# Patient Record
Sex: MALE | Race: WHITE | NOT HISPANIC OR LATINO | Employment: FULL TIME | URBAN - METROPOLITAN AREA
[De-identification: names, ages, dates, MRNs, and addresses within clinical notes are randomized per-mention and may not be internally consistent; named-entity substitution may affect disease eponyms.]

---

## 2017-01-18 RX ORDER — TRAMADOL HYDROCHLORIDE 50 MG/1
TABLET ORAL
Qty: 60 TABLET | Refills: 5 | Status: SHIPPED | OUTPATIENT
Start: 2017-01-18 | End: 2017-05-16 | Stop reason: SDUPTHER

## 2017-02-07 ENCOUNTER — LAB VISIT (OUTPATIENT)
Dept: LAB | Facility: HOSPITAL | Age: 67
End: 2017-02-07
Attending: INTERNAL MEDICINE
Payer: MEDICARE

## 2017-02-07 DIAGNOSIS — E11.9 TYPE II OR UNSPECIFIED TYPE DIABETES MELLITUS WITHOUT MENTION OF COMPLICATION, NOT STATED AS UNCONTROLLED: ICD-10-CM

## 2017-02-07 LAB
ALBUMIN SERPL BCP-MCNC: 4 G/DL
ALP SERPL-CCNC: 98 U/L
ALT SERPL W/O P-5'-P-CCNC: 32 U/L
ANION GAP SERPL CALC-SCNC: 9 MMOL/L
AST SERPL-CCNC: 30 U/L
BILIRUB SERPL-MCNC: 0.8 MG/DL
BUN SERPL-MCNC: 21 MG/DL
CALCIUM SERPL-MCNC: 9.6 MG/DL
CHLORIDE SERPL-SCNC: 105 MMOL/L
CO2 SERPL-SCNC: 25 MMOL/L
CREAT SERPL-MCNC: 1.1 MG/DL
EST. GFR  (AFRICAN AMERICAN): >60 ML/MIN/1.73 M^2
EST. GFR  (NON AFRICAN AMERICAN): >60 ML/MIN/1.73 M^2
GLUCOSE SERPL-MCNC: 105 MG/DL
POTASSIUM SERPL-SCNC: 4.4 MMOL/L
PROT SERPL-MCNC: 7.4 G/DL
SODIUM SERPL-SCNC: 139 MMOL/L

## 2017-02-07 PROCEDURE — 36415 COLL VENOUS BLD VENIPUNCTURE: CPT

## 2017-02-07 PROCEDURE — 83036 HEMOGLOBIN GLYCOSYLATED A1C: CPT

## 2017-02-07 PROCEDURE — 80053 COMPREHEN METABOLIC PANEL: CPT

## 2017-02-08 LAB
ESTIMATED AVG GLUCOSE: 154 MG/DL
HBA1C MFR BLD HPLC: 7 %

## 2017-02-13 ENCOUNTER — OFFICE VISIT (OUTPATIENT)
Dept: INTERNAL MEDICINE | Facility: CLINIC | Age: 67
End: 2017-02-13
Payer: MEDICARE

## 2017-02-13 VITALS
DIASTOLIC BLOOD PRESSURE: 86 MMHG | BODY MASS INDEX: 30.16 KG/M2 | WEIGHT: 222.69 LBS | SYSTOLIC BLOOD PRESSURE: 156 MMHG | HEIGHT: 72 IN | HEART RATE: 72 BPM

## 2017-02-13 DIAGNOSIS — E11.9 TYPE II OR UNSPECIFIED TYPE DIABETES MELLITUS WITHOUT MENTION OF COMPLICATION, NOT STATED AS UNCONTROLLED: ICD-10-CM

## 2017-02-13 DIAGNOSIS — M10.9 GOUT, UNSPECIFIED CAUSE, UNSPECIFIED CHRONICITY, UNSPECIFIED SITE: ICD-10-CM

## 2017-02-13 DIAGNOSIS — E11.9 TYPE 2 DIABETES MELLITUS WITHOUT COMPLICATION, WITHOUT LONG-TERM CURRENT USE OF INSULIN: Primary | ICD-10-CM

## 2017-02-13 DIAGNOSIS — G62.9 PERIPHERAL POLYNEUROPATHY: ICD-10-CM

## 2017-02-13 DIAGNOSIS — F32.A DEPRESSION, UNSPECIFIED DEPRESSION TYPE: ICD-10-CM

## 2017-02-13 DIAGNOSIS — E78.5 HYPERLIPIDEMIA, UNSPECIFIED HYPERLIPIDEMIA TYPE: ICD-10-CM

## 2017-02-13 DIAGNOSIS — F41.9 ANXIETY: ICD-10-CM

## 2017-02-13 DIAGNOSIS — K21.9 GASTROESOPHAGEAL REFLUX DISEASE, ESOPHAGITIS PRESENCE NOT SPECIFIED: ICD-10-CM

## 2017-02-13 PROCEDURE — 99214 OFFICE O/P EST MOD 30 MIN: CPT | Mod: S$PBB,,, | Performed by: INTERNAL MEDICINE

## 2017-02-13 PROCEDURE — 99999 PR PBB SHADOW E&M-EST. PATIENT-LVL III: CPT | Mod: PBBFAC,,, | Performed by: INTERNAL MEDICINE

## 2017-02-13 PROCEDURE — 99213 OFFICE O/P EST LOW 20 MIN: CPT | Mod: PBBFAC | Performed by: INTERNAL MEDICINE

## 2017-02-13 RX ORDER — AMITRIPTYLINE HYDROCHLORIDE 25 MG/1
25 TABLET, FILM COATED ORAL NIGHTLY PRN
Qty: 90 TABLET | Refills: 1 | Status: SHIPPED | OUTPATIENT
Start: 2017-02-13 | End: 2017-10-27

## 2017-02-13 NOTE — MR AVS SNAPSHOT
[facility]  [address]  [facility] [address]  Phone: [phone]  Fax: [phone]                  Ellis Hatch   2017 9:40 AM   Office Visit    Description:  Male : 1950   Provider:  Eddie Delaney Jr., MD   Department:  [facility] - Internal Medicine           Reason for Visit     Follow-up           Diagnoses this Visit        Comments    Type 2 diabetes mellitus without complication, without long-term current use of insulin    -  Primary     Gout, unspecified cause, unspecified chronicity, unspecified site         Hyperlipidemia, unspecified hyperlipidemia type         Gastroesophageal reflux disease, esophagitis presence not specified         Anxiety         Depression, unspecified depression type         Type II or unspecified type diabetes mellitus without mention of complication, not stated as uncontrolled         Peripheral polyneuropathy                To Do List           Goals (5 Years of Data)     None       These Medications        Disp Refills Start End    amitriptyline (ELAVIL) 25 MG tablet 90 tablet 1 2017     Take 1 tablet (25 mg total) by mouth nightly as needed for Insomnia. - Oral    Pharmacy: [facility] [address] #: [phone]         [facility] On Call     [facility] On Call Nurse Care Line -  Assistance  Registered nurses in the [facility] provide clinical advisement, health education, appointment booking, and other advisory services.  Call for this free service at [phone].             Medications           Message regarding Medications     Verify the changes and/or additions to your medication regime listed below are the same as discussed with your clinician today.  If any of these changes or additions are incorrect, please notify your healthcare provider.        START taking these NEW medications        Refills    amitriptyline (ELAVIL) 25 MG tablet 1    Sig: Take 1 tablet  (25 mg total) by mouth nightly as needed for Insomnia.    Class: Normal    Route: Oral           Verify that the below list of medications is an accurate representation of the medications you are currently taking.  If none reported, the list may be blank. If incorrect, please contact your healthcare provider. Carry this list with you in case of emergency.           Current Medications     allopurinol (ZYLOPRIM) 100 MG tablet Take 1 tablet (100 mg total) by mouth once daily.    alprazolam (XANAX) 0.5 MG tablet TAKE 1/2 TABLET BY MOUTH AS NEEDED NIGHTLY AT BEDTIME    amitriptyline (ELAVIL) 25 MG tablet Take 1 tablet (25 mg total) by mouth nightly as needed for Insomnia.    atorvastatin (LIPITOR) 40 MG tablet Take 1 tablet (40 mg total) by mouth once daily.    blood sugar diagnostic Strp Use to check glucose daily    blood-glucose meter kit Use as instructed    duloxetine (CYMBALTA) 60 MG capsule Take 1 capsule (60 mg total) by mouth once daily.    fish oil-omega-3 fatty acids 300-1,000 mg capsule Take 2 g by mouth once daily.    FOLIC ACID/MULTIVITS-MIN/LUT (CENTRUM SILVER ORAL) Take 1 tablet by mouth once daily.    glimepiride (AMARYL) 4 MG tablet TAKE 1 TABLET TWICE DAILY    glucosamine-condroitin-hrb#182 (COSAMIN ASU) 375-200-100 mg Cap Take 4 tablets by mouth once daily.    lancets Misc Use to check glucose daily    magnesium citrate Powd 325 mg by Misc.(Non-Drug; Combo Route) route every other day.    metformin (GLUCOPHAGE) 500 MG tablet Take 2 tablets (1,000 mg total) by mouth 2 (two) times daily with meals.    pantoprazole (PROTONIX) 40 MG tablet Take 1 tablet (40 mg total) by mouth once daily.    sitagliptin (JANUVIA) 100 MG Tab Take 1 tablet (100 mg total) by mouth once daily.    tadalafil (CIALIS) 10 MG tablet Take 1 tablet (10 mg total) by mouth daily as needed for Erectile Dysfunction.    tamsulosin (FLOMAX) 0.4 mg Cp24 TAKE 1 CAPSULE ONCE DAILY.    tramadol (ULTRAM) 50 mg tablet TAKE 1 TABLET EVERY 6  HOURS AS NEEDED FOR PAIN           Clinical Reference Information           Your Vitals Were     BP Pulse Height Weight BMI    156/86 (BP Location: Right arm, Patient Position: Sitting, BP Method: Automatic) 72 6' (1.829 m) 101 kg (222 lb 10.6 oz) 30.2 kg/m2      Blood Pressure          Most Recent Value    BP  (!)  156/86      Allergies as of 2/13/2017     Aspirin    Nsaids (Non-steroidal Anti-inflammatory Drug)    Phenergan [Promethazine]    Doxycycline      Immunizations Administered on Date of Encounter - 2/13/2017     None      Orders Placed During Today's Visit     Future Labs/Procedures Expected by Expires    Comprehensive metabolic panel  2/13/2017 4/14/2018    Hemoglobin A1c  2/13/2017 4/14/2018    Lipid panel  2/13/2017 4/14/2018      [facility] Sign-Up     Activating your [facility] account is as easy as 1-2-3!     1) Visit [email or web address], select Sign Up Now, enter this activation code and your date of birth, then select Next.  [email or web address]  Expires: 3/30/2017 10:55 AM      2) Create a username and password to use when you visit [facility] in the future and select a security question in case you lose your password and select Next.    3) Enter your e-mail address and click Sign Up!    Additional Information  If you have questions, please e-mail [email or web address] or call [phone] to talk to our [facility] staff. Remember, [facility] is NOT to be used for urgent needs. For medical emergencies, dial 911.         Language Assistance Services     ATTENTION: Language assistance services are available, free of charge. Please call [phone].      ATENCIÓN: Si habla español, tiene a youngblood disposición servicios gratuitos de asistencia lingüística. Llame al [phone].     CHÚ Ý: N?u b?n nói Ti?ng Vi?t, có các d?ch v? h? tr? ngôn ng? mi?n phí dành cho b?n. G?i s? [phone].         [facility] complies with applicable Federal civil rights laws and does not discriminate on the basis  of race, color, national origin, age, disability, or sex.

## 2017-02-13 NOTE — PROGRESS NOTES
HISTORY OF PRESENT ILLNESS: Mr. Hatch is a 66-year-old gentleman coming in    today for followup of multiple medical problems today.          1. This is a 65-year-old gentleman with diabetes mellitus type 2. He has had DM for 20 years.  He is on    Amaryl 4 mg in the morning, metformin, and Januvia. . His hemoglobin A1c 7.0  and recent glucose is 1105 . He has been moving around more. He is able to work, but not exercising very much. No low Glucoses at home. He is having some nocturnal burning in feet.            2. He has chronic kidney disease, status post acute kidney injury back in    December 2013. Creatinine had gone up to 2.0 to 2.3. He had renal stones and    had lithotripsy last year. Most recent creatinine is 1.1, putting him at    stage II chronic kidney disease. He is finialy off NSAIDS.           3. He has hyperlipidemia, which he has had for several years. He is on    Lipitor. His recent cholesterol is 99, HDL 42, LDL is 37.7, and triglycerides    are 98.       4. He has OA of both knees, s/p right  knee replacement. Left knee still bothers him.  Gong to try to see Ortho in [place].    5. Torn rotator cups- He is going to see MD in [facility] for his shoulder surgery.      6. ENZO- using CPAP every night and is doing well.               ROS : Gen - no fatigue or significant weight change  Eyes - no eye pain or visual changes  ENT - no hoarseness or sore throat  CV - No chest pain or SOB. NO palpitations.  Pulm - no cough or wheezing  GI - no N/V/D   no dysuria or incontinence  MS - no joint pain or muscle pain  Skin - no rash, or c/o of skin lesions  Neuro - no HA, dizziness--- memory is doing well.   Heme - no abnormal bleeding or bruising  Endo - no polydipsia, or temperature changes  Psych - no anxiety or depression          PHYSICAL EXAMINATION:  GENERAL: He is a well-appearing 66-year-old gentleman in no acute distress.  HEENT: Ear canals are open. TMs are clear. Oropharynx is clear.  NECK:  Supple. He has no JVD. Thyroid is not enlarged.  CARDIOVASCULAR: S1 and S2, regular rate and rhythm without murmur, gallop, or    Rub.  LUNGS: Clear bilaterally  ABDOMEN: Soft, nontender, no hepatosplenomegaly, no guarding or rebound    tenderness.  LOWER EXTREMITIES: No edema. Missing the middle finger on his left hand.    Feet- Protective Sensation (w/ 10 gram monofilament):  Right: Decreased  Left: Decreased    Visual Inspection:  Normal -  Bilateral    Pedal Pulses:   Right: Present  Left: Present    Posterior tibialis:   Right:Present  Left: Present          ASSESSMENT:  1. Diabetes mellitus type 2: Januvia, Amaryl 4mg bid, metformin 1000mg Bid. We discussed appropriate diet and exercise. Discussed hypoglycemia. We discussed invokania. He is drinking a lot of sweet tea and not exercising. He wishes to try lifestyle modification first.       2. Anxiety-- He is cutting  down to using 1/2 xanax every night.    stay on cymbalta. .  3. He has obstructive sleep apnea for which he uses CPAP. He was using it every night- Will set up CPAP supplies.    4. ED-- he has tried Viagra- cialias.    5. Back pain- seeing Chiropractor for this.   6. Peripheral neuropathy.  - will start some elavil     Will follow up in 3 months with labs.    Sees outside urologist in [place] and has family history of prostate cancer.

## 2017-05-18 RX ORDER — TRAMADOL HYDROCHLORIDE 50 MG/1
TABLET ORAL
Qty: 60 TABLET | Refills: 5 | Status: SHIPPED | OUTPATIENT
Start: 2017-05-18 | End: 2017-09-20 | Stop reason: SDUPTHER

## 2017-05-22 RX ORDER — ALPRAZOLAM 0.5 MG/1
TABLET ORAL
Qty: 15 TABLET | Refills: 3 | Status: SHIPPED | OUTPATIENT
Start: 2017-05-22 | End: 2017-09-11 | Stop reason: SDUPTHER

## 2017-08-04 DIAGNOSIS — E11.9 CONTROLLED TYPE 2 DIABETES MELLITUS WITHOUT COMPLICATION, UNSPECIFIED LONG TERM INSULIN USE STATUS: Primary | ICD-10-CM

## 2017-08-04 NOTE — PROGRESS NOTES
Per Health Maintenance/ pre-visit chart review, pt due for dm eye exam. Order placed per written order guideline.

## 2017-08-08 ENCOUNTER — LAB VISIT (OUTPATIENT)
Dept: LAB | Facility: HOSPITAL | Age: 67
End: 2017-08-08
Attending: INTERNAL MEDICINE
Payer: MEDICARE

## 2017-08-08 DIAGNOSIS — E78.5 HYPERLIPIDEMIA, UNSPECIFIED HYPERLIPIDEMIA TYPE: ICD-10-CM

## 2017-08-08 DIAGNOSIS — E11.9 TYPE 2 DIABETES MELLITUS WITHOUT COMPLICATION, WITHOUT LONG-TERM CURRENT USE OF INSULIN: ICD-10-CM

## 2017-08-08 LAB
ALBUMIN SERPL BCP-MCNC: 3.6 G/DL
ALP SERPL-CCNC: 107 U/L
ALT SERPL W/O P-5'-P-CCNC: 32 U/L
ANION GAP SERPL CALC-SCNC: 9 MMOL/L
AST SERPL-CCNC: 27 U/L
BILIRUB SERPL-MCNC: 0.7 MG/DL
BUN SERPL-MCNC: 21 MG/DL
CALCIUM SERPL-MCNC: 9.7 MG/DL
CHLORIDE SERPL-SCNC: 107 MMOL/L
CHOLEST/HDLC SERPL: 2.7 {RATIO}
CO2 SERPL-SCNC: 24 MMOL/L
CREAT SERPL-MCNC: 1 MG/DL
EST. GFR  (AFRICAN AMERICAN): >60 ML/MIN/1.73 M^2
EST. GFR  (NON AFRICAN AMERICAN): >60 ML/MIN/1.73 M^2
GLUCOSE SERPL-MCNC: 119 MG/DL
HDL/CHOLESTEROL RATIO: 37.1 %
HDLC SERPL-MCNC: 105 MG/DL
HDLC SERPL-MCNC: 39 MG/DL
LDLC SERPL CALC-MCNC: 45.6 MG/DL
NONHDLC SERPL-MCNC: 66 MG/DL
POTASSIUM SERPL-SCNC: 4.4 MMOL/L
PROT SERPL-MCNC: 7.5 G/DL
SODIUM SERPL-SCNC: 140 MMOL/L
TRIGL SERPL-MCNC: 102 MG/DL

## 2017-08-08 PROCEDURE — 80053 COMPREHEN METABOLIC PANEL: CPT

## 2017-08-08 PROCEDURE — 80061 LIPID PANEL: CPT

## 2017-08-08 PROCEDURE — 83036 HEMOGLOBIN GLYCOSYLATED A1C: CPT

## 2017-08-08 PROCEDURE — 36415 COLL VENOUS BLD VENIPUNCTURE: CPT

## 2017-08-09 LAB
ESTIMATED AVG GLUCOSE: 157 MG/DL
HBA1C MFR BLD HPLC: 7.1 %

## 2017-08-15 ENCOUNTER — OFFICE VISIT (OUTPATIENT)
Dept: INTERNAL MEDICINE | Facility: CLINIC | Age: 67
End: 2017-08-15
Payer: MEDICARE

## 2017-08-15 VITALS
HEART RATE: 73 BPM | DIASTOLIC BLOOD PRESSURE: 78 MMHG | SYSTOLIC BLOOD PRESSURE: 146 MMHG | WEIGHT: 220 LBS | HEIGHT: 72 IN | BODY MASS INDEX: 29.8 KG/M2 | OXYGEN SATURATION: 98 %

## 2017-08-15 DIAGNOSIS — F32.A DEPRESSION, UNSPECIFIED DEPRESSION TYPE: ICD-10-CM

## 2017-08-15 DIAGNOSIS — N40.0 BENIGN PROSTATIC HYPERPLASIA, PRESENCE OF LOWER URINARY TRACT SYMPTOMS UNSPECIFIED: ICD-10-CM

## 2017-08-15 DIAGNOSIS — E11.9 TYPE 2 DIABETES MELLITUS WITHOUT COMPLICATION, WITHOUT LONG-TERM CURRENT USE OF INSULIN: ICD-10-CM

## 2017-08-15 DIAGNOSIS — E78.5 HYPERLIPIDEMIA, UNSPECIFIED HYPERLIPIDEMIA TYPE: Primary | ICD-10-CM

## 2017-08-15 DIAGNOSIS — F41.9 ANXIETY: ICD-10-CM

## 2017-08-15 LAB
CREAT UR-MCNC: 137 MG/DL
MICROALBUMIN UR DL<=1MG/L-MCNC: 706 UG/ML
MICROALBUMIN/CREATININE RATIO: 515.3 UG/MG

## 2017-08-15 PROCEDURE — 99999 PR PBB SHADOW E&M-EST. PATIENT-LVL IV: CPT | Mod: PBBFAC,,, | Performed by: INTERNAL MEDICINE

## 2017-08-15 PROCEDURE — 3078F DIAST BP <80 MM HG: CPT | Mod: ,,, | Performed by: INTERNAL MEDICINE

## 2017-08-15 PROCEDURE — 3045F PR MOST RECENT HEMOGLOBIN A1C LEVEL 7.0-9.0%: CPT | Mod: ,,, | Performed by: INTERNAL MEDICINE

## 2017-08-15 PROCEDURE — 99214 OFFICE O/P EST MOD 30 MIN: CPT | Mod: PBBFAC | Performed by: INTERNAL MEDICINE

## 2017-08-15 PROCEDURE — 82570 ASSAY OF URINE CREATININE: CPT

## 2017-08-15 PROCEDURE — 99214 OFFICE O/P EST MOD 30 MIN: CPT | Mod: S$PBB,,, | Performed by: INTERNAL MEDICINE

## 2017-08-15 PROCEDURE — 3077F SYST BP >= 140 MM HG: CPT | Mod: ,,, | Performed by: INTERNAL MEDICINE

## 2017-08-15 PROCEDURE — 3008F BODY MASS INDEX DOCD: CPT | Mod: ,,, | Performed by: INTERNAL MEDICINE

## 2017-08-15 NOTE — PROGRESS NOTES
HISTORY OF PRESENT ILLNESS: Mr. Hatch is a 67-year-old gentleman coming in    today for followup of multiple medical problems today.          1. This is a 65-year-old gentleman with diabetes mellitus type 2. He has had DM for 20 years.  He is on    Amaryl 4 mg in the morning, metformin, and Januvia. . His hemoglobin A1c 7.1  and recent glucose is 119 . He has been moving around more. He is able to work, but not exercising very much. No low Glucoses at home. He is having some nocturnal burning in feet.            2. He has chronic kidney disease, status post acute kidney injury back in    December 2013. Creatinine had gone up to 2.0 to 2.3. He had renal stones and    had lithotripsy last year. Most recent creatinine is 1.0, putting him at    stage II chronic kidney disease. He is finialy off NSAIDS.           3. He has hyperlipidemia, which he has had for several years. He is on    Lipitor. His recent cholesterol is 105, HDL 39, LDL is 45.6, and triglycerides    are 98.       4. He has OA of both knees, s/p right  knee replacement. Left knee still bothers him. He is seeing ortho later today to look at the other knee.   Gong to try to see Ortho in [place].    5. Torn rotator cups- He is going to see MD in [facility] for his shoulder surgery.      6. ENZO- using CPAP every night and is doing well.               ROS : Gen - no fatigue or significant weight change  Eyes - no eye pain or visual changes  ENT - no hoarseness or sore throat  CV - No chest pain or SOB. NO palpitations.  Pulm - no cough or wheezing  GI - no N/V/D   no dysuria or incontinence  MS - no joint pain or muscle pain  Skin - no rash, or c/o of skin lesions  Neuro - no HA, dizziness--- memory is doing well.   Heme - no abnormal bleeding or bruising  Endo - no polydipsia, or temperature changes  Psych - no anxiety or depression          PHYSICAL EXAMINATION:  GENERAL: He is a well-appearing 67-year-old gentleman in no acute distress.  HEENT: Ear  canals are open. TMs are clear. Oropharynx is clear.  NECK: Supple. He has no JVD. Thyroid is not enlarged.  CARDIOVASCULAR: S1 and S2, regular rate and rhythm without murmur, gallop, or    Rub.  LUNGS: Clear bilaterally  ABDOMEN: Soft, nontender, no hepatosplenomegaly, no guarding or rebound    tenderness.  LOWER EXTREMITIES: No edema. Missing the middle finger on his left hand.              ASSESSMENT:  1. Diabetes mellitus type 2: Januvia, Amaryl 4mg bid, metformin 1000mg Bid. We discussed appropriate diet and exercise. Discussed hypoglycemia. We discussed invokania. He is drinking a lot of sweet tea and not exercising. He wishes to try lifestyle modification first.       2. Anxiety-- He is cutting  down to using 1/2 xanax every night.    stay on cymbalta. .  3. He has obstructive sleep apnea for which he uses CPAP. He was using it every night- he wake up well refreshed.    4. ED-- he has tried Viagra- cialias.    5. Back pain- seeing Chiropractor for this.   6. Peripheral neuropathy.  - doing better on  elavil   - but makes him tired-- feet doing ok.    Will follow up in 6months with labs.    Sees outside urologist in [place] and has family history of prostate cancer. (next month)

## 2017-08-28 RX ORDER — ALLOPURINOL 100 MG/1
TABLET ORAL
Qty: 30 TABLET | Refills: 12 | Status: SHIPPED | OUTPATIENT
Start: 2017-08-28 | End: 2018-06-27 | Stop reason: SDUPTHER

## 2017-09-06 RX ORDER — TAMSULOSIN HYDROCHLORIDE 0.4 MG/1
CAPSULE ORAL
Qty: 30 CAPSULE | Refills: 12 | Status: SHIPPED | OUTPATIENT
Start: 2017-09-06 | End: 2018-06-27 | Stop reason: SDUPTHER

## 2017-09-11 RX ORDER — METFORMIN HYDROCHLORIDE 500 MG/1
TABLET ORAL
Qty: 360 TABLET | Refills: 3 | Status: SHIPPED | OUTPATIENT
Start: 2017-09-11 | End: 2018-06-27 | Stop reason: SDUPTHER

## 2017-09-11 RX ORDER — ALPRAZOLAM 0.5 MG/1
TABLET ORAL
Qty: 15 TABLET | Refills: 3 | Status: SHIPPED | OUTPATIENT
Start: 2017-09-11 | End: 2017-12-06 | Stop reason: SDUPTHER

## 2017-09-16 RX ORDER — PANTOPRAZOLE SODIUM 40 MG/1
TABLET, DELAYED RELEASE ORAL
Qty: 90 TABLET | Refills: 3 | Status: SHIPPED | OUTPATIENT
Start: 2017-09-16 | End: 2018-06-27 | Stop reason: SDUPTHER

## 2017-09-19 RX ORDER — SITAGLIPTIN 100 MG/1
TABLET, FILM COATED ORAL
Qty: 90 TABLET | Refills: 3 | Status: SHIPPED | OUTPATIENT
Start: 2017-09-19 | End: 2018-04-12 | Stop reason: SDUPTHER

## 2017-09-19 RX ORDER — DULOXETIN HYDROCHLORIDE 60 MG/1
CAPSULE, DELAYED RELEASE ORAL
Qty: 30 CAPSULE | Refills: 11 | Status: SHIPPED | OUTPATIENT
Start: 2017-09-19 | End: 2018-06-27 | Stop reason: SDUPTHER

## 2017-09-21 RX ORDER — TRAMADOL HYDROCHLORIDE 50 MG/1
TABLET ORAL
Qty: 60 TABLET | Refills: 5 | Status: SHIPPED | OUTPATIENT
Start: 2017-09-21 | End: 2018-03-19 | Stop reason: SDUPTHER

## 2017-10-06 RX ORDER — GLIMEPIRIDE 4 MG/1
TABLET ORAL
Qty: 60 TABLET | Refills: 9 | Status: SHIPPED | OUTPATIENT
Start: 2017-10-06 | End: 2018-04-12 | Stop reason: SDUPTHER

## 2017-10-23 RX ORDER — ATORVASTATIN CALCIUM 40 MG/1
TABLET, FILM COATED ORAL
Qty: 30 TABLET | Refills: 11 | Status: SHIPPED | OUTPATIENT
Start: 2017-10-23 | End: 2018-06-27 | Stop reason: SDUPTHER

## 2017-10-27 ENCOUNTER — TELEPHONE (OUTPATIENT)
Dept: INTERNAL MEDICINE | Facility: CLINIC | Age: 67
End: 2017-10-27

## 2017-10-27 NOTE — TELEPHONE ENCOUNTER
Please call -- he can not take his amitriptyline anymore because of potential interactions with his tramadol and alprazolam--please ask him to stop the amitriptyline

## 2017-10-27 NOTE — TELEPHONE ENCOUNTER
Spoke to pt wife and informed her to have pt stop taking amitriptyline because of the potential interactions with his tramadol and alprazolam

## 2017-11-27 ENCOUNTER — TELEPHONE (OUTPATIENT)
Dept: INTERNAL MEDICINE | Facility: CLINIC | Age: 67
End: 2017-11-27

## 2017-11-27 NOTE — TELEPHONE ENCOUNTER
----- Message from Danni Zapien sent at 2017 10:42 AM CST -----  Contact: pt [phone]  Pt would like a call from the nurse in regards to getting an appointment before 2018 because the pt insurance will  the next available is 2018,please advise pt

## 2017-12-07 RX ORDER — ALPRAZOLAM 0.5 MG/1
TABLET ORAL
Qty: 15 TABLET | Refills: 3 | Status: SHIPPED | OUTPATIENT
Start: 2017-12-07 | End: 2018-05-16 | Stop reason: SDUPTHER

## 2017-12-12 ENCOUNTER — LAB VISIT (OUTPATIENT)
Dept: LAB | Facility: HOSPITAL | Age: 67
End: 2017-12-12
Attending: INTERNAL MEDICINE
Payer: COMMERCIAL

## 2017-12-12 DIAGNOSIS — E78.5 HYPERLIPIDEMIA, UNSPECIFIED HYPERLIPIDEMIA TYPE: ICD-10-CM

## 2017-12-12 DIAGNOSIS — E11.9 TYPE 2 DIABETES MELLITUS WITHOUT COMPLICATION, WITHOUT LONG-TERM CURRENT USE OF INSULIN: ICD-10-CM

## 2017-12-12 LAB
ALBUMIN SERPL BCP-MCNC: 3.8 G/DL
ALP SERPL-CCNC: 114 U/L
ALT SERPL W/O P-5'-P-CCNC: 21 U/L
ANION GAP SERPL CALC-SCNC: 11 MMOL/L
AST SERPL-CCNC: 23 U/L
BILIRUB SERPL-MCNC: 0.7 MG/DL
BUN SERPL-MCNC: 19 MG/DL
CALCIUM SERPL-MCNC: 10.1 MG/DL
CHLORIDE SERPL-SCNC: 104 MMOL/L
CHOLEST SERPL-MCNC: 117 MG/DL
CHOLEST/HDLC SERPL: 2.9 {RATIO}
CO2 SERPL-SCNC: 25 MMOL/L
CREAT SERPL-MCNC: 1 MG/DL
EST. GFR  (AFRICAN AMERICAN): >60 ML/MIN/1.73 M^2
EST. GFR  (NON AFRICAN AMERICAN): >60 ML/MIN/1.73 M^2
ESTIMATED AVG GLUCOSE: 148 MG/DL
GLUCOSE SERPL-MCNC: 105 MG/DL
HBA1C MFR BLD HPLC: 6.8 %
HDLC SERPL-MCNC: 40 MG/DL
HDLC SERPL: 34.2 %
LDLC SERPL CALC-MCNC: 50.6 MG/DL
NONHDLC SERPL-MCNC: 77 MG/DL
POTASSIUM SERPL-SCNC: 4.2 MMOL/L
PROT SERPL-MCNC: 8 G/DL
SODIUM SERPL-SCNC: 140 MMOL/L
TRIGL SERPL-MCNC: 132 MG/DL

## 2017-12-12 PROCEDURE — 36415 COLL VENOUS BLD VENIPUNCTURE: CPT

## 2017-12-12 PROCEDURE — 80053 COMPREHEN METABOLIC PANEL: CPT

## 2017-12-12 PROCEDURE — 83036 HEMOGLOBIN GLYCOSYLATED A1C: CPT

## 2017-12-12 PROCEDURE — 80061 LIPID PANEL: CPT

## 2017-12-20 ENCOUNTER — OFFICE VISIT (OUTPATIENT)
Dept: INTERNAL MEDICINE | Facility: CLINIC | Age: 67
End: 2017-12-20
Payer: MEDICARE

## 2017-12-20 VITALS
WEIGHT: 214.31 LBS | BODY MASS INDEX: 29.03 KG/M2 | HEART RATE: 96 BPM | DIASTOLIC BLOOD PRESSURE: 70 MMHG | HEIGHT: 72 IN | SYSTOLIC BLOOD PRESSURE: 130 MMHG

## 2017-12-20 DIAGNOSIS — F41.9 ANXIETY: Primary | ICD-10-CM

## 2017-12-20 DIAGNOSIS — Z96.653 STATUS POST TOTAL BILATERAL KNEE REPLACEMENT: ICD-10-CM

## 2017-12-20 DIAGNOSIS — E78.5 HYPERLIPIDEMIA, UNSPECIFIED HYPERLIPIDEMIA TYPE: ICD-10-CM

## 2017-12-20 DIAGNOSIS — E11.9 TYPE 2 DIABETES MELLITUS WITHOUT COMPLICATION, WITHOUT LONG-TERM CURRENT USE OF INSULIN: ICD-10-CM

## 2017-12-20 DIAGNOSIS — F11.90 OPIOID USE, UNSPECIFIED, UNCOMPLICATED: ICD-10-CM

## 2017-12-20 DIAGNOSIS — G62.9 PERIPHERAL POLYNEUROPATHY: ICD-10-CM

## 2017-12-20 DIAGNOSIS — M25.561 RIGHT KNEE PAIN, UNSPECIFIED CHRONICITY: ICD-10-CM

## 2017-12-20 PROCEDURE — 80307 DRUG TEST PRSMV CHEM ANLYZR: CPT

## 2017-12-20 PROCEDURE — 99999 PR PBB SHADOW E&M-EST. PATIENT-LVL III: CPT | Mod: PBBFAC,,, | Performed by: INTERNAL MEDICINE

## 2017-12-20 PROCEDURE — 99213 OFFICE O/P EST LOW 20 MIN: CPT | Mod: PBBFAC | Performed by: INTERNAL MEDICINE

## 2017-12-20 PROCEDURE — 99214 OFFICE O/P EST MOD 30 MIN: CPT | Mod: S$PBB,,, | Performed by: INTERNAL MEDICINE

## 2017-12-20 RX ORDER — GABAPENTIN 300 MG/1
300 CAPSULE ORAL NIGHTLY
Qty: 90 CAPSULE | Refills: 11 | Status: SHIPPED | OUTPATIENT
Start: 2017-12-20 | End: 2018-06-27 | Stop reason: SDUPTHER

## 2017-12-20 NOTE — PROGRESS NOTES
Progress Notes        HISTORY OF PRESENT ILLNESS: Mr. Hatch is a 67-year-old gentleman coming in    today for followup of multiple medical problems today.          1. This is a 67-year-old gentleman with diabetes mellitus type 2. He has had DM for 20 years.  He is on   Amaryl 4 mg in the morning, metformin, and Januvia. . His hemoglobin A1c 6.8  and recent glucose is 1105 . He has been moving around more. He is able to work, but not exercising very much. No low Glucoses at home. He is having some nocturnal burning in feet.            2. He has chronic kidney disease, status post acute kidney injury back in    December 2013. Creatinine had gone up to 2.0 to 2.3. He had renal stones and    had lithotripsy last year. Most recent creatinine is 1.0, putting him at    stage II chronic kidney disease. He is finialy off NSAIDS.           3. He has hyperlipidemia, which he has had for several years. He is on    Lipitor. His recent cholesterol is 117, HDL 40, LDL is 50.6 , and triglycerides    are 132.       4. He has OA of both knees, s/p right  knee replacement. Since last visit he had a left knee replacement.    Gong to try to see Ortho in [place].      5. Torn rotator cups- He is going to see MD in [facility] for his shoulder surgery.      6. ENZO- using CPAP every night and is doing well.               ROS : Gen - no fatigue or significant weight change  Eyes - no eye pain or visual changes  ENT - no hoarseness or sore throat  CV - No chest pain or SOB. NO palpitations.  Pulm - no cough or wheezing  GI - no N/V/D   no dysuria or incontinence  MS - no joint pain or muscle pain  Skin - no rash, or c/o of skin lesions  Neuro - no HA, dizziness--- memory is doing well.   Heme - no abnormal bleeding or bruising  Endo - no polydipsia, or temperature changes  Psych - no anxiety or depression          PHYSICAL EXAMINATION:  /70 (BP Location: Left arm, Patient Position: Sitting, BP Method: Medium (Manual))   Pulse 96    Ht 6' (1.829 m)   Wt 97.2 kg (214 lb 4.6 oz)   BMI 29.06 kg/m²     GENERAL: He is a well-appearing 67-year-old gentleman in no acute distress.  HEENT: Ear canals are open. TMs are clear. Oropharynx is clear.  NECK: Supple. He has no JVD. Thyroid is not enlarged.  CARDIOVASCULAR: S1 and S2, regular rate and rhythm without murmur, gallop, or    Rub.  LUNGS: Clear bilaterally  ABDOMEN: Soft, nontender, no hepatosplenomegaly, no guarding or rebound    tenderness.  LOWER EXTREMITIES: No edema. Missing the middle finger on his left hand.    Protective Sensation (w/ 10 gram monofilament):  Right: Decreased  Left: Decreased    Visual Inspection:  Normal -  Bilateral    Pedal Pulses:   Right: Present  Left: Present    Posterior tibialis:   Right:Present  Left: Present              ASSESSMENT:  1. Diabetes mellitus type 2: Januvia, Amaryl 4mg bid, metformin 1000mg Bid. We discussed appropriate diet and exercise. Discussed hypoglycemia.  He is drinking a lot of sweet tea and not exercising. He wishes to try lifestyle modification first.       2. Anxiety-- He is cutting  down to using 1/2 xanax every night.    stay on cymbalta. .  3. He has obstructive sleep apnea for which he uses CPAP. He was using it every night- he wake up well refreshed.    4. ED-- he has tried Viagra- cialias.    5. Back pain- seeing Chiropractor for this.   6. Peripheral neuropathy.  -Had to come off   elavil   due to contraindication with tramadol.  He is on this due to his oa of the knees and shoulder pains.  He had ARF in 2013 on NSAIDs.    Will follow up in 3 months with labs.        On tramadol and hydrocodone due to recent knee replacement-- opoid questionnaire done  and will do drug screen-- will follow up in 3 months

## 2017-12-25 LAB
6MAM UR QL: NOT DETECTED
7AMINOCLONAZEPAM UR QL: NOT DETECTED
A-OH ALPRAZ UR QL: PRESENT
ALPRAZ UR QL: NOT DETECTED
AMPHET UR QL SCN: NOT DETECTED
ANNOTATION COMMENT IMP: NORMAL
ANNOTATION COMMENT IMP: NORMAL
BARBITURATES UR QL: NOT DETECTED
BUPRENORPHINE UR QL: NOT DETECTED
BZE UR QL: NOT DETECTED
CARBOXYTHC UR QL: NOT DETECTED
CARISOPRODOL UR QL: NOT DETECTED
CLONAZEPAM UR QL: NOT DETECTED
CODEINE UR QL: NOT DETECTED
CREAT UR-MCNC: 196.5 MG/DL (ref 20–400)
DIAZEPAM UR QL: NOT DETECTED
ETHYL GLUCURONIDE UR QL: NOT DETECTED
FENTANYL UR QL: NOT DETECTED
HYDROCODONE UR QL: PRESENT
HYDROMORPHONE UR QL: NOT DETECTED
LORAZEPAM UR QL: NOT DETECTED
MDA UR QL: NOT DETECTED
MDEA UR QL: NOT DETECTED
MDMA UR QL: NOT DETECTED
ME-PHENIDATE UR QL: NOT DETECTED
MEPERIDINE UR QL: NOT DETECTED
METHADONE UR QL: NOT DETECTED
METHAMPHET UR QL: NOT DETECTED
MIDAZOLAM UR QL SCN: NOT DETECTED
MORPHINE UR QL: NOT DETECTED
NORBUPRENORPHINE UR QL CFM: NOT DETECTED
NORDIAZEPAM UR QL: NOT DETECTED
NORFENTANYL UR QL: NOT DETECTED
NORHYDROCODONE UR QL CFM: PRESENT
NOROXYCODONE UR QL CFM: NOT DETECTED
NOROXYMORPHONE: NOT DETECTED
OXAZEPAM UR QL: NOT DETECTED
OXYCODONE UR QL: NOT DETECTED
OXYMORPHONE UR QL: NOT DETECTED
PATHOLOGY STUDY: NORMAL
PCP UR QL: NOT DETECTED
PHENTERMINE UR QL: NOT DETECTED
PROPOXYPH UR QL: NOT DETECTED
SERVICE CMNT-IMP: NORMAL
TAPENTADOL UR QL SCN: NOT DETECTED
TAPENTADOL-O-SULF: NOT DETECTED
TEMAZEPAM UR QL: NOT DETECTED
TRAMADOL UR QL: PRESENT
ZOLPIDEM UR QL: NOT DETECTED

## 2018-02-28 ENCOUNTER — TELEPHONE (OUTPATIENT)
Dept: INTERNAL MEDICINE | Facility: CLINIC | Age: 68
End: 2018-02-28

## 2018-02-28 DIAGNOSIS — E11.9 TYPE 2 DIABETES MELLITUS WITHOUT COMPLICATION, WITHOUT LONG-TERM CURRENT USE OF INSULIN: Primary | ICD-10-CM

## 2018-02-28 NOTE — TELEPHONE ENCOUNTER
----- Message from Ana M Brown sent at 2/26/2018  4:29 PM CST -----  Regarding: Upcoming Lab work  Patient would like to come in March 20th to get his lab work done, but there are no lab orders in the system. Patients wife called to set up appointment. If patient does not need lab work done prior to appointment please call and let her know. If he does need the labs done please put in the orders so they are available when the patient presents. Thanks!

## 2018-02-28 NOTE — TELEPHONE ENCOUNTER
----- Message from Jenny Zimmer sent at 2/28/2018  2:24 PM CST -----  Contact: Patient [phone]  I have scheduled the labs and physical. Labs orders need to be placed an linked to the appt.     Date of Physical: 03/27/2018    Date of Lab: 03/20/2018    Please call and advise.    Thank you

## 2018-03-06 ENCOUNTER — TELEPHONE (OUTPATIENT)
Dept: INTERNAL MEDICINE | Facility: CLINIC | Age: 68
End: 2018-03-06

## 2018-03-06 NOTE — TELEPHONE ENCOUNTER
----- Message from Jenny Zimmer sent at 3/6/2018  2:27 PM CST -----  [phone]  I have scheduled the labs and physical. Labs orders need to be placed an linked to the appt.     Date of Physical: 03/27/2018    Date of Lab: 03/20/2018    Please contact Leonarda so she can contact the patient letting him know when the orders are in.     Please call and advise.    Thank you

## 2018-03-20 ENCOUNTER — LAB VISIT (OUTPATIENT)
Dept: LAB | Facility: HOSPITAL | Age: 68
End: 2018-03-20
Attending: INTERNAL MEDICINE
Payer: COMMERCIAL

## 2018-03-20 DIAGNOSIS — E11.9 TYPE 2 DIABETES MELLITUS WITHOUT COMPLICATION, WITHOUT LONG-TERM CURRENT USE OF INSULIN: ICD-10-CM

## 2018-03-20 LAB
ALBUMIN SERPL BCP-MCNC: 3.9 G/DL
ALP SERPL-CCNC: 126 U/L
ALT SERPL W/O P-5'-P-CCNC: 22 U/L
ANION GAP SERPL CALC-SCNC: 10 MMOL/L
AST SERPL-CCNC: 25 U/L
BILIRUB SERPL-MCNC: 1 MG/DL
BUN SERPL-MCNC: 19 MG/DL
CALCIUM SERPL-MCNC: 10.2 MG/DL
CHLORIDE SERPL-SCNC: 103 MMOL/L
CO2 SERPL-SCNC: 26 MMOL/L
CREAT SERPL-MCNC: 1 MG/DL
EST. GFR  (AFRICAN AMERICAN): >60 ML/MIN/1.73 M^2
EST. GFR  (NON AFRICAN AMERICAN): >60 ML/MIN/1.73 M^2
ESTIMATED AVG GLUCOSE: 157 MG/DL
GLUCOSE SERPL-MCNC: 108 MG/DL
HBA1C MFR BLD HPLC: 7.1 %
POTASSIUM SERPL-SCNC: 4.1 MMOL/L
PROT SERPL-MCNC: 8.1 G/DL
SODIUM SERPL-SCNC: 139 MMOL/L

## 2018-03-20 PROCEDURE — 83036 HEMOGLOBIN GLYCOSYLATED A1C: CPT

## 2018-03-20 PROCEDURE — 80053 COMPREHEN METABOLIC PANEL: CPT

## 2018-03-20 PROCEDURE — 36415 COLL VENOUS BLD VENIPUNCTURE: CPT

## 2018-03-21 RX ORDER — TRAMADOL HYDROCHLORIDE 50 MG/1
TABLET ORAL
Qty: 60 TABLET | Refills: 0 | Status: SHIPPED | OUTPATIENT
Start: 2018-03-21 | End: 2018-03-27 | Stop reason: SDUPTHER

## 2018-03-27 ENCOUNTER — OFFICE VISIT (OUTPATIENT)
Dept: INTERNAL MEDICINE | Facility: CLINIC | Age: 68
End: 2018-03-27
Payer: MEDICARE

## 2018-03-27 VITALS
SYSTOLIC BLOOD PRESSURE: 138 MMHG | HEIGHT: 72 IN | HEART RATE: 71 BPM | DIASTOLIC BLOOD PRESSURE: 70 MMHG | WEIGHT: 219.13 LBS | BODY MASS INDEX: 29.68 KG/M2

## 2018-03-27 DIAGNOSIS — F32.A DEPRESSION, UNSPECIFIED DEPRESSION TYPE: ICD-10-CM

## 2018-03-27 DIAGNOSIS — E78.5 HYPERLIPIDEMIA, UNSPECIFIED HYPERLIPIDEMIA TYPE: ICD-10-CM

## 2018-03-27 DIAGNOSIS — Z98.890 S/P KNEE SURGERY: ICD-10-CM

## 2018-03-27 DIAGNOSIS — E11.9 TYPE 2 DIABETES MELLITUS WITHOUT COMPLICATION, WITHOUT LONG-TERM CURRENT USE OF INSULIN: ICD-10-CM

## 2018-03-27 PROCEDURE — 99999 PR PBB SHADOW E&M-EST. PATIENT-LVL IV: CPT | Mod: PBBFAC,,, | Performed by: INTERNAL MEDICINE

## 2018-03-27 PROCEDURE — 99214 OFFICE O/P EST MOD 30 MIN: CPT | Mod: S$PBB,,, | Performed by: INTERNAL MEDICINE

## 2018-03-27 PROCEDURE — 99214 OFFICE O/P EST MOD 30 MIN: CPT | Mod: PBBFAC | Performed by: INTERNAL MEDICINE

## 2018-03-27 RX ORDER — TRAMADOL HYDROCHLORIDE 50 MG/1
50 TABLET ORAL EVERY 12 HOURS PRN
Qty: 60 TABLET | Refills: 0 | Status: SHIPPED | OUTPATIENT
Start: 2018-03-27 | End: 2018-04-27 | Stop reason: SDUPTHER

## 2018-03-27 NOTE — PROGRESS NOTES
HISTORY OF PRESENT ILLNESS: Mr. Hatch is a 68-year-old gentleman coming in    today for followup of multiple medical problems today.          1. This is a 67-year-old gentleman with diabetes mellitus type 2. He has had DM for 20 years.  He is on   Amaryl 4 mg in the morning, metformin, and Januvia. . His hemoglobin A1c 7.1  and recent glucose is 108 . He has been moving around more. He is able to work, but not exercising very much. No low Glucoses at home. He is having some nocturnal burning in feet.            2. He has chronic kidney disease, status post acute kidney injury back in    December 2013. Creatinine had gone up to 2.0 to 2.3. He had renal stones and    had lithotripsy last year. Most recent creatinine is 1.0, putting him at    stage II chronic kidney disease. He is finialy off NSAIDS.           3. He has hyperlipidemia, which he has had for several years. He is on    Lipitor. His recent cholesterol is 117, HDL 40, LDL is 50.6 , and triglycerides    are 132.       4. He has OA of both knees, s/p right  knee replacement. Since last visit he had a left knee replacement.    Gong to try to see Ortho in [place].       5. Torn rotator cups- He is going to see MD in [facility] for his shoulder surgery.      6. ENZO- using CPAP every night and is doing well. Feels well rested.                ROS : Gen - no fatigue or significant weight change  Eyes - no eye pain or visual changes  ENT - no hoarseness or sore throat  CV - No chest pain or SOB. NO palpitations.  Pulm - no cough or wheezing  GI - no N/V/D   no dysuria or incontinence  MS - no joint pain or muscle pain  Skin - no rash, or c/o of skin lesions  Neuro - no HA, dizziness--- memory is doing well.   Heme - no abnormal bleeding or bruising  Endo - no polydipsia, or temperature changes  Psych - no anxiety or depression          PHYSICAL EXAMINATION:  /70 (BP Location: Right arm, Patient Position: Sitting, BP Method: Large (Manual))   Pulse 71   Ht  6' (1.829 m)   Wt 99.4 kg (219 lb 2.2 oz)   BMI 29.72 kg/m²      GENERAL: He is a well-appearing 67-year-old gentleman in no acute distress.  HEENT: Ear canals are open. TMs are clear. Oropharynx is clear.  NECK: Supple. He has no JVD. Thyroid is not enlarged.  CARDIOVASCULAR: S1 and S2, regular rate and rhythm without murmur, gallop, or    Rub.  LUNGS: Clear bilaterally  ABDOMEN: Soft, nontender, no hepatosplenomegaly, no guarding or rebound    tenderness.  LOWER EXTREMITIES: No edema. Missing the middle finger on his left hand.    Protective Sensation (w/ 10 gram monofilament):  Right: Decreased  Left: Decreased     Visual Inspection:  Normal -  Bilateral     Pedal Pulses:   Right: Present  Left: Present     Posterior tibialis:   Right:Present  Left: Present               ASSESSMENT:  1. Diabetes mellitus type 2: Januvia, Amaryl 4mg bid, metformin 1000mg Bid. We discussed appropriate diet and exercise. Discussed hypoglycemia.  He is drinking a lot of sweet tea and not exercising. He wishes to try lifestyle modification first.       2. Anxiety-- He is cutting  down to using 1/2 xanax every night.    stay on cymbalta. .  3. He has obstructive sleep apnea for which he uses CPAP. He was using it every night- he wake up well refreshed.    4. ED-- he has tried Viagra- cialias.    5. Back pain- seeing Chiropractor for this. Taking tramadol twice daily-- off NSAID due to h/o DEE.   reviewed-- drug  reviewed.  Questionnaire done-- pain contract signes last visit.   6. Peripheral neuropathy.  -Had to come off   elavil   due to contraindication with tramadol.  He is on this due to his oa of the knees and shoulder pains.  He had ARF in 2013 on NSAIDs.    Will follow up in 3 months with labs.          On tramadol due to recent knee replacement-- o-- will follow up in 3 months

## 2018-04-12 RX ORDER — GLIMEPIRIDE 4 MG/1
TABLET ORAL
Qty: 60 TABLET | Refills: 9 | Status: SHIPPED | OUTPATIENT
Start: 2018-04-12 | End: 2018-06-27 | Stop reason: SDUPTHER

## 2018-04-27 ENCOUNTER — TELEPHONE (OUTPATIENT)
Dept: INTERNAL MEDICINE | Facility: CLINIC | Age: 68
End: 2018-04-27

## 2018-04-27 RX ORDER — TRAMADOL HYDROCHLORIDE 50 MG/1
TABLET ORAL
Qty: 60 TABLET | Refills: 0 | Status: CANCELLED | OUTPATIENT
Start: 2018-04-27

## 2018-04-27 NOTE — TELEPHONE ENCOUNTER
"----- Message from Maryana Hughes sent at 4/27/2018 11:24 AM CDT -----  Contact: Shruthi/wife/[phone]  RX request - refill or new RX.  Is this a refill or new RX:  refill  RX name and strength:traMADol (ULTRAM) 50 mg tablet   Directions: Take 1 tablet (50 mg total) by mouth every 12 (twelve) hours as needed  Is this a 30 day or 90 day RX:    Pharmacy name and phone # (DON'T enter "on file" or "in chart"): [facility] [phone] (Phone) [phone] (Fax)  Comments:        "

## 2018-04-27 NOTE — TELEPHONE ENCOUNTER
----- Message from Marilyn Larsen sent at 4/27/2018  4:30 PM CDT -----  Contact: [facility] [place] [phone]  Pharmacy is calling to check on the status of the refill   RX name:  traMADol (ULTRAM) 50 mg tablet    Thank you

## 2018-05-01 RX ORDER — TRAMADOL HYDROCHLORIDE 50 MG/1
50 TABLET ORAL EVERY 12 HOURS PRN
Qty: 60 TABLET | Refills: 0 | Status: SHIPPED | OUTPATIENT
Start: 2018-05-01 | End: 2018-06-27

## 2018-05-01 RX ORDER — TRAMADOL HYDROCHLORIDE 50 MG/1
TABLET ORAL
Qty: 60 TABLET | Refills: 0 | OUTPATIENT
Start: 2018-05-01

## 2018-05-14 RX ORDER — ALPRAZOLAM 0.5 MG/1
TABLET ORAL
Qty: 15 TABLET | Refills: 3 | Status: CANCELLED | OUTPATIENT
Start: 2018-05-14

## 2018-05-14 NOTE — TELEPHONE ENCOUNTER
"----- Message from Susan Kevin sent at 5/14/2018  2:25 PM CDT -----  Contact: wife/ [phone]/ Shruthi  RX request - refill or new RX.  Is this a refill or new RX:    RX name and strength: ALPRAZolam (XANAX) 0.5 MG tablet 15 tablet   Directions:   Is this a 30 day or 90 day RX:    Pharmacy name and phone # (DON'T enter "on file" or "in chart"): [facility] [place] [id number] [place] [address] [phone] (Phone)  [phone] (Fax)      Comments:  Please call to confirm      "

## 2018-05-18 RX ORDER — ALPRAZOLAM 0.5 MG/1
TABLET ORAL
Qty: 15 TABLET | Refills: 2 | Status: SHIPPED | OUTPATIENT
Start: 2018-05-18 | End: 2018-08-22 | Stop reason: SDUPTHER

## 2018-05-18 RX ORDER — ALPRAZOLAM 0.5 MG/1
TABLET ORAL
Qty: 15 TABLET | Refills: 3 | OUTPATIENT
Start: 2018-05-18

## 2018-05-23 RX ORDER — ALPRAZOLAM 0.5 MG/1
TABLET ORAL
Qty: 15 TABLET | Refills: 5 | OUTPATIENT
Start: 2018-05-23

## 2018-06-20 ENCOUNTER — LAB VISIT (OUTPATIENT)
Dept: LAB | Facility: HOSPITAL | Age: 68
End: 2018-06-20
Attending: INTERNAL MEDICINE
Payer: MEDICARE

## 2018-06-20 DIAGNOSIS — E11.9 TYPE 2 DIABETES MELLITUS WITHOUT COMPLICATION, WITHOUT LONG-TERM CURRENT USE OF INSULIN: ICD-10-CM

## 2018-06-20 LAB
ALBUMIN SERPL BCP-MCNC: 4 G/DL
ALP SERPL-CCNC: 104 U/L
ALT SERPL W/O P-5'-P-CCNC: 32 U/L
ANION GAP SERPL CALC-SCNC: 10 MMOL/L
AST SERPL-CCNC: 32 U/L
BILIRUB SERPL-MCNC: 0.6 MG/DL
BUN SERPL-MCNC: 26 MG/DL
CALCIUM SERPL-MCNC: 9.5 MG/DL
CHLORIDE SERPL-SCNC: 106 MMOL/L
CO2 SERPL-SCNC: 23 MMOL/L
CREAT SERPL-MCNC: 1.2 MG/DL
EST. GFR  (AFRICAN AMERICAN): >60 ML/MIN/1.73 M^2
EST. GFR  (NON AFRICAN AMERICAN): >60 ML/MIN/1.73 M^2
ESTIMATED AVG GLUCOSE: 154 MG/DL
GLUCOSE SERPL-MCNC: 131 MG/DL
HBA1C MFR BLD HPLC: 7 %
POTASSIUM SERPL-SCNC: 4.6 MMOL/L
PROT SERPL-MCNC: 7.3 G/DL
SODIUM SERPL-SCNC: 139 MMOL/L

## 2018-06-20 PROCEDURE — 83036 HEMOGLOBIN GLYCOSYLATED A1C: CPT

## 2018-06-20 PROCEDURE — 80053 COMPREHEN METABOLIC PANEL: CPT

## 2018-06-20 PROCEDURE — 36415 COLL VENOUS BLD VENIPUNCTURE: CPT

## 2018-06-21 ENCOUNTER — HOSPITAL ENCOUNTER (OUTPATIENT)
Facility: HOSPITAL | Age: 68
Discharge: HOME | End: 2018-06-22
Attending: EMERGENCY MEDICINE | Admitting: HOSPITALIST
Payer: MEDICARE

## 2018-06-21 DIAGNOSIS — R09.02 HYPOXIA: ICD-10-CM

## 2018-06-21 DIAGNOSIS — R19.7 NAUSEA VOMITING AND DIARRHEA: Primary | ICD-10-CM

## 2018-06-21 DIAGNOSIS — R11.2 NAUSEA VOMITING AND DIARRHEA: Primary | ICD-10-CM

## 2018-06-21 DIAGNOSIS — K52.9 GASTROENTERITIS: ICD-10-CM

## 2018-06-21 DIAGNOSIS — N28.9 ACUTE KIDNEY INSUFFICIENCY: ICD-10-CM

## 2018-06-21 LAB
ALBUMIN SERPL BCP-MCNC: 4.3 G/DL
ALP SERPL-CCNC: 107 U/L
ALT SERPL W/O P-5'-P-CCNC: 33 U/L
ANION GAP SERPL CALC-SCNC: 11 MMOL/L
ANION GAP SERPL CALC-SCNC: 14 MMOL/L
AST SERPL-CCNC: 23 U/L
BACTERIA #/AREA URNS HPF: ABNORMAL /HPF
BASOPHILS # BLD AUTO: 0 K/UL
BASOPHILS NFR BLD: 0.2 %
BILIRUB SERPL-MCNC: 1 MG/DL
BILIRUB UR QL STRIP: NEGATIVE
BUN SERPL-MCNC: 41 MG/DL
BUN SERPL-MCNC: 42 MG/DL
CALCIUM SERPL-MCNC: 8.3 MG/DL
CALCIUM SERPL-MCNC: 9.5 MG/DL
CHLORIDE SERPL-SCNC: 107 MMOL/L
CHLORIDE SERPL-SCNC: 113 MMOL/L
CLARITY UR: CLEAR
CO2 SERPL-SCNC: 15 MMOL/L
CO2 SERPL-SCNC: 19 MMOL/L
COLOR UR: YELLOW
CREAT SERPL-MCNC: 1.3 MG/DL
CREAT SERPL-MCNC: 1.6 MG/DL
DIFFERENTIAL METHOD: ABNORMAL
EOSINOPHIL # BLD AUTO: 0.2 K/UL
EOSINOPHIL NFR BLD: 2.6 %
ERYTHROCYTE [DISTWIDTH] IN BLOOD BY AUTOMATED COUNT: 16.3 %
EST. GFR  (AFRICAN AMERICAN): 51 ML/MIN/1.73 M^2
EST. GFR  (AFRICAN AMERICAN): >60 ML/MIN/1.73 M^2
EST. GFR  (NON AFRICAN AMERICAN): 44 ML/MIN/1.73 M^2
EST. GFR  (NON AFRICAN AMERICAN): 57 ML/MIN/1.73 M^2
GLUCOSE SERPL-MCNC: 170 MG/DL
GLUCOSE SERPL-MCNC: 214 MG/DL
GLUCOSE SERPL-MCNC: 241 MG/DL (ref 70–110)
GLUCOSE UR QL STRIP: NEGATIVE
HCT VFR BLD AUTO: 50.1 %
HGB BLD-MCNC: 16.4 G/DL
HGB UR QL STRIP: NEGATIVE
HYALINE CASTS #/AREA URNS LPF: 4 /LPF
KETONES UR QL STRIP: ABNORMAL
LACTATE SERPL-SCNC: 2.8 MMOL/L
LEUKOCYTE ESTERASE UR QL STRIP: NEGATIVE
LIPASE SERPL-CCNC: 10 U/L
LYMPHOCYTES # BLD AUTO: 1.3 K/UL
LYMPHOCYTES NFR BLD: 18.6 %
MCH RBC QN AUTO: 27.8 PG
MCHC RBC AUTO-ENTMCNC: 32.7 G/DL
MCV RBC AUTO: 85 FL
MICROSCOPIC COMMENT: ABNORMAL
MONOCYTES # BLD AUTO: 0.5 K/UL
MONOCYTES NFR BLD: 8.1 %
NEUTROPHILS # BLD AUTO: 4.8 K/UL
NEUTROPHILS NFR BLD: 70.5 %
NITRITE UR QL STRIP: NEGATIVE
PH UR STRIP: 6 [PH] (ref 5–8)
PLATELET # BLD AUTO: 154 K/UL
PMV BLD AUTO: 8.6 FL
POCT GLUCOSE: 100 MG/DL (ref 70–110)
POCT GLUCOSE: 241 MG/DL (ref 70–110)
POTASSIUM SERPL-SCNC: 4.7 MMOL/L
POTASSIUM SERPL-SCNC: 5.2 MMOL/L
PROT SERPL-MCNC: 8.2 G/DL
PROT UR QL STRIP: ABNORMAL
RBC # BLD AUTO: 5.89 M/UL
RBC #/AREA URNS HPF: 2 /HPF (ref 0–4)
SODIUM SERPL-SCNC: 139 MMOL/L
SODIUM SERPL-SCNC: 140 MMOL/L
SP GR UR STRIP: >=1.03 (ref 1–1.03)
SQUAMOUS #/AREA URNS HPF: 2 /HPF
URN SPEC COLLECT METH UR: ABNORMAL
UROBILINOGEN UR STRIP-ACNC: NEGATIVE EU/DL
WBC # BLD AUTO: 6.8 K/UL
WBC #/AREA URNS HPF: 1 /HPF (ref 0–5)

## 2018-06-21 PROCEDURE — 83605 ASSAY OF LACTIC ACID: CPT

## 2018-06-21 PROCEDURE — 96374 THER/PROPH/DIAG INJ IV PUSH: CPT

## 2018-06-21 PROCEDURE — 80048 BASIC METABOLIC PNL TOTAL CA: CPT

## 2018-06-21 PROCEDURE — 25000003 PHARM REV CODE 250: Performed by: NURSE PRACTITIONER

## 2018-06-21 PROCEDURE — G0378 HOSPITAL OBSERVATION PER HR: HCPCS

## 2018-06-21 PROCEDURE — 96372 THER/PROPH/DIAG INJ SC/IM: CPT | Mod: 59

## 2018-06-21 PROCEDURE — 63600175 PHARM REV CODE 636 W HCPCS: Performed by: NURSE PRACTITIONER

## 2018-06-21 PROCEDURE — 36415 COLL VENOUS BLD VENIPUNCTURE: CPT

## 2018-06-21 PROCEDURE — 82962 GLUCOSE BLOOD TEST: CPT | Mod: 59

## 2018-06-21 PROCEDURE — 96361 HYDRATE IV INFUSION ADD-ON: CPT

## 2018-06-21 PROCEDURE — 99284 EMERGENCY DEPT VISIT MOD MDM: CPT | Mod: 25

## 2018-06-21 PROCEDURE — 80053 COMPREHEN METABOLIC PANEL: CPT

## 2018-06-21 PROCEDURE — 83605 ASSAY OF LACTIC ACID: CPT | Mod: 91

## 2018-06-21 PROCEDURE — 87040 BLOOD CULTURE FOR BACTERIA: CPT | Mod: 59

## 2018-06-21 PROCEDURE — 81000 URINALYSIS NONAUTO W/SCOPE: CPT

## 2018-06-21 PROCEDURE — 85025 COMPLETE CBC W/AUTO DIFF WBC: CPT

## 2018-06-21 PROCEDURE — 83690 ASSAY OF LIPASE: CPT

## 2018-06-21 RX ORDER — DICYCLOMINE HYDROCHLORIDE 10 MG/ML
20 INJECTION INTRAMUSCULAR
Status: COMPLETED | OUTPATIENT
Start: 2018-06-21 | End: 2018-06-21

## 2018-06-21 RX ORDER — AMOXICILLIN 250 MG
1 CAPSULE ORAL 2 TIMES DAILY PRN
Status: DISCONTINUED | OUTPATIENT
Start: 2018-06-21 | End: 2018-06-22 | Stop reason: HOSPADM

## 2018-06-21 RX ORDER — ACETAMINOPHEN 325 MG/1
650 TABLET ORAL EVERY 6 HOURS PRN
Status: DISCONTINUED | OUTPATIENT
Start: 2018-06-21 | End: 2018-06-22 | Stop reason: HOSPADM

## 2018-06-21 RX ORDER — ALPRAZOLAM 0.25 MG/1
0.25 TABLET ORAL NIGHTLY PRN
Status: DISCONTINUED | OUTPATIENT
Start: 2018-06-21 | End: 2018-06-22 | Stop reason: HOSPADM

## 2018-06-21 RX ORDER — ATORVASTATIN CALCIUM 40 MG/1
40 TABLET, FILM COATED ORAL DAILY
Status: DISCONTINUED | OUTPATIENT
Start: 2018-06-22 | End: 2018-06-22 | Stop reason: HOSPADM

## 2018-06-21 RX ORDER — IBUPROFEN 200 MG
24 TABLET ORAL
Status: DISCONTINUED | OUTPATIENT
Start: 2018-06-21 | End: 2018-06-22 | Stop reason: HOSPADM

## 2018-06-21 RX ORDER — ONDANSETRON 2 MG/ML
4 INJECTION INTRAMUSCULAR; INTRAVENOUS EVERY 6 HOURS PRN
Status: DISCONTINUED | OUTPATIENT
Start: 2018-06-21 | End: 2018-06-22 | Stop reason: HOSPADM

## 2018-06-21 RX ORDER — PANTOPRAZOLE SODIUM 40 MG/1
40 TABLET, DELAYED RELEASE ORAL DAILY
Status: DISCONTINUED | OUTPATIENT
Start: 2018-06-22 | End: 2018-06-22 | Stop reason: HOSPADM

## 2018-06-21 RX ORDER — GABAPENTIN 300 MG/1
300 CAPSULE ORAL NIGHTLY
Status: DISCONTINUED | OUTPATIENT
Start: 2018-06-21 | End: 2018-06-22 | Stop reason: HOSPADM

## 2018-06-21 RX ORDER — SODIUM CHLORIDE 9 MG/ML
INJECTION, SOLUTION INTRAVENOUS CONTINUOUS
Status: ACTIVE | OUTPATIENT
Start: 2018-06-21 | End: 2018-06-22

## 2018-06-21 RX ORDER — IBUPROFEN 200 MG
16 TABLET ORAL
Status: DISCONTINUED | OUTPATIENT
Start: 2018-06-21 | End: 2018-06-22 | Stop reason: HOSPADM

## 2018-06-21 RX ORDER — TAMSULOSIN HYDROCHLORIDE 0.4 MG/1
0.4 CAPSULE ORAL DAILY
Status: DISCONTINUED | OUTPATIENT
Start: 2018-06-22 | End: 2018-06-22 | Stop reason: HOSPADM

## 2018-06-21 RX ORDER — ONDANSETRON 4 MG/1
8 TABLET, ORALLY DISINTEGRATING ORAL EVERY 6 HOURS PRN
Status: DISCONTINUED | OUTPATIENT
Start: 2018-06-21 | End: 2018-06-22 | Stop reason: HOSPADM

## 2018-06-21 RX ORDER — IPRATROPIUM BROMIDE AND ALBUTEROL SULFATE 2.5; .5 MG/3ML; MG/3ML
3 SOLUTION RESPIRATORY (INHALATION) EVERY 4 HOURS PRN
Status: DISCONTINUED | OUTPATIENT
Start: 2018-06-21 | End: 2018-06-22 | Stop reason: HOSPADM

## 2018-06-21 RX ORDER — TRAMADOL HYDROCHLORIDE 50 MG/1
50 TABLET ORAL EVERY 12 HOURS PRN
Status: DISCONTINUED | OUTPATIENT
Start: 2018-06-21 | End: 2018-06-22 | Stop reason: HOSPADM

## 2018-06-21 RX ORDER — ONDANSETRON 2 MG/ML
4 INJECTION INTRAMUSCULAR; INTRAVENOUS
Status: COMPLETED | OUTPATIENT
Start: 2018-06-21 | End: 2018-06-21

## 2018-06-21 RX ORDER — BISACODYL 10 MG
10 SUPPOSITORY, RECTAL RECTAL DAILY PRN
Status: DISCONTINUED | OUTPATIENT
Start: 2018-06-21 | End: 2018-06-22 | Stop reason: HOSPADM

## 2018-06-21 RX ORDER — GLUCAGON 1 MG
1 KIT INJECTION
Status: DISCONTINUED | OUTPATIENT
Start: 2018-06-21 | End: 2018-06-22 | Stop reason: HOSPADM

## 2018-06-21 RX ORDER — INSULIN ASPART 100 [IU]/ML
0-5 INJECTION, SOLUTION INTRAVENOUS; SUBCUTANEOUS
Status: DISCONTINUED | OUTPATIENT
Start: 2018-06-21 | End: 2018-06-22 | Stop reason: HOSPADM

## 2018-06-21 RX ORDER — POLYETHYLENE GLYCOL 3350 17 G/17G
17 POWDER, FOR SOLUTION ORAL 2 TIMES DAILY PRN
Status: DISCONTINUED | OUTPATIENT
Start: 2018-06-21 | End: 2018-06-22 | Stop reason: HOSPADM

## 2018-06-21 RX ORDER — SODIUM CHLORIDE 0.9 % (FLUSH) 0.9 %
5 SYRINGE (ML) INJECTION
Status: DISCONTINUED | OUTPATIENT
Start: 2018-06-21 | End: 2018-06-22 | Stop reason: HOSPADM

## 2018-06-21 RX ORDER — ALLOPURINOL 100 MG/1
100 TABLET ORAL DAILY
Status: DISCONTINUED | OUTPATIENT
Start: 2018-06-22 | End: 2018-06-22 | Stop reason: HOSPADM

## 2018-06-21 RX ORDER — OMEGA-3/DHA/EPA/FISH OIL 300-1000MG
1 CAPSULE,DELAYED RELEASE (ENTERIC COATED) ORAL DAILY
Status: DISCONTINUED | OUTPATIENT
Start: 2018-06-22 | End: 2018-06-22

## 2018-06-21 RX ORDER — RAMELTEON 8 MG/1
8 TABLET ORAL NIGHTLY PRN
Status: DISCONTINUED | OUTPATIENT
Start: 2018-06-21 | End: 2018-06-22 | Stop reason: HOSPADM

## 2018-06-21 RX ORDER — DULOXETIN HYDROCHLORIDE 30 MG/1
60 CAPSULE, DELAYED RELEASE ORAL DAILY
Status: DISCONTINUED | OUTPATIENT
Start: 2018-06-22 | End: 2018-06-22 | Stop reason: HOSPADM

## 2018-06-21 RX ADMIN — SODIUM CHLORIDE 1000 ML: 0.9 INJECTION, SOLUTION INTRAVENOUS at 06:06

## 2018-06-21 RX ADMIN — SODIUM CHLORIDE: 0.9 INJECTION, SOLUTION INTRAVENOUS at 11:06

## 2018-06-21 RX ADMIN — ONDANSETRON 4 MG: 2 INJECTION INTRAMUSCULAR; INTRAVENOUS at 05:06

## 2018-06-21 RX ADMIN — SODIUM CHLORIDE 1000 ML: 0.9 INJECTION, SOLUTION INTRAVENOUS at 04:06

## 2018-06-21 RX ADMIN — SODIUM CHLORIDE 1000 ML: 0.9 INJECTION, SOLUTION INTRAVENOUS at 08:06

## 2018-06-21 RX ADMIN — DICYCLOMINE HYDROCHLORIDE 20 MG: 10 INJECTION INTRAMUSCULAR at 05:06

## 2018-06-21 NOTE — ED NOTES
Assumed care:  Patient awake, alert and oriented x 3, skin warm and dry, in NAD with family at bedside.    Patient identifiers for Ellis Hatch checked and correct.  LOC:  Patient is awake, alert, and aware of environment with an appropriate affect. Patient is oriented x 3 and speaking appropriately.  APPEARANCE:  Patient resting comfortably and in no acute distress. Patient is clean and well groomed, patient's clothing is properly fastened.  SKIN:  The skin is warm and dry. Patient has normal skin turgor and moist mucus membranes. Skin is intact; no bruising or breakdown noted.  MUSCULOSKELETAL:  Patient is moving all extremities well, no obvious deformities noted. Pulses intact.   RESPIRATORY:  Airway is open and patent. Respirations are spontaneous and non-labored with normal effort and rate.  CARDIAC:  Patient has a normal rate and rhythm. No peripheral edema noted. Capillary refill < 3 seconds.  ABDOMEN:  No distention noted.  Soft and non-tender upon palpation.  CO NV&D with abd cramping.  NEUROLOGICAL:  PERRL. Facial expression is symmetrical. Hand grasps are equal bilaterally. Normal sensation in all extremities when touched with finger.  Allergies reported:    Review of patient's allergies indicates:   Allergen Reactions    Aspirin      Due to acute kidney injury in 11/2013    Nsaids (non-steroidal anti-inflammatory drug)      Due to acute kidney injury in 11/2013    Phenergan [promethazine] Other (See Comments)     Sedates patient    Doxycycline Nausea Only     OTHER NOTES:
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
## 2018-06-22 VITALS
OXYGEN SATURATION: 97 % | HEIGHT: 72 IN | TEMPERATURE: 99 F | SYSTOLIC BLOOD PRESSURE: 130 MMHG | WEIGHT: 215.38 LBS | BODY MASS INDEX: 29.17 KG/M2 | RESPIRATION RATE: 18 BRPM | HEART RATE: 83 BPM | DIASTOLIC BLOOD PRESSURE: 63 MMHG

## 2018-06-22 PROBLEM — A41.9 SEVERE SEPSIS: Status: ACTIVE | Noted: 2018-06-22

## 2018-06-22 PROBLEM — N18.30 CKD (CHRONIC KIDNEY DISEASE) STAGE 3, GFR 30-59 ML/MIN: Status: ACTIVE | Noted: 2018-06-22

## 2018-06-22 PROBLEM — R65.20 SEVERE SEPSIS: Status: ACTIVE | Noted: 2018-06-22

## 2018-06-22 PROBLEM — E87.20 METABOLIC ACIDOSIS: Status: ACTIVE | Noted: 2018-06-22

## 2018-06-22 LAB
ALBUMIN SERPL BCP-MCNC: 3 G/DL
ALP SERPL-CCNC: 70 U/L
ALT SERPL W/O P-5'-P-CCNC: 21 U/L
ANION GAP SERPL CALC-SCNC: 9 MMOL/L
AST SERPL-CCNC: 19 U/L
BASOPHILS # BLD AUTO: 0 K/UL
BASOPHILS NFR BLD: 0.2 %
BILIRUB SERPL-MCNC: 0.9 MG/DL
BUN SERPL-MCNC: 36 MG/DL
CALCIUM SERPL-MCNC: 8 MG/DL
CHLORIDE SERPL-SCNC: 112 MMOL/L
CO2 SERPL-SCNC: 17 MMOL/L
CREAT SERPL-MCNC: 1.1 MG/DL
CREAT UR-MCNC: 128.9 MG/DL
DIFFERENTIAL METHOD: ABNORMAL
EOSINOPHIL # BLD AUTO: 0.2 K/UL
EOSINOPHIL NFR BLD: 2.6 %
ERYTHROCYTE [DISTWIDTH] IN BLOOD BY AUTOMATED COUNT: 16.3 %
EST. GFR  (AFRICAN AMERICAN): >60 ML/MIN/1.73 M^2
EST. GFR  (NON AFRICAN AMERICAN): >60 ML/MIN/1.73 M^2
GLUCOSE SERPL-MCNC: 93 MG/DL
HCT VFR BLD AUTO: 38.2 %
HGB BLD-MCNC: 12.8 G/DL
LACTATE SERPL-SCNC: 1.9 MMOL/L
LACTATE SERPL-SCNC: 2.8 MMOL/L
LYMPHOCYTES # BLD AUTO: 1.5 K/UL
LYMPHOCYTES NFR BLD: 20.5 %
MAGNESIUM SERPL-MCNC: 1.8 MG/DL
MCH RBC QN AUTO: 28.2 PG
MCHC RBC AUTO-ENTMCNC: 33.5 G/DL
MCV RBC AUTO: 84 FL
MONOCYTES # BLD AUTO: 0.8 K/UL
MONOCYTES NFR BLD: 10.2 %
NEUTROPHILS # BLD AUTO: 4.9 K/UL
NEUTROPHILS NFR BLD: 66.5 %
PHOSPHATE SERPL-MCNC: 2.5 MG/DL
PLATELET # BLD AUTO: 131 K/UL
PMV BLD AUTO: 8.7 FL
POCT GLUCOSE: 114 MG/DL (ref 70–110)
POCT GLUCOSE: 92 MG/DL (ref 70–110)
POTASSIUM SERPL-SCNC: 4.4 MMOL/L
PROT SERPL-MCNC: 5.8 G/DL
RBC # BLD AUTO: 4.53 M/UL
SODIUM SERPL-SCNC: 138 MMOL/L
SODIUM UR-SCNC: 36 MMOL/L
TSH SERPL DL<=0.005 MIU/L-ACNC: 0.81 UIU/ML
WBC # BLD AUTO: 7.4 K/UL

## 2018-06-22 PROCEDURE — 36415 COLL VENOUS BLD VENIPUNCTURE: CPT

## 2018-06-22 PROCEDURE — 25000003 PHARM REV CODE 250: Performed by: NURSE PRACTITIONER

## 2018-06-22 PROCEDURE — 83605 ASSAY OF LACTIC ACID: CPT

## 2018-06-22 PROCEDURE — G0378 HOSPITAL OBSERVATION PER HR: HCPCS

## 2018-06-22 PROCEDURE — 94761 N-INVAS EAR/PLS OXIMETRY MLT: CPT

## 2018-06-22 PROCEDURE — 25000003 PHARM REV CODE 250: Performed by: HOSPITALIST

## 2018-06-22 PROCEDURE — 85025 COMPLETE CBC W/AUTO DIFF WBC: CPT

## 2018-06-22 PROCEDURE — 96361 HYDRATE IV INFUSION ADD-ON: CPT

## 2018-06-22 PROCEDURE — 84100 ASSAY OF PHOSPHORUS: CPT

## 2018-06-22 PROCEDURE — 84443 ASSAY THYROID STIM HORMONE: CPT

## 2018-06-22 PROCEDURE — 84300 ASSAY OF URINE SODIUM: CPT

## 2018-06-22 PROCEDURE — 80053 COMPREHEN METABOLIC PANEL: CPT

## 2018-06-22 PROCEDURE — 96360 HYDRATION IV INFUSION INIT: CPT

## 2018-06-22 PROCEDURE — 83735 ASSAY OF MAGNESIUM: CPT

## 2018-06-22 PROCEDURE — 82570 ASSAY OF URINE CREATININE: CPT

## 2018-06-22 PROCEDURE — 82962 GLUCOSE BLOOD TEST: CPT

## 2018-06-22 RX ORDER — SODIUM CHLORIDE 9 MG/ML
INJECTION, SOLUTION INTRAVENOUS CONTINUOUS
Status: DISCONTINUED | OUTPATIENT
Start: 2018-06-22 | End: 2018-06-22 | Stop reason: HOSPADM

## 2018-06-22 RX ORDER — GLUCOSAM/CHONDRO/HERB 149/HYAL 750-100 MG
1 TABLET ORAL DAILY
Status: DISCONTINUED | OUTPATIENT
Start: 2018-06-22 | End: 2018-06-22 | Stop reason: HOSPADM

## 2018-06-22 RX ADMIN — PANTOPRAZOLE SODIUM 40 MG: 40 TABLET, DELAYED RELEASE ORAL at 08:06

## 2018-06-22 RX ADMIN — SODIUM CHLORIDE: 0.9 INJECTION, SOLUTION INTRAVENOUS at 08:06

## 2018-06-22 RX ADMIN — GABAPENTIN 300 MG: 300 CAPSULE ORAL at 12:06

## 2018-06-22 RX ADMIN — ALLOPURINOL 100 MG: 100 TABLET ORAL at 08:06

## 2018-06-22 RX ADMIN — DULOXETINE HYDROCHLORIDE 60 MG: 30 CAPSULE, DELAYED RELEASE ORAL at 08:06

## 2018-06-22 RX ADMIN — TAMSULOSIN HYDROCHLORIDE 0.4 MG: 0.4 CAPSULE ORAL at 08:06

## 2018-06-22 RX ADMIN — ATORVASTATIN CALCIUM 40 MG: 40 TABLET, FILM COATED ORAL at 08:06

## 2018-06-22 RX ADMIN — OMEGA-3 FATTY ACIDS CAP 1000 MG 1 CAPSULE: 1000 CAP at 08:06

## 2018-06-22 RX ADMIN — ACETAMINOPHEN 650 MG: 325 TABLET, FILM COATED ORAL at 12:06
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
## 2018-06-22 NOTE — ASSESSMENT & PLAN NOTE
- HgA1c 7.0 with initial labs  - hold oral diabetic agents for now  - ISS   - start clears and advance to diabetic diet as tolerated

## 2018-06-22 NOTE — NURSING
New pt is co of chill when came to room and has a temp of 102.5, would you like to order any blood culture? TIMBO Christian notified.

## 2018-06-22 NOTE — PLAN OF CARE
Discharged home prior to assessment being completed.       06/22/18 1453   Discharge Assessment   Assessment Type Discharge Planning Assessment

## 2018-06-22 NOTE — PLAN OF CARE
06/22/18 0810   PRE-TX-O2-ETCO2   O2 Device (Oxygen Therapy) room air   SpO2 (!) 94 %   Pulse Oximetry Type Intermittent   $ Pulse Oximetry - Multiple Charge Pulse Oximetry - Multiple

## 2018-06-22 NOTE — HPI
69 y/o gentleman, who presents to the ED with c/o ABD cramping, N/V/D, dizziness, fever, and chills.  He has a PMH of HTN, CKD, and DM. He reports that he was recently in the company of his grandchild who had similar symptoms.  He states that symptoms started this afternoon around 3pm and have been ongoing since.  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   He denies CP, SOB, BRRB, dark tarry stools, or  symptoms.  Initial workup reveals no leukocytosis, temp 98.3 on arrival, lactic 2.8, Crt 1.3, CXR without acute findings.

## 2018-06-22 NOTE — HOSPITAL COURSE
67 y/o gentleman, who presents to the ED with c/o ABD cramping, N/V/D, dizziness, fever, and chills.  He has a PMH of HTN, CKD, and DM. He reports that he was recently in the company of his grandchild who had similar symptoms. His daughter now has symptoms also. He had severe sepsis based on fever and tachycardia with source -GI and NAGMA. This resolved with  ivf  Per sepsis protocol. He had no further v/d after bowel rest overnight, tolerated po therefore dc home.   PE -ALERT nad  heent-nontraumatic, oral mmm   lungs clear   cor rrr   abd soft, bsx4,  ext -no c/c/e   neuro-Ox3, sensation intact   skin -w/d  Psych-cooperative and calm. Insight good

## 2018-06-22 NOTE — ASSESSMENT & PLAN NOTE
- Crt 1.6 with initial labs - repeat down to 1.3  - likely r/t profound volume depletion   - received 3L of NS while in ED   - continue MIVF's overnight and reassess fluid status in AM  - avoid nephrotoxins if possible  - strict I/O's  - daily Wt's

## 2018-06-22 NOTE — PLAN OF CARE
Problem: Patient Care Overview  Goal: Plan of Care Review  Outcome: Ongoing (interventions implemented as appropriate)   06/22/18 3399   Coping/Psychosocial   Plan Of Care Reviewed With patient   Alert and oriented. NAD noted. Spouse at bedside during admit. VSS. Urinal at bedside.BG monitored. PRN tylenol given for pain. Denies sob, chest pain, n/v. Free of fall or injury. Side rails upx2. Call light in reach. Will continue to monitor.

## 2018-06-22 NOTE — DISCHARGE SUMMARY
[facility] Hospital Medicine  Discharge Summary      Patient Name: Ellis Hatch  MRN: [id number]  Admission Date: 6/21/2018  Hospital Length of Stay: 0 days  Discharge Date and Time: No discharge date for patient encounter.  Attending Physician: Katerin James MD   Discharging Provider: Katerin James MD  Primary Care Provider: Eddie Delaney MD      HPI:   69 y/o gentleman, who presents to the ED with c/o ABD cramping, N/V/D, dizziness, fever, and chills.  He has a PMH of HTN, CKD, and DM. He reports that he was recently in the company of his grandchild who had similar symptoms.  He states that symptoms started this afternoon around 3pm and have been ongoing since.  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   He denies CP, SOB, BRRB, dark tarry stools, or  symptoms.  Initial workup reveals no leukocytosis, temp 98.3 on arrival, lactic 2.8, Crt 1.3, CXR without acute findings.      * No surgery found *      Hospital Course:   69 y/o gentleman, who presents to the ED with c/o ABD cramping, N/V/D, dizziness, fever, and chills.  He has a PMH of HTN, CKD, and DM. He reports that he was recently in the company of his grandchild who had similar symptoms. His daughter now has symptoms also. He had severe sepsis based on fever and tachycardia with source -GI and NAGMA. This resolved with  ivf  Per sepsis protocol. He had no further v/d after bowel rest overnight, tolerated po therefore dc home.   PE -ALERT nad  heent-nontraumatic, oral mmm   lungs clear   cor rrr   abd soft, bsx4,  ext -no c/c/e   neuro-Ox3, sensation intact   skin -w/d  Psych-cooperative and calm. Insight good   He also had slight drop in platelets to 131 -this can be followed up as outpt in next 1-2 weeks.   Consults:     No new Assessment & Plan notes have been filed under this hospital service since the last note was generated.  Service:  Hospital Medicine    Final Active Diagnoses:    Diagnosis Date Noted POA    PRINCIPAL PROBLEM:  Gastroenteritis [K52.9] 06/21/2018 Yes    Metabolic acidosis [E87.2] 06/22/2018 Yes    Severe sepsis [A41.9, R65.20] 06/22/2018 Yes    Anxiety [F41.9] 03/11/2014 Yes    GERD (gastroesophageal reflux disease) [K21.9] 03/11/2014 Yes    Hyperlipemia [E78.5] 03/11/2014 Yes    Type 2 diabetes mellitus without complication [E11.9] 12/30/2013 Yes      Problems Resolved During this Admission:    Diagnosis Date Noted Date Resolved POA       Discharged Condition: good    Disposition: Home or Self Care    Follow Up:  Follow-up Information     Eddie Delaney MD In 1 week.    Specialty:  Internal Medicine  Contact information:  [address] [facility] [address] [phone]             Please follow up.    Why:  As needed               Patient Instructions:     Diet diabetic     Activity as tolerated         Significant Diagnostic Studies:   Results for ROLANDO MOSER (MRN [id number]) as of 6/22/2018 13:56   Ref. Range 6/21/2018 19:47 6/21/2018 20:53 6/21/2018 23:36 6/22/2018 04:36   Sodium Latest Ref Range: 136 - 145 mmol/L 139   138   Potassium Latest Ref Range: 3.5 - 5.1 mmol/L 4.7   4.4   Chloride Latest Ref Range: 95 - 110 mmol/L 113 (H)   112 (H)   CO2 Latest Ref Range: 23 - 29 mmol/L 15 (L)   17 (L)   Anion Gap Latest Ref Range: 8 - 16 mmol/L 11   9   BUN, Bld Latest Ref Range: 8 - 23 mg/dL 41 (H)   36 (H)   Creatinine Latest Ref Range: 0.5 - 1.4 mg/dL 1.3   1.1   eGFR if non African American Latest Ref Range: >60 mL/min/1.73 m^2 57 (A)   >60   eGFR if  Latest Ref Range: >60 mL/min/1.73 m^2 >60   >60   Glucose Latest Ref Range: 70 - 110 mg/dL 170 (H)   93   Calcium Latest Ref Range: 8.7 - 10.5 mg/dL 8.3 (L)   8.0 (L)   Phosphorus Latest Ref Range: 2.7 - 4.5 mg/dL    2.5 (L)   Magnesium Latest Ref Range: 1.6 - 2.6 mg/dL    1.8   Alkaline Phosphatase Latest Ref Range: 55 - 135 U/L    70   Total  Protein Latest Ref Range: 6.0 - 8.4 g/dL    5.8 (L)   Albumin Latest Ref Range: 3.5 - 5.2 g/dL    3.0 (L)   Total Bilirubin Latest Ref Range: 0.1 - 1.0 mg/dL    0.9   AST Latest Ref Range: 10 - 40 U/L    19   ALT Latest Ref Range: 10 - 44 U/L    21       Results for ROLANDO MOSER (MRN [id number]) as of 6/22/2018 13:56   Ref. Range 6/21/2018 20:53 6/21/2018 23:36 6/22/2018 04:36   Lactate, Chuck Latest Ref Range: 0.5 - 2.2 mmol/L 2.8 (H) 2.8 (H) 1.9       Results for ROLANDO MOSER (MRN [id number]) as of 6/22/2018 13:56   Ref. Range 6/21/2018 16:55 6/22/2018 04:36   WBC Latest Ref Range: 3.90 - 12.70 K/uL 6.80 7.40   RBC Latest Ref Range: 4.60 - 6.20 M/uL 5.89 4.53 (L)   Hemoglobin Latest Ref Range: 14.0 - 18.0 g/dL 16.4 12.8 (L)   Hematocrit Latest Ref Range: 40.0 - 54.0 % 50.1 38.2 (L)   MCV Latest Ref Range: 82 - 98 fL 85 84   MCH Latest Ref Range: 27.0 - 31.0 pg 27.8 28.2   MCHC Latest Ref Range: 32.0 - 36.0 g/dL 32.7 33.5   RDW Latest Ref Range: 11.5 - 14.5 % 16.3 (H) 16.3 (H)   Platelets Latest Ref Range: 150 - 350 K/uL 154 131 (L)   Impression       No active disease or interval change.  A       Blood culture pending at [place] -will notify patient if any bacteria but source likely viral GE based on history.   Pending Diagnostic Studies:     None         Medications:  Reconciled Home Medications:      Medication List      CONTINUE taking these medications    allopurinol 100 MG tablet  Commonly known as:  ZYLOPRIM  TAKE 1 TABLET ONCE DAILY TO PREVENT GOUT     ALPRAZolam 0.5 MG tablet  Commonly known as:  XANAX  TAKE 1/2 TABLET BY MOUTH AT BEDTIME AS NEEDED     atorvastatin 40 MG tablet  Commonly known as:  LIPITOR  TAKE 1 TABLET ONCE DAILY FOR CHOLESTEROL     blood sugar diagnostic Strp  Use to check glucose daily     blood-glucose meter kit  Use as instructed     CENTRUM SILVER ORAL  Take 1 tablet by mouth once daily.     COSAMIN -200-100 mg Cap  Generic drug:  glucosamine-condroitin-herb182  Take 4  tablets by mouth once daily.     DULoxetine 60 MG capsule  Commonly known as:  CYMBALTA  TAKE 1 CAPSULE ONCE DAILY     fish oil-omega-3 fatty acids 300-1,000 mg capsule  Take 2 g by mouth once daily.     gabapentin 300 MG capsule  Commonly known as:  NEURONTIN  Take 1 capsule (300 mg total) by mouth every evening.     glimepiride 4 MG tablet  Commonly known as:  AMARYL  TAKE 1 TABLET TWICE DAILY     lancets Misc  Use to check glucose daily     magnesium citrate (bulk) Powd  325 mg by Misc.(Non-Drug; Combo Route) route every other day.     metFORMIN 500 MG tablet  Commonly known as:  GLUCOPHAGE  TAKE 2 TABLETS TWICE DAILY WITH MEALS (BREAKFAST AND SUPPER)     pantoprazole 40 MG tablet  Commonly known as:  PROTONIX  TAKE 1 TABLET EVERY MORNING BEFORE BREAKFAST     SITagliptin 100 MG Tab  Commonly known as:  JANUVIA  Take 1 tablet (100 mg total) by mouth once daily.     tamsulosin 0.4 mg Cp24  Commonly known as:  FLOMAX  TAKE 1 CAPSULE ONCE DAILY FOR PROSTATE     traMADol 50 mg tablet  Commonly known as:  ULTRAM  Take 1 tablet (50 mg total) by mouth every 12 (twelve) hours as needed.            Indwelling Lines/Drains at time of discharge:   Lines/Drains/Airways          No matching active lines, drains, or airways          Time spent on the discharge of patient: 32 minutes  Patient was seen and examined on the date of discharge and determined to be suitable for discharge.         Katerin James MD  Department of Hospital Medicine  [facility]

## 2018-06-22 NOTE — PROGRESS NOTES
Notified by [facility] that pt had a BM and sample was collected.  Sample found to be formed stool.  Unable to send to lab for C. Diff testing.

## 2018-06-22 NOTE — ED NOTES
Patient resting, no apparent distress, no complaints, family @ bs, will monitor, awaiting nurse to call when patient room is ready.

## 2018-06-22 NOTE — ED NOTES
Patient resting, no apparent distress, no complaints, family @ bs, will monitor, awaiting disposition.

## 2018-06-22 NOTE — PLAN OF CARE
06/22/18 1454   Final Note   Assessment Type Final Discharge Note   Discharge Disposition Home

## 2018-06-22 NOTE — PROGRESS NOTES
Pt cleared for d/c.  Education provided regarding home medication regimen, follow up appts, when to seek medical attention, pneumonia, CKD, DM-2 and C. Diff.  Verbalized understanding.  Pt transported to main lobby via w/c by [facility].  Pt to be driven home by spouse.  Pt safe.
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
## 2018-06-22 NOTE — ASSESSMENT & PLAN NOTE
- presents with N/V/D, abdominal cramping, fever, chills, and dizziness  - reports recent exposure to ill individual (grandchild)  - no leukocytosis, afebrile, c/o chills  - likely viral gastroenteritis   - tylenol PRN temps > 101  -  lactic 2.8 on arrival - possibly elevated d/t volume depletion, n/v/d, and metformin usage - repeat lactic pending   - PRN antiemetics  - received 3 L NS while in ED  - continue MIVF's overnight and reassess fluid status in AM  - start clear liquids and advance as tolerated to ADA diet   - CXR / UA unremarkable

## 2018-06-22 NOTE — UM SECONDARY REVIEW
Physician Advisor External    Level of Care Issue    Approved Inpatient for admit 6/21/2018 per Dr. Charles at [facility].  Level of care discussed with ALLAN Hare RN.  Per MD, plan for pt to discharge today.  Pt will remain in OBS.

## 2018-06-25 ENCOUNTER — TELEPHONE (OUTPATIENT)
Dept: MEDSURG UNIT | Facility: HOSPITAL | Age: 68
End: 2018-06-25

## 2018-06-27 ENCOUNTER — OFFICE VISIT (OUTPATIENT)
Dept: INTERNAL MEDICINE | Facility: CLINIC | Age: 68
End: 2018-06-27
Payer: MEDICARE

## 2018-06-27 VITALS
WEIGHT: 219.38 LBS | HEIGHT: 72 IN | DIASTOLIC BLOOD PRESSURE: 70 MMHG | OXYGEN SATURATION: 95 % | SYSTOLIC BLOOD PRESSURE: 132 MMHG | HEART RATE: 74 BPM | BODY MASS INDEX: 29.71 KG/M2

## 2018-06-27 DIAGNOSIS — F41.9 ANXIETY: ICD-10-CM

## 2018-06-27 DIAGNOSIS — R19.7 NAUSEA VOMITING AND DIARRHEA: ICD-10-CM

## 2018-06-27 DIAGNOSIS — E11.9 TYPE 2 DIABETES MELLITUS WITHOUT COMPLICATION, WITHOUT LONG-TERM CURRENT USE OF INSULIN: Primary | ICD-10-CM

## 2018-06-27 DIAGNOSIS — R11.2 NAUSEA VOMITING AND DIARRHEA: ICD-10-CM

## 2018-06-27 DIAGNOSIS — E78.5 HYPERLIPIDEMIA, UNSPECIFIED HYPERLIPIDEMIA TYPE: ICD-10-CM

## 2018-06-27 DIAGNOSIS — Z96.653 STATUS POST TOTAL BILATERAL KNEE REPLACEMENT: ICD-10-CM

## 2018-06-27 DIAGNOSIS — N18.30 CKD (CHRONIC KIDNEY DISEASE) STAGE 3, GFR 30-59 ML/MIN: ICD-10-CM

## 2018-06-27 PROBLEM — R65.20 SEVERE SEPSIS: Status: RESOLVED | Noted: 2018-06-22 | Resolved: 2018-06-27

## 2018-06-27 PROBLEM — A41.9 SEVERE SEPSIS: Status: RESOLVED | Noted: 2018-06-22 | Resolved: 2018-06-27

## 2018-06-27 PROBLEM — E87.20 METABOLIC ACIDOSIS: Status: RESOLVED | Noted: 2018-06-22 | Resolved: 2018-06-27

## 2018-06-27 LAB
BACTERIA BLD CULT: NORMAL
BACTERIA BLD CULT: NORMAL

## 2018-06-27 PROCEDURE — 99214 OFFICE O/P EST MOD 30 MIN: CPT | Mod: S$PBB,,, | Performed by: INTERNAL MEDICINE

## 2018-06-27 PROCEDURE — 99999 PR PBB SHADOW E&M-EST. PATIENT-LVL III: CPT | Mod: PBBFAC,,, | Performed by: INTERNAL MEDICINE

## 2018-06-27 PROCEDURE — 99213 OFFICE O/P EST LOW 20 MIN: CPT | Mod: PBBFAC | Performed by: INTERNAL MEDICINE

## 2018-06-27 RX ORDER — METFORMIN HYDROCHLORIDE 500 MG/1
TABLET ORAL
Qty: 360 TABLET | Refills: 3 | Status: SHIPPED | OUTPATIENT
Start: 2018-06-27 | End: 2019-07-03 | Stop reason: SDUPTHER

## 2018-06-27 RX ORDER — DULOXETIN HYDROCHLORIDE 60 MG/1
60 CAPSULE, DELAYED RELEASE ORAL DAILY
Qty: 30 CAPSULE | Refills: 11 | Status: SHIPPED | OUTPATIENT
Start: 2018-06-27 | End: 2019-07-03 | Stop reason: SDUPTHER

## 2018-06-27 RX ORDER — ALLOPURINOL 100 MG/1
TABLET ORAL
Qty: 30 TABLET | Refills: 12 | Status: SHIPPED | OUTPATIENT
Start: 2018-06-27 | End: 2018-08-30

## 2018-06-27 RX ORDER — TAMSULOSIN HYDROCHLORIDE 0.4 MG/1
CAPSULE ORAL
Qty: 30 CAPSULE | Refills: 12 | Status: SHIPPED | OUTPATIENT
Start: 2018-06-27 | End: 2019-07-03 | Stop reason: SDUPTHER

## 2018-06-27 RX ORDER — GLIMEPIRIDE 4 MG/1
TABLET ORAL
Qty: 60 TABLET | Refills: 9 | Status: SHIPPED | OUTPATIENT
Start: 2018-06-27 | End: 2019-05-15 | Stop reason: SDUPTHER

## 2018-06-27 RX ORDER — PANTOPRAZOLE SODIUM 40 MG/1
TABLET, DELAYED RELEASE ORAL
Qty: 90 TABLET | Refills: 3 | Status: SHIPPED | OUTPATIENT
Start: 2018-06-27 | End: 2019-09-04 | Stop reason: SDUPTHER

## 2018-06-27 RX ORDER — ATORVASTATIN CALCIUM 40 MG/1
TABLET, FILM COATED ORAL
Qty: 30 TABLET | Refills: 11 | Status: SHIPPED | OUTPATIENT
Start: 2018-06-27 | End: 2019-07-03 | Stop reason: SDUPTHER

## 2018-06-27 RX ORDER — GABAPENTIN 300 MG/1
300 CAPSULE ORAL 2 TIMES DAILY
Qty: 180 CAPSULE | Refills: 11 | Status: SHIPPED | OUTPATIENT
Start: 2018-06-27 | End: 2019-07-10 | Stop reason: SDUPTHER

## 2018-06-28 NOTE — PROGRESS NOTES
"HISTORY OF PRESENT ILLNESS:   Mr. Hatch comes in today to follow up his back   pain.  He had been on tramadol for this.  The last refill of tramadol was over a   month ago.  He is no longer taking it for his back pain.  He has significant   history of chronic kidney disease and some acute kidney injury in the past.  He   is avoiding nonsteroidals, only taking Tylenol for his back and says he has been   doing pretty well.  Recent creatinine is 1.1.  He also was recently   hospitalized when he got blood work done on the 20th, creatinine of 1.2, and   then he developed diarrhea and nausea and vomiting before he came to see me and   up in the ER and he got 5 liters of fluid due to dehydration, and he says he is   feeling much better.  He is eating and drinking well without any difficulty.    Otherwise, he denies any new complaints.    PAST MEDICAL HISTORY:  Significant for chronic kidney disease, acute kidney   injury at times including his most recent dehydration, diabetes mellitus type 2.    He will get Amaryl and Januvia.  He is also on metformin and has not had a   metabolic acidosis recently, but when he was hospitalized recently he has been   in acute kidney injury.  His   did drop down to 15 before it start coming up.    Hyperlipidemia for which he is on atorvastatin, OA of both knees including knee   replacement of the right knee, rotator cuff surgery and obstructive sleep   apnea, continuing to use CPAP.    REVIEW OF SYSTEMS:  He says he is feeling well.  No chest pain, no shortness of   breath, no palpitations, no nausea or vomiting, no PND or orthopnea since this   hospitalization.  He felt pretty well.  He is no longer having to use tramadol   for his back pain, so he does not need a narcotic contract.    PHYSICAL EXAMINATION:  GENERAL:  He is well-appearing 68-year-old gentleman in no acute distress.  He   has his wife and his two grandsons with him today.  This individual has the word   "Roll" written on " "his left calf or tattooed on his left calf and unfortunately   has the word "Tied" written or tattooed on his right calf.  NECK:  Supple.  CHEST:  Clear without wheeze.  CARDIOVASCULAR:  S1 and S2, regular rate and rhythm without any kind of murmur,   gallop or rub.  ABDOMEN:  Soft, nontender, no hepatosplenomegaly.  He does not look jaundiced.    No difficulty getting on and off the table.    ASSESSMENT AND PLAN:  1.  Diabetes mellitus type 2.  He has been on metformin.  We discussed this if   he has any kind of kidney injury he needs to avoid this because creatinine does   get above 1.4, but he is to avoid nonsteroidals,   hydrated.  He does have CKD   and anxiety, which he says has been doing pretty well  2.  Hyperlipidemia, nausea, vomiting, diarrhea has resolved.  He is status post   bilateral knee replacements.  I will see him back again in six months.  Continue   current medications and stay hydrated.  He is off the tramadol so he is no   longer needing pain contract with three-month followup with current time.        JOSE DE JESUS/IN  dd: 06/27/2018 22:18:43 (CDT)  td: 06/28/2018 18:19:24 (CDT)  Doc ID   #[id number]  Job ID #[id number]    CC:       "

## 2018-08-14 RX ORDER — ALPRAZOLAM 0.5 MG/1
TABLET ORAL
Qty: 15 TABLET | Refills: 2 | Status: CANCELLED | OUTPATIENT
Start: 2018-08-14

## 2018-08-20 NOTE — TELEPHONE ENCOUNTER
----- Message from Tami Nuno sent at 8/20/2018  3:50 PM CDT -----  Contact: [phone]  Patient is checking the status of medication ALPRAZolam (XANAX) 0.5 MG tablet  Refill.    Please advise, thank you.

## 2018-08-22 RX ORDER — ALPRAZOLAM 0.5 MG/1
TABLET ORAL
Qty: 15 TABLET | Refills: 2 | Status: SHIPPED | OUTPATIENT
Start: 2018-08-22 | End: 2018-11-30 | Stop reason: SDUPTHER

## 2018-08-22 RX ORDER — ALPRAZOLAM 0.5 MG/1
TABLET ORAL
Qty: 15 TABLET | Refills: 2 | OUTPATIENT
Start: 2018-08-22

## 2018-08-30 RX ORDER — ALLOPURINOL 100 MG/1
TABLET ORAL
Qty: 30 TABLET | Refills: 12 | Status: SHIPPED | OUTPATIENT
Start: 2018-08-30 | End: 2019-09-04 | Stop reason: SDUPTHER

## 2018-11-02 ENCOUNTER — TELEPHONE (OUTPATIENT)
Dept: INTERNAL MEDICINE | Facility: CLINIC | Age: 68
End: 2018-11-02

## 2018-11-02 NOTE — TELEPHONE ENCOUNTER
----- Message from Marilyn Larsen sent at 11/2/2018  3:27 PM CDT -----  Contact: Levon/ Shruthi [phone]  Type: Orders Request    What orders/ testing are being requested? EP 6 month labs    Is there a future appointment scheduled for the patient with PCP? Yes    When? 12/12/18

## 2018-11-08 RX ORDER — ALPRAZOLAM 0.5 MG/1
TABLET ORAL
Qty: 15 TABLET | Refills: 2 | OUTPATIENT
Start: 2018-11-08

## 2018-12-02 RX ORDER — ALPRAZOLAM 0.5 MG/1
TABLET ORAL
Qty: 15 TABLET | Refills: 2 | Status: SHIPPED | OUTPATIENT
Start: 2018-12-02 | End: 2019-03-11 | Stop reason: SDUPTHER

## 2018-12-05 ENCOUNTER — LAB VISIT (OUTPATIENT)
Dept: LAB | Facility: HOSPITAL | Age: 68
End: 2018-12-05
Attending: INTERNAL MEDICINE
Payer: MEDICARE

## 2018-12-05 DIAGNOSIS — E78.5 HYPERLIPIDEMIA, UNSPECIFIED HYPERLIPIDEMIA TYPE: ICD-10-CM

## 2018-12-05 DIAGNOSIS — E11.9 TYPE 2 DIABETES MELLITUS WITHOUT COMPLICATION, WITHOUT LONG-TERM CURRENT USE OF INSULIN: ICD-10-CM

## 2018-12-05 DIAGNOSIS — N18.30 CKD (CHRONIC KIDNEY DISEASE) STAGE 3, GFR 30-59 ML/MIN: ICD-10-CM

## 2018-12-05 LAB
ALBUMIN SERPL BCP-MCNC: 3.9 G/DL
ALP SERPL-CCNC: 98 U/L
ALT SERPL W/O P-5'-P-CCNC: 36 U/L
ANION GAP SERPL CALC-SCNC: 7 MMOL/L
AST SERPL-CCNC: 36 U/L
BILIRUB SERPL-MCNC: 0.8 MG/DL
BUN SERPL-MCNC: 20 MG/DL
CALCIUM SERPL-MCNC: 10 MG/DL
CHLORIDE SERPL-SCNC: 105 MMOL/L
CHOLEST SERPL-MCNC: 133 MG/DL
CHOLEST/HDLC SERPL: 2.7 {RATIO}
CO2 SERPL-SCNC: 26 MMOL/L
CREAT SERPL-MCNC: 1.1 MG/DL
EST. GFR  (AFRICAN AMERICAN): >60 ML/MIN/1.73 M^2
EST. GFR  (NON AFRICAN AMERICAN): >60 ML/MIN/1.73 M^2
ESTIMATED AVG GLUCOSE: 171 MG/DL
GLUCOSE SERPL-MCNC: 147 MG/DL
HBA1C MFR BLD HPLC: 7.6 %
HDLC SERPL-MCNC: 49 MG/DL
HDLC SERPL: 36.8 %
LDLC SERPL CALC-MCNC: 59.4 MG/DL
NONHDLC SERPL-MCNC: 84 MG/DL
POTASSIUM SERPL-SCNC: 4.7 MMOL/L
PROT SERPL-MCNC: 7.7 G/DL
SODIUM SERPL-SCNC: 138 MMOL/L
TRIGL SERPL-MCNC: 123 MG/DL

## 2018-12-05 PROCEDURE — 36415 COLL VENOUS BLD VENIPUNCTURE: CPT

## 2018-12-05 PROCEDURE — 80061 LIPID PANEL: CPT

## 2018-12-05 PROCEDURE — 80053 COMPREHEN METABOLIC PANEL: CPT

## 2018-12-05 PROCEDURE — 83036 HEMOGLOBIN GLYCOSYLATED A1C: CPT

## 2018-12-12 ENCOUNTER — OFFICE VISIT (OUTPATIENT)
Dept: INTERNAL MEDICINE | Facility: CLINIC | Age: 68
End: 2018-12-12
Payer: MEDICARE

## 2018-12-12 VITALS
BODY MASS INDEX: 30.85 KG/M2 | HEIGHT: 72 IN | SYSTOLIC BLOOD PRESSURE: 132 MMHG | WEIGHT: 227.75 LBS | OXYGEN SATURATION: 97 % | HEART RATE: 75 BPM | DIASTOLIC BLOOD PRESSURE: 60 MMHG

## 2018-12-12 DIAGNOSIS — R51.9 CHRONIC NONINTRACTABLE HEADACHE, UNSPECIFIED HEADACHE TYPE: ICD-10-CM

## 2018-12-12 DIAGNOSIS — F32.A DEPRESSION, UNSPECIFIED DEPRESSION TYPE: ICD-10-CM

## 2018-12-12 DIAGNOSIS — Z12.5 SCREENING PSA (PROSTATE SPECIFIC ANTIGEN): ICD-10-CM

## 2018-12-12 DIAGNOSIS — E78.5 HYPERLIPIDEMIA, UNSPECIFIED HYPERLIPIDEMIA TYPE: ICD-10-CM

## 2018-12-12 DIAGNOSIS — Z96.653 STATUS POST TOTAL BILATERAL KNEE REPLACEMENT: ICD-10-CM

## 2018-12-12 DIAGNOSIS — F41.9 ANXIETY: Primary | ICD-10-CM

## 2018-12-12 DIAGNOSIS — G89.29 CHRONIC NONINTRACTABLE HEADACHE, UNSPECIFIED HEADACHE TYPE: ICD-10-CM

## 2018-12-12 DIAGNOSIS — E11.9 TYPE 2 DIABETES MELLITUS WITHOUT COMPLICATION, WITHOUT LONG-TERM CURRENT USE OF INSULIN: ICD-10-CM

## 2018-12-12 PROCEDURE — 99214 OFFICE O/P EST MOD 30 MIN: CPT | Mod: S$PBB,,, | Performed by: INTERNAL MEDICINE

## 2018-12-12 PROCEDURE — 99999 PR PBB SHADOW E&M-EST. PATIENT-LVL V: CPT | Mod: PBBFAC,,, | Performed by: INTERNAL MEDICINE

## 2018-12-12 PROCEDURE — 99215 OFFICE O/P EST HI 40 MIN: CPT | Mod: PBBFAC | Performed by: INTERNAL MEDICINE

## 2018-12-12 NOTE — PROGRESS NOTES
HISTORY OF PRESENT ILLNESS: Mr. Hatch is a 68-year-old gentleman coming in    today for followup of multiple medical problems today.          1. This is a 68-year-old gentleman with diabetes mellitus type 2. He has had DM for 20 years.  He is on   Amaryl 4 mg in the morning, metformin, and Januvia. . His hemoglobin A1c 7.6  and recent glucose is 147 . He has been moving around more. He is able to work, but not exercising very much. No low Glucoses at home. He is having some nocturnal burning in feet.            2. He has chronic kidney disease, status post acute kidney injury back in    December 2013. Creatinine had gone up to 2.0 to 2.3. He had renal stones and    had lithotripsy last year. Most recent creatinine is 1.1, putting him at    stage II chronic kidney disease. He is finialy off NSAIDS.           3. He has hyperlipidemia, which he has had for several years. He is on    Lipitor. His recent cholesterol is 133, HDL 49, LDL is 59.4 , and triglycerides    are 123.       4. He has OA of both knees, s/p right  knee replacement. Since last visit he had a left knee replacement.    Gong to try to see Ortho in [place].       5. Torn rotator cups- He is going to see MD in [facility] for his shoulder surgery.      6. ENZO- using CPAP every night and is doing well. Feels well rested.        He has had several skin cancers removed.  Follows up regularly with his dermatologist.                ROS : Gen - no fatigue or significant weight change  Eyes - no eye pain or visual changes  ENT - no hoarseness or sore throat  CV - No chest pain or SOB. NO palpitations.  Pulm - no cough or wheezing  GI - no N/V/D   no dysuria or incontinence  MS - no joint pain or muscle pain  Skin - no rash, or c/o of skin lesions  Neuro -  dizziness--- memory is doing well. -- he has been having HA 's in the mornings for the past 2 months- formntal , right side. NO N/V .  He awakes with the HA.  It last 3-4 hours.  He denies any trauma--  happen about 3 times a week.  He has been using his cpap regularly.    Heme - no abnormal bleeding or bruising  Endo - no polydipsia, or temperature changes  Psych - no anxiety or depression          PHYSICAL EXAMINATION:       /60   Pulse 75   Ht 6' (1.829 m)   Wt 103.3 kg (227 lb 11.8 oz)   SpO2 97%   BMI 30.89 kg/m²     GENERAL: He is a well-appearing 67-year-old gentleman in no acute distress.  HEENT: Ear canals are open. TMs are clear. Oropharynx is clear.  NECK: Supple. He has no JVD. Thyroid is not enlarged.  CARDIOVASCULAR: S1 and S2, regular rate and rhythm without murmur, gallop, or    Rub.  LUNGS: Clear bilaterally  ABDOMEN: Soft, nontender, no hepatosplenomegaly, no guarding or rebound    tenderness.  LOWER EXTREMITIES: No edema. Missing the middle finger on his left hand.                ASSESSMENT:  1. Diabetes mellitus type 2: Januvia, Amaryl 4mg bid, metformin 1000mg Bid. We discussed appropriate diet and exercise. Discussed hypoglycemia.  He is drinking a lot of sweet tea and not exercising. He wishes to try lifestyle modification first.  Work on diet and discussed exercise.       2. Anxiety-- He is cutting  down to using 1/2 xanax every morning .    stay on cymbalta. .  3. He has obstructive sleep apnea for which he uses CPAP. He was using it every night- he wake up well refreshed.    4. ED-- he has tried Viagra- cialias.    5. Back pain- seeing Chiropractor for this. Taking tramadol twice daily-- off NSAID due to h/o DEE.   reviewed-- drug  reviewed.  Questionnaire done-- pain contract signes last visit.   6. Peripheral neuropathy.  -Had to come off   elavil   due to contraindication with tramadol.  He is on this due to his oa of the knees and shoulder pains.  He had ARF in 2013 on NSAIDs.    7. HA- will get CT of his head to rule out pathology

## 2018-12-20 ENCOUNTER — HOSPITAL ENCOUNTER (OUTPATIENT)
Dept: RADIOLOGY | Facility: HOSPITAL | Age: 68
Discharge: HOME | End: 2018-12-20
Attending: INTERNAL MEDICINE
Payer: MEDICARE

## 2018-12-20 DIAGNOSIS — G89.29 CHRONIC NONINTRACTABLE HEADACHE, UNSPECIFIED HEADACHE TYPE: ICD-10-CM

## 2018-12-20 DIAGNOSIS — R51.9 CHRONIC NONINTRACTABLE HEADACHE, UNSPECIFIED HEADACHE TYPE: ICD-10-CM

## 2018-12-20 PROCEDURE — 70450 CT HEAD/BRAIN W/O DYE: CPT | Mod: 26,,, | Performed by: RADIOLOGY

## 2018-12-20 PROCEDURE — 70450 CT HEAD/BRAIN W/O DYE: CPT | Mod: TC

## 2019-01-02 ENCOUNTER — TELEPHONE (OUTPATIENT)
Dept: INTERNAL MEDICINE | Facility: CLINIC | Age: 69
End: 2019-01-02

## 2019-01-02 NOTE — TELEPHONE ENCOUNTER
----- Message from Ashely Cano sent at 1/2/2019 10:58 AM CST -----  Contact: wife/jaqui/[phone]  Pt wife called in regards to getting his results that was done on dec 20.      Please advise

## 2019-01-03 ENCOUNTER — TELEPHONE (OUTPATIENT)
Dept: INTERNAL MEDICINE | Facility: CLINIC | Age: 69
End: 2019-01-03

## 2019-01-04 NOTE — TELEPHONE ENCOUNTER
Please call-- CT of the head looks ok.  He does have some right sinus congestion.   Is he having any pain under right eye?

## 2019-01-04 NOTE — TELEPHONE ENCOUNTER
Informed pt on CT results.       Asked pt did he have any pain under right eye he says yes every now and then but nothing to much to complain about.

## 2019-02-07 DIAGNOSIS — E11.9 TYPE 2 DIABETES MELLITUS WITHOUT COMPLICATION: ICD-10-CM

## 2019-03-12 RX ORDER — ALPRAZOLAM 0.5 MG/1
TABLET ORAL
Qty: 15 TABLET | Refills: 2 | Status: SHIPPED | OUTPATIENT
Start: 2019-03-12 | End: 2019-05-11 | Stop reason: SDUPTHER

## 2019-05-06 NOTE — TELEPHONE ENCOUNTER
----- Message from Vesta Varma sent at 5/6/2019  1:11 PM CDT -----  Prescription Request:     Name of medication: Januvia tab 100 mg    Reason for request: Refill    Pharmacy: [facility] - [place], [place] - [address] AT [facility] [phone] (Phone) [phone] (Fax)    Please advise.    Thank You

## 2019-05-13 ENCOUNTER — TELEPHONE (OUTPATIENT)
Dept: INTERNAL MEDICINE | Facility: CLINIC | Age: 69
End: 2019-05-13

## 2019-05-13 NOTE — TELEPHONE ENCOUNTER
----- Message from Ramona Servin sent at 5/13/2019 12:45 PM CDT -----  Type: Needs Medical Advice    Who Called:  Filomena (wife)  Best Call Back Number: [phone]  Additional Information: Wife calling to find out how soon patient should have lab work done before appointment/please call back to advise. (okay to leave message)

## 2019-05-17 RX ORDER — GLIMEPIRIDE 4 MG/1
TABLET ORAL
Qty: 60 TABLET | Refills: 9 | Status: SHIPPED | OUTPATIENT
Start: 2019-05-17 | End: 2019-07-04

## 2019-05-20 RX ORDER — ALPRAZOLAM 0.5 MG/1
TABLET ORAL
Qty: 15 TABLET | Refills: 2 | Status: SHIPPED | OUTPATIENT
Start: 2019-05-20 | End: 2019-09-04 | Stop reason: SDUPTHER

## 2019-06-26 ENCOUNTER — LAB VISIT (OUTPATIENT)
Dept: LAB | Facility: HOSPITAL | Age: 69
End: 2019-06-26
Attending: INTERNAL MEDICINE
Payer: MEDICARE

## 2019-06-26 DIAGNOSIS — E11.9 TYPE 2 DIABETES MELLITUS WITHOUT COMPLICATION, WITHOUT LONG-TERM CURRENT USE OF INSULIN: ICD-10-CM

## 2019-06-26 LAB
ALBUMIN SERPL BCP-MCNC: 4 G/DL (ref 3.5–5.2)
ALP SERPL-CCNC: 98 U/L (ref 55–135)
ALT SERPL W/O P-5'-P-CCNC: 32 U/L (ref 10–44)
ANION GAP SERPL CALC-SCNC: 10 MMOL/L (ref 8–16)
AST SERPL-CCNC: 30 U/L (ref 10–40)
BILIRUB SERPL-MCNC: 0.6 MG/DL (ref 0.1–1)
BUN SERPL-MCNC: 29 MG/DL (ref 8–23)
CALCIUM SERPL-MCNC: 9.4 MG/DL (ref 8.7–10.5)
CHLORIDE SERPL-SCNC: 106 MMOL/L (ref 95–110)
CHOLEST SERPL-MCNC: 134 MG/DL (ref 120–199)
CHOLEST/HDLC SERPL: 3.2 {RATIO} (ref 2–5)
CO2 SERPL-SCNC: 24 MMOL/L (ref 23–29)
CREAT SERPL-MCNC: 1.3 MG/DL (ref 0.5–1.4)
EST. GFR  (AFRICAN AMERICAN): >60 ML/MIN/1.73 M^2
EST. GFR  (NON AFRICAN AMERICAN): 56 ML/MIN/1.73 M^2
ESTIMATED AVG GLUCOSE: 177 MG/DL (ref 68–131)
GLUCOSE SERPL-MCNC: 149 MG/DL (ref 70–110)
HBA1C MFR BLD HPLC: 7.8 % (ref 4–5.6)
HDLC SERPL-MCNC: 42 MG/DL (ref 40–75)
HDLC SERPL: 31.3 % (ref 20–50)
LDLC SERPL CALC-MCNC: 65.2 MG/DL (ref 63–159)
NONHDLC SERPL-MCNC: 92 MG/DL
POTASSIUM SERPL-SCNC: 4.3 MMOL/L (ref 3.5–5.1)
PROT SERPL-MCNC: 7.5 G/DL (ref 6–8.4)
SODIUM SERPL-SCNC: 140 MMOL/L (ref 136–145)
TRIGL SERPL-MCNC: 134 MG/DL (ref 30–150)

## 2019-06-26 PROCEDURE — 83036 HEMOGLOBIN GLYCOSYLATED A1C: CPT

## 2019-06-26 PROCEDURE — 36415 COLL VENOUS BLD VENIPUNCTURE: CPT

## 2019-06-26 PROCEDURE — 80053 COMPREHEN METABOLIC PANEL: CPT

## 2019-06-26 PROCEDURE — 80061 LIPID PANEL: CPT

## 2019-07-04 RX ORDER — DULOXETIN HYDROCHLORIDE 60 MG/1
CAPSULE, DELAYED RELEASE ORAL
Qty: 30 CAPSULE | Refills: 11 | Status: SHIPPED | OUTPATIENT
Start: 2019-07-04 | End: 2020-01-03

## 2019-07-04 RX ORDER — ATORVASTATIN CALCIUM 40 MG/1
TABLET, FILM COATED ORAL
Qty: 30 TABLET | Refills: 11 | Status: SHIPPED | OUTPATIENT
Start: 2019-07-04 | End: 2020-01-03

## 2019-07-04 RX ORDER — SITAGLIPTIN 100 MG/1
TABLET, FILM COATED ORAL
Qty: 90 TABLET | Refills: 3 | Status: SHIPPED | OUTPATIENT
Start: 2019-07-04 | End: 2020-01-03 | Stop reason: SDUPTHER

## 2019-07-04 RX ORDER — GLIMEPIRIDE 4 MG/1
TABLET ORAL
Qty: 60 TABLET | Refills: 9 | Status: SHIPPED | OUTPATIENT
Start: 2019-07-04 | End: 2020-01-03

## 2019-07-04 RX ORDER — METFORMIN HYDROCHLORIDE 500 MG/1
TABLET ORAL
Qty: 360 TABLET | Refills: 3 | Status: SHIPPED | OUTPATIENT
Start: 2019-07-04 | End: 2020-04-13

## 2019-07-04 RX ORDER — TAMSULOSIN HYDROCHLORIDE 0.4 MG/1
CAPSULE ORAL
Qty: 30 CAPSULE | Refills: 12 | Status: SHIPPED | OUTPATIENT
Start: 2019-07-04 | End: 2020-07-31

## 2019-07-05 ENCOUNTER — OFFICE VISIT (OUTPATIENT)
Dept: INTERNAL MEDICINE | Facility: CLINIC | Age: 69
End: 2019-07-05
Payer: MEDICARE

## 2019-07-05 VITALS
WEIGHT: 226.19 LBS | BODY MASS INDEX: 30.64 KG/M2 | HEART RATE: 80 BPM | SYSTOLIC BLOOD PRESSURE: 160 MMHG | OXYGEN SATURATION: 95 % | DIASTOLIC BLOOD PRESSURE: 80 MMHG | HEIGHT: 72 IN

## 2019-07-05 DIAGNOSIS — I10 HYPERTENSION, UNSPECIFIED TYPE: ICD-10-CM

## 2019-07-05 DIAGNOSIS — Z96.653 STATUS POST TOTAL BILATERAL KNEE REPLACEMENT: ICD-10-CM

## 2019-07-05 DIAGNOSIS — E78.5 HYPERLIPIDEMIA, UNSPECIFIED HYPERLIPIDEMIA TYPE: ICD-10-CM

## 2019-07-05 DIAGNOSIS — F32.A DEPRESSION, UNSPECIFIED DEPRESSION TYPE: ICD-10-CM

## 2019-07-05 DIAGNOSIS — Z12.11 ENCOUNTER FOR SCREENING COLONOSCOPY: ICD-10-CM

## 2019-07-05 DIAGNOSIS — E11.9 TYPE 2 DIABETES MELLITUS WITHOUT COMPLICATION, WITHOUT LONG-TERM CURRENT USE OF INSULIN: ICD-10-CM

## 2019-07-05 DIAGNOSIS — N18.30 CKD (CHRONIC KIDNEY DISEASE) STAGE 3, GFR 30-59 ML/MIN: Primary | ICD-10-CM

## 2019-07-05 PROCEDURE — 99999 PR PBB SHADOW E&M-EST. PATIENT-LVL IV: ICD-10-PCS | Mod: PBBFAC,,, | Performed by: INTERNAL MEDICINE

## 2019-07-05 PROCEDURE — 99999 PR PBB SHADOW E&M-EST. PATIENT-LVL IV: CPT | Mod: PBBFAC,,, | Performed by: INTERNAL MEDICINE

## 2019-07-05 PROCEDURE — 99214 OFFICE O/P EST MOD 30 MIN: CPT | Mod: PBBFAC | Performed by: INTERNAL MEDICINE

## 2019-07-05 PROCEDURE — 99214 OFFICE O/P EST MOD 30 MIN: CPT | Mod: S$PBB,,, | Performed by: INTERNAL MEDICINE

## 2019-07-05 PROCEDURE — 99214 PR OFFICE/OUTPT VISIT, EST, LEVL IV, 30-39 MIN: ICD-10-PCS | Mod: S$PBB,,, | Performed by: INTERNAL MEDICINE

## 2019-07-05 RX ORDER — AMLODIPINE BESYLATE 5 MG/1
5 TABLET ORAL DAILY
Qty: 90 TABLET | Refills: 2 | Status: SHIPPED | OUTPATIENT
Start: 2019-07-05 | End: 2020-01-03

## 2019-07-05 NOTE — PROGRESS NOTES
"   HISTORY OF PRESENT ILLNESS: Mr. Hatch is a 68-year-old gentleman coming in    today for followup of multiple medical problems today.          1. This is a 69 -year-old gentleman with diabetes mellitus type 2. He has had DM for 20 years.  He is on   Amaryl 4 mg in the morning, metformin, and Januvia. . His hemoglobin A1c 7.8  and recent glucose is 149 . He has been moving around more. He is able to work, but not exercising very much. No low Glucoses at home. He is having some nocturnal burning in feet.  He has lost 1 # since last visit.            2. He has chronic kidney disease, status post acute kidney injury back in    December 2013. Creatinine had gone up to 2.0 to 2.3. He had renal stones and    had lithotripsy last year. Most recent creatinine is 1.3, putting him at    stage II chronic kidney disease. He is finialy off NSAIDS.           3. He has hyperlipidemia, which he has had for several years. He is on    Lipitor. His recent cholesterol is 134, HDL 42, LDL is 65.2 , and triglycerides    are 134.       4. He has OA of both knees, s/p right  knee replacement. Since last visit he had a left knee replacement.    Gong to try to see Ortho in [place].       5. Torn rotator cups- He is going to see MD in [facility] for his shoulder surgery.      6. ENZO- using CPAP every night and is doing well. Feels well rested.      7. htn -- bp is not controled to day-- he wife blames him "racing a mustang" on the way here. NO HA or CP>           He has had several skin cancers removed.  Follows up regularly with his dermatologist.                ROS : Gen - no fatigue or significant weight change  Eyes - no eye pain or visual changes  ENT - no hoarseness or sore throat  CV - No chest pain or SOB. NO palpitations.  Pulm - no cough or wheezing  GI - no N/V/D   no dysuria or incontinence  MS - no joint pain or muscle pain  Skin - no rash, or c/o of skin lesions  Neuro -  dizziness--- memory is doing well. -- he has been " having HA 's in the mornings for the past 2 months- formntal , right side. NO N/V .  He awakes with the HA.  It last 3-4 hours.  He denies any trauma-- happen about 3 times a week.  He has been using his cpap regularly.    Heme - no abnormal bleeding or bruising  Endo - no polydipsia, or temperature changes  Psych - no anxiety or depression          PHYSICAL EXAMINATION:      BP (!) 160/80   Pulse 80   Ht 6' (1.829 m)   Wt 102.6 kg (226 lb 3.1 oz)   SpO2 95%   BMI 30.68 kg/m²          GENERAL: He is a well-appearing 67-year-old gentleman in no acute distress.  HEENT: Ear canals are open. TMs are clear. Oropharynx is clear.  NECK: Supple. He has no JVD. Thyroid is not enlarged.  CARDIOVASCULAR: S1 and S2, regular rate and rhythm without murmur, gallop, or    Rub.  LUNGS: Clear bilaterally  ABDOMEN: Soft, nontender, no hepatosplenomegaly, no guarding or rebound    tenderness.  LOWER EXTREMITIES: No edema. Missing the middle finger on his left hand.     Protective Sensation (w/ 10 gram monofilament):  Right: Intact  Left: Decreased    Visual Inspection:  Normal -  Bilateral    Pedal Pulses:   Right: Diminshed  Left: Diminshed    Posterior tibialis:   Right:Present  Left: Present                ASSESSMENT:  1. Diabetes mellitus type 2: Januvia, Amaryl 4mg bid, metformin 1000mg Bid. We discussed appropriate diet and exercise. Discussed hypoglycemia.  He is drinking a lot of sweet tea and not exercising. He wishes to try lifestyle modification first.  Work on diet and discussed exercise.    Will try trulicity id not better next time      2. Anxiety-- He is cutting  down to using 1/2 xanax every morning .    stay on cymbalta. .  3. He has obstructive sleep apnea for which he uses CPAP. He was using it every night- he wake up well refreshed.    4. ED-- he has tried Viagra- cialias.    5. Back pain- seeing Chiropractor for this. Taking tramadol twice daily-- off NSAID due to h/o DEE.   reviewed-- drug  reviewed.   Questionnaire done-- pain contract signes last visit.   6. Peripheral neuropathy.  -Had to come off   elavil  == He is on this due to his oa of the knees and shoulder pains.  He had ARF in 2013 on NSAIDs.    7. HTN- will start amlodipine 5 mg a day

## 2019-07-10 RX ORDER — GABAPENTIN 300 MG/1
CAPSULE ORAL
Qty: 180 CAPSULE | Refills: 11 | Status: SHIPPED | OUTPATIENT
Start: 2019-07-10 | End: 2020-01-03 | Stop reason: SDUPTHER

## 2019-07-18 ENCOUNTER — TELEPHONE (OUTPATIENT)
Dept: INTERNAL MEDICINE | Facility: CLINIC | Age: 69
End: 2019-07-18

## 2019-07-18 NOTE — TELEPHONE ENCOUNTER
----- Message from Elisha Bullock sent at 7/18/2019 12:10 PM CDT -----  Contact: Spouse [phone]  Spouse is requesting callback to discuss change in schedule of colonoscopy for patient.    Please call and advise  Thank you

## 2019-09-04 ENCOUNTER — OFFICE VISIT (OUTPATIENT)
Dept: INTERNAL MEDICINE | Facility: CLINIC | Age: 69
End: 2019-09-04
Payer: MEDICARE

## 2019-09-04 ENCOUNTER — LAB VISIT (OUTPATIENT)
Dept: LAB | Facility: HOSPITAL | Age: 69
End: 2019-09-04
Attending: INTERNAL MEDICINE
Payer: MEDICARE

## 2019-09-04 VITALS
BODY MASS INDEX: 30.01 KG/M2 | HEART RATE: 72 BPM | SYSTOLIC BLOOD PRESSURE: 136 MMHG | HEIGHT: 72 IN | WEIGHT: 221.56 LBS | DIASTOLIC BLOOD PRESSURE: 78 MMHG | OXYGEN SATURATION: 98 %

## 2019-09-04 DIAGNOSIS — E11.9 TYPE 2 DIABETES MELLITUS WITHOUT COMPLICATION, WITHOUT LONG-TERM CURRENT USE OF INSULIN: ICD-10-CM

## 2019-09-04 DIAGNOSIS — F41.9 ANXIETY: Primary | ICD-10-CM

## 2019-09-04 DIAGNOSIS — N18.30 CKD (CHRONIC KIDNEY DISEASE) STAGE 3, GFR 30-59 ML/MIN: ICD-10-CM

## 2019-09-04 DIAGNOSIS — E78.5 HYPERLIPIDEMIA, UNSPECIFIED HYPERLIPIDEMIA TYPE: ICD-10-CM

## 2019-09-04 LAB
ANION GAP SERPL CALC-SCNC: 7 MMOL/L (ref 8–16)
BUN SERPL-MCNC: 24 MG/DL (ref 8–23)
CALCIUM SERPL-MCNC: 9.3 MG/DL (ref 8.7–10.5)
CHLORIDE SERPL-SCNC: 105 MMOL/L (ref 95–110)
CO2 SERPL-SCNC: 25 MMOL/L (ref 23–29)
CREAT SERPL-MCNC: 1.1 MG/DL (ref 0.5–1.4)
EST. GFR  (AFRICAN AMERICAN): >60 ML/MIN/1.73 M^2
EST. GFR  (NON AFRICAN AMERICAN): >60 ML/MIN/1.73 M^2
ESTIMATED AVG GLUCOSE: 169 MG/DL (ref 68–131)
GLUCOSE SERPL-MCNC: 145 MG/DL (ref 70–110)
HBA1C MFR BLD HPLC: 7.5 % (ref 4–5.6)
POTASSIUM SERPL-SCNC: 4.3 MMOL/L (ref 3.5–5.1)
SODIUM SERPL-SCNC: 137 MMOL/L (ref 136–145)

## 2019-09-04 PROCEDURE — 80048 BASIC METABOLIC PNL TOTAL CA: CPT

## 2019-09-04 PROCEDURE — 99999 PR PBB SHADOW E&M-EST. PATIENT-LVL III: CPT | Mod: PBBFAC,,, | Performed by: INTERNAL MEDICINE

## 2019-09-04 PROCEDURE — 36415 COLL VENOUS BLD VENIPUNCTURE: CPT

## 2019-09-04 PROCEDURE — 99214 PR OFFICE/OUTPT VISIT, EST, LEVL IV, 30-39 MIN: ICD-10-PCS | Mod: S$PBB,,, | Performed by: INTERNAL MEDICINE

## 2019-09-04 PROCEDURE — 99213 OFFICE O/P EST LOW 20 MIN: CPT | Mod: PBBFAC | Performed by: INTERNAL MEDICINE

## 2019-09-04 PROCEDURE — 83036 HEMOGLOBIN GLYCOSYLATED A1C: CPT

## 2019-09-04 PROCEDURE — 99999 PR PBB SHADOW E&M-EST. PATIENT-LVL III: ICD-10-PCS | Mod: PBBFAC,,, | Performed by: INTERNAL MEDICINE

## 2019-09-04 PROCEDURE — 99214 OFFICE O/P EST MOD 30 MIN: CPT | Mod: S$PBB,,, | Performed by: INTERNAL MEDICINE

## 2019-09-04 RX ORDER — PANTOPRAZOLE SODIUM 40 MG/1
TABLET, DELAYED RELEASE ORAL
Qty: 90 TABLET | Refills: 3 | Status: SHIPPED | OUTPATIENT
Start: 2019-09-04 | End: 2020-01-03 | Stop reason: SDUPTHER

## 2019-09-04 RX ORDER — ALPRAZOLAM 0.5 MG/1
TABLET ORAL
Qty: 15 TABLET | Refills: 2 | Status: SHIPPED | OUTPATIENT
Start: 2019-09-04 | End: 2019-12-17 | Stop reason: SDUPTHER

## 2019-09-04 RX ORDER — ALLOPURINOL 100 MG/1
TABLET ORAL
Qty: 90 TABLET | Refills: 3 | Status: SHIPPED | OUTPATIENT
Start: 2019-09-04 | End: 2020-01-03 | Stop reason: SDUPTHER

## 2019-09-04 NOTE — PROGRESS NOTES
HISTORY OF PRESENT ILLNESS: Mr. Hatch is a 69-year-old gentleman coming in    today for followup of multiple medical problems today.          1. This is a 69 -year-old gentleman with diabetes mellitus type 2. He has had DM for 20 years.  He is on   Amaryl 4 mg in the morning, metformin, and Januvia. . His hemoglobin A1c 7.8  and recent glucose is 149 (6/2019)  . He has been moving around more. He is able to work, but not exercising very much. No low Glucoses at home. He is having some nocturnal burning in feet.  He has lost 5 # since last visit.            2. He has chronic kidney disease, status post acute kidney injury back in    December 2013. Creatinine had gone up to 2.0 to 2.3. He had renal stones and    had lithotripsy last year. Most recent creatinine is 1.3, putting him at    stage II chronic kidney disease. He is finialy off NSAIDS.           3. He has hyperlipidemia, which he has had for several years. He is on    Lipitor. His recent cholesterol is 134, HDL 42, LDL is 65.2 , and triglycerides    are 134.       4. He has OA of both knees, s/p right  knee replacement. Since last visit he had a left knee replacement.    Gong to try to see Ortho in [place].       5. Torn rotator cups- He is going to see MD in [facility] for his shoulder surgery.      6. ENZO- using CPAP every night and is doing well. Feels well rested.       7. htn -- bp is  controled to day--  Last visit we started him on amlodipine 5mg a day.          He has had several skin cancers removed.  Follows up regularly with his dermatologist.                ROS : Gen - no fatigue or significant weight change  Eyes - no eye pain or visual changes  ENT - no hoarseness or sore throat  CV - No chest pain or SOB. NO palpitations.  Pulm - no cough or wheezing  GI - no N/V/D   no dysuria or incontinence  MS - no joint pain or muscle pain  Skin - no rash, or c/o of skin lesions  Neuro -  dizziness--- memory is doing well. -- he has been having HA 's in  the mornings for the past 2 months- formntal , right side. NO N/V .  He awakes with the HA.  It last 3-4 hours.  He denies any trauma-- happen about 3 times a week.  He has been using his cpap regularly.    Heme - no abnormal bleeding or bruising  Endo - no polydipsia, or temperature changes  Psych - no anxiety or depression          PHYSICAL EXAMINATION:      /78 (BP Location: Right arm, Patient Position: Sitting, BP Method: Large (Manual))   Pulse 72   Ht 6' (1.829 m)   Wt 100.5 kg (221 lb 9 oz)   SpO2 98%   BMI 30.05 kg/m²        GENERAL: He is a well-appearing 69-year-old gentleman in no acute distress.  HEENT: Ear canals are open. TMs are clear. Oropharynx is clear.  NECK: Supple. He has no JVD. Thyroid is not enlarged.  CARDIOVASCULAR: S1 and S2, regular rate and rhythm without murmur, gallop, or    Rub.  LUNGS: Clear bilaterally  ABDOMEN: Soft, nontender, no hepatosplenomegaly, no guarding or rebound    tenderness.  LOWER EXTREMITIES: No edema. Missing the middle finger on his left hand.                      ASSESSMENT:  1. Diabetes mellitus type 2: Januvia, Amaryl 4mg bid, metformin 1000mg Bid. We discussed appropriate diet and exercise. Discussed hypoglycemia.  He is drinking a lot of sweet tea and not exercising. He wishes to try lifestyle modification first.  Work on diet and discussed exercise.    Will try trulicity if not better.   Will check labs today, and if not better or acceptable we will stop the Januvia and start the trulicity   2. Anxiety-- He is cutting  down to using 1/2 xanax every morning .    stay on cymbalta. .  3. He has obstructive sleep apnea for which he uses CPAP. He was using it every night- he wake up well refreshed.    4. ED-- he has tried Viagra- cialias.    5. Back pain- seeing Chiropractor for this. Taking tramadol twice daily-- off NSAID due to h/o DEE.   reviewed-- drug  reviewed.  Questionnaire done-- pain contract signes last visit.   6. Peripheral  neuropathy.  -Had to come off   elavil  == He is on this due to his oa of the knees and shoulder pains.  He had ARF in 2013 on NSAIDs.  Today asking for refill of his Mobic.  We took him off the Mobic because of acute renal failure and his chronic kidney disease. So we discussed this again.  We will check a BMP today  7. HTN- will continue amlodipine 5 mg a day

## 2019-10-22 DIAGNOSIS — Z12.11 COLON CANCER SCREENING: ICD-10-CM

## 2019-10-29 ENCOUNTER — TELEPHONE (OUTPATIENT)
Dept: INTERNAL MEDICINE | Facility: CLINIC | Age: 69
End: 2019-10-29

## 2019-10-29 DIAGNOSIS — N18.30 CKD (CHRONIC KIDNEY DISEASE) STAGE 3, GFR 30-59 ML/MIN: Primary | ICD-10-CM

## 2019-10-29 NOTE — TELEPHONE ENCOUNTER
----- Message from Tami Nuno sent at 10/29/2019  3:34 PM CDT -----  Contact: [phone]  Patient is requesting a call from the office regarding the medication morbic he was taken.    Patient stated now that he is off the medicine he can barely walk.    Please advise, thank you

## 2019-10-29 NOTE — TELEPHONE ENCOUNTER
Pt says he was recently taken off his Mobic and he can barley walk asking if you can put him back on it or something else.

## 2019-11-03 RX ORDER — MELOXICAM 7.5 MG/1
7.5 TABLET ORAL DAILY
Qty: 30 TABLET | Refills: 1 | Status: SHIPPED | OUTPATIENT
Start: 2019-11-03 | End: 2020-01-03

## 2019-11-03 NOTE — TELEPHONE ENCOUNTER
He had some renal problems on this.  We can try again, but will have to see him back in 2 months to recheck renal function-- rx sont in .  Bring him to see me with a bmp in 2 months

## 2019-11-04 NOTE — TELEPHONE ENCOUNTER
Spoke with pt and informed him on what  stated about renal function.  Scheduled pt 2 Month F/U with lab worl

## 2019-12-20 RX ORDER — ALPRAZOLAM 0.5 MG/1
TABLET ORAL
Qty: 15 TABLET | Refills: 2 | OUTPATIENT
Start: 2019-12-20

## 2019-12-20 RX ORDER — ALPRAZOLAM 0.5 MG/1
TABLET ORAL
Qty: 15 TABLET | Refills: 2 | Status: SHIPPED | OUTPATIENT
Start: 2019-12-20 | End: 2020-01-03 | Stop reason: SDUPTHER

## 2019-12-20 NOTE — TELEPHONE ENCOUNTER
----- Message from Ashely Kruse sent at 12/20/2019 10:10 AM CST -----  Contact: patient [phone] Shruthi   Patient is calling for an RX refill or new RX.  Is this a refill or new refill  RX name and strength:  ALPRAZolam (XANAX) 0.5 MG tabletRX:  refill  Directions (copy/paste from chart):  TAKE (1/2) TABLET NIGHTLY AT BEDTIME AS NEEDED  Is this a 30 day or 90 day RX:  30  Local pharmacy or mail order pharmacy:  local    Pharmacy name and phone # (copy/paste from chart):   [facility] - [place] [id number] [place] [address] [phone]    Comments:  Pharmacy has tried to request refill for patient and has had no response to two requests.

## 2019-12-31 ENCOUNTER — LAB VISIT (OUTPATIENT)
Dept: LAB | Facility: HOSPITAL | Age: 69
End: 2019-12-31
Attending: INTERNAL MEDICINE
Payer: MEDICARE

## 2019-12-31 DIAGNOSIS — N18.30 CKD (CHRONIC KIDNEY DISEASE) STAGE 3, GFR 30-59 ML/MIN: ICD-10-CM

## 2019-12-31 LAB
ANION GAP SERPL CALC-SCNC: 7 MMOL/L (ref 8–16)
BUN SERPL-MCNC: 25 MG/DL (ref 8–23)
CALCIUM SERPL-MCNC: 9.2 MG/DL (ref 8.7–10.5)
CHLORIDE SERPL-SCNC: 106 MMOL/L (ref 95–110)
CO2 SERPL-SCNC: 27 MMOL/L (ref 23–29)
CREAT SERPL-MCNC: 1.2 MG/DL (ref 0.5–1.4)
EST. GFR  (AFRICAN AMERICAN): >60 ML/MIN/1.73 M^2
EST. GFR  (NON AFRICAN AMERICAN): >60 ML/MIN/1.73 M^2
GLUCOSE SERPL-MCNC: 191 MG/DL (ref 70–110)
POTASSIUM SERPL-SCNC: 4.8 MMOL/L (ref 3.5–5.1)
SODIUM SERPL-SCNC: 140 MMOL/L (ref 136–145)

## 2019-12-31 PROCEDURE — 80048 BASIC METABOLIC PNL TOTAL CA: CPT

## 2019-12-31 PROCEDURE — 36415 COLL VENOUS BLD VENIPUNCTURE: CPT

## 2020-01-03 ENCOUNTER — OFFICE VISIT (OUTPATIENT)
Dept: INTERNAL MEDICINE | Facility: CLINIC | Age: 70
End: 2020-01-03
Payer: MEDICARE

## 2020-01-03 VITALS
HEIGHT: 72 IN | DIASTOLIC BLOOD PRESSURE: 80 MMHG | HEART RATE: 76 BPM | SYSTOLIC BLOOD PRESSURE: 135 MMHG | OXYGEN SATURATION: 98 % | WEIGHT: 222 LBS | BODY MASS INDEX: 30.07 KG/M2

## 2020-01-03 DIAGNOSIS — E78.5 HYPERLIPIDEMIA, UNSPECIFIED HYPERLIPIDEMIA TYPE: ICD-10-CM

## 2020-01-03 DIAGNOSIS — K21.9 GASTROESOPHAGEAL REFLUX DISEASE, ESOPHAGITIS PRESENCE NOT SPECIFIED: ICD-10-CM

## 2020-01-03 DIAGNOSIS — F41.9 ANXIETY: Primary | ICD-10-CM

## 2020-01-03 DIAGNOSIS — M10.9 GOUT, UNSPECIFIED CAUSE, UNSPECIFIED CHRONICITY, UNSPECIFIED SITE: ICD-10-CM

## 2020-01-03 DIAGNOSIS — Z96.653 STATUS POST TOTAL BILATERAL KNEE REPLACEMENT: ICD-10-CM

## 2020-01-03 DIAGNOSIS — Z98.890 S/P KNEE SURGERY: ICD-10-CM

## 2020-01-03 DIAGNOSIS — Z12.11 COLON CANCER SCREENING: ICD-10-CM

## 2020-01-03 DIAGNOSIS — E11.9 TYPE 2 DIABETES MELLITUS WITHOUT COMPLICATION, WITHOUT LONG-TERM CURRENT USE OF INSULIN: ICD-10-CM

## 2020-01-03 DIAGNOSIS — N18.30 CKD (CHRONIC KIDNEY DISEASE) STAGE 3, GFR 30-59 ML/MIN: ICD-10-CM

## 2020-01-03 DIAGNOSIS — Z12.5 SCREENING PSA (PROSTATE SPECIFIC ANTIGEN): ICD-10-CM

## 2020-01-03 PROCEDURE — 1125F AMNT PAIN NOTED PAIN PRSNT: CPT | Mod: ,,, | Performed by: INTERNAL MEDICINE

## 2020-01-03 PROCEDURE — 99999 PR PBB SHADOW E&M-EST. PATIENT-LVL IV: ICD-10-PCS | Mod: PBBFAC,,, | Performed by: INTERNAL MEDICINE

## 2020-01-03 PROCEDURE — 1159F MED LIST DOCD IN RCRD: CPT | Mod: ,,, | Performed by: INTERNAL MEDICINE

## 2020-01-03 PROCEDURE — 1159F PR MEDICATION LIST DOCUMENTED IN MEDICAL RECORD: ICD-10-PCS | Mod: ,,, | Performed by: INTERNAL MEDICINE

## 2020-01-03 PROCEDURE — 99214 PR OFFICE/OUTPT VISIT, EST, LEVL IV, 30-39 MIN: ICD-10-PCS | Mod: S$PBB,,, | Performed by: INTERNAL MEDICINE

## 2020-01-03 PROCEDURE — 99214 OFFICE O/P EST MOD 30 MIN: CPT | Mod: S$PBB,,, | Performed by: INTERNAL MEDICINE

## 2020-01-03 PROCEDURE — 99999 PR PBB SHADOW E&M-EST. PATIENT-LVL IV: CPT | Mod: PBBFAC,,, | Performed by: INTERNAL MEDICINE

## 2020-01-03 PROCEDURE — 99214 OFFICE O/P EST MOD 30 MIN: CPT | Mod: PBBFAC | Performed by: INTERNAL MEDICINE

## 2020-01-03 PROCEDURE — 1125F PR PAIN SEVERITY QUANTIFIED, PAIN PRESENT: ICD-10-PCS | Mod: ,,, | Performed by: INTERNAL MEDICINE

## 2020-01-03 RX ORDER — SILDENAFIL 100 MG/1
100 TABLET, FILM COATED ORAL DAILY PRN
Qty: 8 TABLET | Refills: 9 | Status: SHIPPED | OUTPATIENT
Start: 2020-01-03

## 2020-01-03 RX ORDER — PANTOPRAZOLE SODIUM 40 MG/1
TABLET, DELAYED RELEASE ORAL
Qty: 90 TABLET | Refills: 3 | Status: SHIPPED | OUTPATIENT
Start: 2020-01-03 | End: 2020-07-06

## 2020-01-03 RX ORDER — ATORVASTATIN CALCIUM 40 MG/1
TABLET, FILM COATED ORAL
Qty: 30 TABLET | Refills: 11 | Status: CANCELLED | OUTPATIENT
Start: 2020-01-03

## 2020-01-03 RX ORDER — DULOXETIN HYDROCHLORIDE 60 MG/1
60 CAPSULE, DELAYED RELEASE ORAL DAILY
Qty: 90 CAPSULE | Refills: 3 | Status: SHIPPED | OUTPATIENT
Start: 2020-01-03 | End: 2020-11-23 | Stop reason: SDUPTHER

## 2020-01-03 RX ORDER — ATORVASTATIN CALCIUM 40 MG/1
40 TABLET, FILM COATED ORAL DAILY
Qty: 90 TABLET | Refills: 3 | Status: SHIPPED | OUTPATIENT
Start: 2020-01-03 | End: 2020-11-23 | Stop reason: SDUPTHER

## 2020-01-03 RX ORDER — DULOXETIN HYDROCHLORIDE 60 MG/1
60 CAPSULE, DELAYED RELEASE ORAL DAILY
Qty: 30 CAPSULE | Refills: 11 | Status: CANCELLED | OUTPATIENT
Start: 2020-01-03

## 2020-01-03 RX ORDER — MELOXICAM 7.5 MG/1
7.5 TABLET ORAL DAILY
Qty: 30 TABLET | Refills: 1 | Status: CANCELLED | OUTPATIENT
Start: 2020-01-03

## 2020-01-03 RX ORDER — ALPRAZOLAM 0.5 MG/1
TABLET ORAL
Qty: 15 TABLET | Refills: 2 | Status: CANCELLED | OUTPATIENT
Start: 2020-01-03

## 2020-01-03 RX ORDER — AMLODIPINE BESYLATE 5 MG/1
5 TABLET ORAL DAILY
Qty: 90 TABLET | Refills: 2 | Status: CANCELLED | OUTPATIENT
Start: 2020-01-03

## 2020-01-03 RX ORDER — AMLODIPINE BESYLATE 10 MG/1
10 TABLET ORAL DAILY
Qty: 90 TABLET | Refills: 3 | Status: SHIPPED | OUTPATIENT
Start: 2020-01-03 | End: 2020-11-23 | Stop reason: SDUPTHER

## 2020-01-03 RX ORDER — MELOXICAM 15 MG/1
15 TABLET ORAL DAILY
Qty: 30 TABLET | Refills: 2 | Status: SHIPPED | OUTPATIENT
Start: 2020-01-03 | End: 2020-08-18

## 2020-01-03 RX ORDER — GLIMEPIRIDE 4 MG/1
TABLET ORAL
Qty: 180 TABLET | Refills: 9 | Status: SHIPPED | OUTPATIENT
Start: 2020-01-03 | End: 2020-11-23 | Stop reason: SDUPTHER

## 2020-01-03 RX ORDER — GABAPENTIN 300 MG/1
300 CAPSULE ORAL 2 TIMES DAILY
Qty: 180 CAPSULE | Refills: 11 | Status: SHIPPED | OUTPATIENT
Start: 2020-01-03 | End: 2020-11-23 | Stop reason: SDUPTHER

## 2020-01-03 RX ORDER — ALLOPURINOL 100 MG/1
TABLET ORAL
Qty: 90 TABLET | Refills: 3 | Status: SHIPPED | OUTPATIENT
Start: 2020-01-03 | End: 2020-11-23 | Stop reason: SDUPTHER

## 2020-01-03 RX ORDER — GLIMEPIRIDE 4 MG/1
TABLET ORAL
Qty: 60 TABLET | Refills: 9 | Status: CANCELLED | OUTPATIENT
Start: 2020-01-03

## 2020-01-03 RX ORDER — ALPRAZOLAM 0.5 MG/1
TABLET ORAL
Qty: 15 TABLET | Refills: 2 | Status: SHIPPED | OUTPATIENT
Start: 2020-01-03 | End: 2020-03-05

## 2020-01-03 NOTE — PROGRESS NOTES
HISTORY OF PRESENT ILLNESS: Mr. Hatch is a 69-year-old gentleman coming in    today for followup of multiple medical problems today.          1. This is a 69 -year-old gentleman with diabetes mellitus type 2. He has had DM for 20 years.  He is on   Amaryl 4 mg in the morning, metformin, and Januvia. . His hemoglobin A1c 7.5  and recent glucose is 191  . He has been moving around more. He is able to work, but not exercising very much. No low Glucoses at home. He is having some nocturnal burning in feet.  He has lost 5 # since last visit.            2. He has chronic kidney disease, status post acute kidney injury back in    December 2013. Creatinine had gone up to 2.0 to 2.3. He had renal stones and    had lithotripsy last year. Most recent creatinine is 1.2, putting him at    stage II chronic kidney disease. He is still taking mobic 15 mg a day- wants more.  .    He has renal stones and sees a urologist in [place].        3. He has hyperlipidemia, which he has had for several years. He is on    Lipitor. His recent cholesterol is 134, HDL 42, LDL is 65.2 , and triglycerides    are 134.       4. He has OA of both knees, s/p right  knee replacement. Since last visit he had a left knee replacement.    Gong to try to see Ortho in [place].       5. Torn rotator cups- He is going to see MD in [facility] for his shoulder surgery.  Did not have surgery       6. ENZO- using CPAP every night and is doing well. Feels well rested.       7. htn -- bp is  controled to day--  Last visit we started him on amlodipine 5mg a day.          He has had several skin cancers removed.  Follows up regularly with his dermatologist in [place].                ROS : Gen - no fatigue or significant weight change  Eyes - no eye pain or visual changes  ENT - no hoarseness or sore throat  CV - No chest pain or SOB. NO palpitations.  Pulm - no cough or wheezing  GI - no N/V/D   no dysuria or incontinence  MS - no joint pain or muscle pain  Skin -  no rash, or c/o of skin lesions  Neuro -  dizziness--- memory is doing well. -  Heme - no abnormal bleeding or bruising  Endo - no polydipsia, or temperature changes  Psych - no anxiety or depression          PHYSICAL EXAMINATION:     /80   Pulse 76   Ht 6' (1.829 m)   Wt 100.7 kg (222 lb 0.1 oz)   SpO2 98%   BMI 30.11 kg/m²           GENERAL: He is a well-appearing 69-year-old gentleman in no acute distress.  HEENT: Ear canals are open. TMs are clear. Oropharynx is clear.  NECK: Supple. He has no JVD. Thyroid is not enlarged.  CARDIOVASCULAR: S1 and S2, regular rate and rhythm without murmur, gallop, or    Rub.  LUNGS: Clear bilaterally  ABDOMEN: Soft, nontender, no hepatosplenomegaly, no guarding or rebound    tenderness.  LOWER EXTREMITIES: No edema. Missing the middle finger on his left hand.                       ASSESSMENT:  1. Diabetes mellitus type 2: Januvia, Amaryl 4mg bid, metformin 1000mg Bid. We discussed appropriate diet and exercise. Discussed hypoglycemia.  He is drinking a lot of sweet tea and not exercising. He wishes to try lifestyle modification first.  Work on diet and discussed exercise.    Will try trulicity if not better.   Will check labs today, and if not better or acceptable we will stop the Januvia and start the trulicity   2. Anxiety-- He is cutting  down to using 1/2 xanax every morning .    stay on cymbalta. .  3. He has obstructive sleep apnea for which he uses CPAP. He was using it every night- he wake up well refreshed.    4. ED-- he has tried Viagra- cialias.    5. Back pain- seeing Chiropractor for this. Taking tramadol twice daily-- off NSAID due to h/o DEE.   reviewed-- drug  reviewed.  Questionnaire done-- pain contract signes last visit.   6. Peripheral neuropathy.  -Had to come off   elavil  == He is on this due to his oa of the knees and shoulder pains.  He had ARF in 2013 on NSAIDs.  Today asking for refill of his Mobic.  We took him off the Mobic because  of acute renal failure and his chronic kidney disease. So we discussed this again. Reviewed labs and will monitor.    7. HTN- will increase amlodipine to 10  mg a day - has had and elevated microalb/cratinine     Follow up in 3 months with labs

## 2020-01-14 ENCOUNTER — PATIENT OUTREACH (OUTPATIENT)
Dept: ADMINISTRATIVE | Facility: HOSPITAL | Age: 70
End: 2020-01-14

## 2020-02-11 ENCOUNTER — TELEPHONE (OUTPATIENT)
Dept: PODIATRY | Facility: CLINIC | Age: 70
End: 2020-02-11

## 2020-02-11 NOTE — TELEPHONE ENCOUNTER
----- Message from Melani Carvajal sent at 2/11/2020 10:01 AM CST -----  Type: Needs Medical Advice    Who Called:  Bozena burger [facility] Call Back Number: [phone]  Additional Information: states that she faxed a referral to the office for the patient. Please give call back to confirm

## 2020-02-19 ENCOUNTER — OFFICE VISIT (OUTPATIENT)
Dept: PODIATRY | Facility: CLINIC | Age: 70
End: 2020-02-19
Payer: MEDICARE

## 2020-02-19 VITALS
HEIGHT: 72 IN | OXYGEN SATURATION: 95 % | SYSTOLIC BLOOD PRESSURE: 141 MMHG | TEMPERATURE: 99 F | WEIGHT: 222 LBS | BODY MASS INDEX: 30.07 KG/M2 | RESPIRATION RATE: 15 BRPM | HEART RATE: 69 BPM | DIASTOLIC BLOOD PRESSURE: 71 MMHG

## 2020-02-19 DIAGNOSIS — L97.521 SKIN ULCER OF LEFT FOOT, LIMITED TO BREAKDOWN OF SKIN: ICD-10-CM

## 2020-02-19 DIAGNOSIS — E11.9 TYPE 2 DIABETES MELLITUS WITHOUT COMPLICATION, WITHOUT LONG-TERM CURRENT USE OF INSULIN: Primary | ICD-10-CM

## 2020-02-19 DIAGNOSIS — E11.49 TYPE II DIABETES MELLITUS WITH NEUROLOGICAL MANIFESTATIONS: ICD-10-CM

## 2020-02-19 PROCEDURE — 99999 PR PBB SHADOW E&M-EST. PATIENT-LVL III: ICD-10-PCS | Mod: PBBFAC,,, | Performed by: PODIATRIST

## 2020-02-19 PROCEDURE — 99213 OFFICE O/P EST LOW 20 MIN: CPT | Mod: PBBFAC | Performed by: PODIATRIST

## 2020-02-19 PROCEDURE — 99999 PR PBB SHADOW E&M-EST. PATIENT-LVL III: CPT | Mod: PBBFAC,,, | Performed by: PODIATRIST

## 2020-02-19 PROCEDURE — 99203 OFFICE O/P NEW LOW 30 MIN: CPT | Mod: S$PBB,,, | Performed by: PODIATRIST

## 2020-02-19 PROCEDURE — 99203 PR OFFICE/OUTPT VISIT, NEW, LEVL III, 30-44 MIN: ICD-10-PCS | Mod: S$PBB,,, | Performed by: PODIATRIST

## 2020-02-19 RX ORDER — DOXYCYCLINE 100 MG/1
100 CAPSULE ORAL EVERY 12 HOURS
COMMUNITY
End: 2020-08-22

## 2020-02-19 NOTE — LETTER
February 25, 2020      Brionna Diallo, RICK  [address] [facility]  [place] [address]           [facility]  [address] [place] [address]  Phone: [phone]  Fax: [phone]          Patient: Ellis Hatch   MR Number: [id number]   YOB: 1950   Date of Visit: 2/19/2020       Dear Brionna Diallo:    Thank you for referring Ellis Hatch to me for evaluation. Attached you will find relevant portions of my assessment and plan of care.    If you have questions, please do not hesitate to call me. I look forward to following Ellis Hatch along with you.    Sincerely,    Shaw Negron, PERICO    Enclosure  CC:  No Recipients    If you would like to receive this communication electronically, please contact [email or web address] or [phone] to request more information on [employer] Link access.    For providers and/or their staff who would like to refer a patient to [facility], please contact us through our one-stop-shop provider referral line, [facility], at [phone].    If you feel you have received this communication in error or would no longer like to receive these types of communications, please e-mail [email or web address]

## 2020-02-25 PROBLEM — E11.49 TYPE II DIABETES MELLITUS WITH NEUROLOGICAL MANIFESTATIONS: Status: ACTIVE | Noted: 2020-02-25

## 2020-02-25 PROBLEM — L97.521 SKIN ULCER OF LEFT FOOT, LIMITED TO BREAKDOWN OF SKIN: Status: ACTIVE | Noted: 2020-02-25
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
## 2020-03-05 RX ORDER — ALPRAZOLAM 0.5 MG/1
TABLET ORAL
Qty: 15 TABLET | Refills: 2 | Status: SHIPPED | OUTPATIENT
Start: 2020-03-05 | End: 2020-07-02 | Stop reason: SDUPTHER

## 2020-04-13 RX ORDER — METFORMIN HYDROCHLORIDE 500 MG/1
TABLET ORAL
Qty: 360 TABLET | Refills: 3 | Status: SHIPPED | OUTPATIENT
Start: 2020-04-13 | End: 2020-11-23 | Stop reason: SDUPTHER

## 2020-05-04 NOTE — TELEPHONE ENCOUNTER
----- Message from Vesta Varma sent at 5/4/2020  1:05 PM CDT -----  Contact: self/[phone]  Requesting an RX refill or new RX.  Is this a refill or new RX:  Refill 1  RX name and strength: SITagliptin (JANUVIA) 100 MG Tab  Directions (copy/paste from chart):    Is this a 30 day or 90 day RX:    Local pharmacy or mail order pharmacy:  Local pharmacy  Pharmacy name and phone # (copy/paste from chart):  [facility] - [facility] [address] [address] [phone] (Phone) [phone] (Fax)   Comments:

## 2020-06-11 ENCOUNTER — LAB VISIT (OUTPATIENT)
Dept: LAB | Facility: HOSPITAL | Age: 70
End: 2020-06-11
Attending: INTERNAL MEDICINE
Payer: MEDICARE

## 2020-06-11 DIAGNOSIS — Z12.5 SCREENING PSA (PROSTATE SPECIFIC ANTIGEN): ICD-10-CM

## 2020-06-11 DIAGNOSIS — E78.5 HYPERLIPIDEMIA, UNSPECIFIED HYPERLIPIDEMIA TYPE: ICD-10-CM

## 2020-06-11 DIAGNOSIS — E11.9 TYPE 2 DIABETES MELLITUS WITHOUT COMPLICATION, WITHOUT LONG-TERM CURRENT USE OF INSULIN: ICD-10-CM

## 2020-06-11 LAB
ALBUMIN SERPL BCP-MCNC: 4 G/DL (ref 3.5–5.2)
ALP SERPL-CCNC: 93 U/L (ref 55–135)
ALT SERPL W/O P-5'-P-CCNC: 34 U/L (ref 10–44)
ANION GAP SERPL CALC-SCNC: 8 MMOL/L (ref 8–16)
AST SERPL-CCNC: 33 U/L (ref 10–40)
BILIRUB SERPL-MCNC: 0.7 MG/DL (ref 0.1–1)
BUN SERPL-MCNC: 29 MG/DL (ref 8–23)
CALCIUM SERPL-MCNC: 9.2 MG/DL (ref 8.7–10.5)
CHLORIDE SERPL-SCNC: 105 MMOL/L (ref 95–110)
CHOLEST SERPL-MCNC: 135 MG/DL (ref 120–199)
CHOLEST/HDLC SERPL: 3.3 {RATIO} (ref 2–5)
CO2 SERPL-SCNC: 25 MMOL/L (ref 23–29)
COMPLEXED PSA SERPL-MCNC: 0.53 NG/ML (ref 0–4)
CREAT SERPL-MCNC: 1 MG/DL (ref 0.5–1.4)
EST. GFR  (AFRICAN AMERICAN): >60 ML/MIN/1.73 M^2
EST. GFR  (NON AFRICAN AMERICAN): >60 ML/MIN/1.73 M^2
ESTIMATED AVG GLUCOSE: 154 MG/DL (ref 68–131)
GLUCOSE SERPL-MCNC: 149 MG/DL (ref 70–110)
HBA1C MFR BLD HPLC: 7 % (ref 4.5–6.2)
HDLC SERPL-MCNC: 41 MG/DL (ref 40–75)
HDLC SERPL: 30.4 % (ref 20–50)
LDLC SERPL CALC-MCNC: 71 MG/DL (ref 63–159)
NONHDLC SERPL-MCNC: 94 MG/DL
POTASSIUM SERPL-SCNC: 4.7 MMOL/L (ref 3.5–5.1)
PROT SERPL-MCNC: 7.5 G/DL (ref 6–8.4)
SODIUM SERPL-SCNC: 138 MMOL/L (ref 136–145)
TRIGL SERPL-MCNC: 115 MG/DL (ref 30–150)

## 2020-06-11 PROCEDURE — 83036 HEMOGLOBIN GLYCOSYLATED A1C: CPT

## 2020-06-11 PROCEDURE — 80061 LIPID PANEL: CPT

## 2020-06-11 PROCEDURE — 80053 COMPREHEN METABOLIC PANEL: CPT

## 2020-06-11 PROCEDURE — 36415 COLL VENOUS BLD VENIPUNCTURE: CPT

## 2020-06-11 PROCEDURE — 84153 ASSAY OF PSA TOTAL: CPT

## 2020-06-18 ENCOUNTER — OFFICE VISIT (OUTPATIENT)
Dept: INTERNAL MEDICINE | Facility: CLINIC | Age: 70
End: 2020-06-18
Payer: MEDICARE

## 2020-06-18 VITALS
HEIGHT: 70 IN | WEIGHT: 219.38 LBS | HEART RATE: 74 BPM | BODY MASS INDEX: 31.41 KG/M2 | OXYGEN SATURATION: 98 % | DIASTOLIC BLOOD PRESSURE: 60 MMHG | SYSTOLIC BLOOD PRESSURE: 132 MMHG

## 2020-06-18 DIAGNOSIS — E11.49 TYPE II DIABETES MELLITUS WITH NEUROLOGICAL MANIFESTATIONS: ICD-10-CM

## 2020-06-18 DIAGNOSIS — E78.5 HYPERLIPIDEMIA, UNSPECIFIED HYPERLIPIDEMIA TYPE: ICD-10-CM

## 2020-06-18 DIAGNOSIS — E11.9 TYPE 2 DIABETES MELLITUS WITHOUT COMPLICATION, WITHOUT LONG-TERM CURRENT USE OF INSULIN: ICD-10-CM

## 2020-06-18 DIAGNOSIS — N40.0 BENIGN PROSTATIC HYPERPLASIA, UNSPECIFIED WHETHER LOWER URINARY TRACT SYMPTOMS PRESENT: ICD-10-CM

## 2020-06-18 DIAGNOSIS — N18.30 CKD (CHRONIC KIDNEY DISEASE) STAGE 3, GFR 30-59 ML/MIN: Primary | ICD-10-CM

## 2020-06-18 PROCEDURE — 99214 PR OFFICE/OUTPT VISIT, EST, LEVL IV, 30-39 MIN: ICD-10-PCS | Mod: S$PBB,,, | Performed by: INTERNAL MEDICINE

## 2020-06-18 PROCEDURE — 99999 PR PBB SHADOW E&M-EST. PATIENT-LVL IV: CPT | Mod: PBBFAC,,, | Performed by: INTERNAL MEDICINE

## 2020-06-18 PROCEDURE — 99214 OFFICE O/P EST MOD 30 MIN: CPT | Mod: S$PBB,,, | Performed by: INTERNAL MEDICINE

## 2020-06-18 PROCEDURE — 99214 OFFICE O/P EST MOD 30 MIN: CPT | Mod: PBBFAC | Performed by: INTERNAL MEDICINE

## 2020-06-18 PROCEDURE — 99999 PR PBB SHADOW E&M-EST. PATIENT-LVL IV: ICD-10-PCS | Mod: PBBFAC,,, | Performed by: INTERNAL MEDICINE

## 2020-06-18 NOTE — PROGRESS NOTES
HISTORY OF PRESENT ILLNESS: Mr. Hatch is a 70-year-old gentleman coming in    today for followup of multiple medical problems today.          1. This is a 69 -year-old gentleman with diabetes mellitus type 2. He has had DM for 20 years.  He is on   Amaryl 4 mg in the morning, metformin, and Januvia. . His hemoglobin A1c 7.0  and recent glucose is 149  . He has been moving around more. He is able to work, but not exercising very much. No low Glucoses at home. He is having some nocturnal burning in feet.            2. He has chronic kidney disease, status post acute kidney injury back in    December 2013. Creatinine had gone up to 2.0 to 2.3. He had renal stones and    had lithotripsy last year. Most recent creatinine is 1.0 , putting him at    stage II chronic kidney disease.   He has renal stones and sees a urologist in [place].        3. He has hyperlipidemia, which he has had for several years. He is on    Lipitor. His recent cholesterol is 135, HDL 41, LDL is 71.0 , and triglycerides    are 115.       4. He has OA of both knees, s/p right  knee replacement. Since last visit he had a left knee replacement.    Gong to try to see Ortho in [place].       5. Torn rotator cups- He is going to see MD in [facility] for his shoulder surgery.  Did not have surgery.  Getting plasma injected every now and then.       6. ENZO- using CPAP every night and is doing well. Feels well rested.       7. htn -- bp is  controled to day--  Last visit we started him on amlodipine 5mg a day.          He has had several skin cancers removed.  Follows up regularly with his dermatologist in [place].                ROS : Gen - no fatigue or significant weight change  Eyes - no eye pain or visual changes  ENT - no hoarseness or sore throat  CV - No chest pain or SOB. NO palpitations.  Pulm - no cough or wheezing  GI - no N/V/D   no dysuria or incontinence  MS - no joint pain or muscle pain  Skin - no rash, or c/o of skin lesions  Neuro -  " dizziness--- memory is doing well. -  Heme - no abnormal bleeding or bruising  Endo - no polydipsia, or temperature changes  Psych - no anxiety or depression          PHYSICAL EXAMINATION:       /60   Pulse 74   Ht 5' 10" (1.778 m)   Wt 99.5 kg (219 lb 5.7 oz)   SpO2 98%   BMI 31.47 kg/m²              GENERAL: He is a well-appearing 70-year-old gentleman in no acute distress.  HEENT: Ear canals are open. TMs are clear. Oropharynx is clear.  NECK: Supple. He has no JVD. Thyroid is not enlarged.    CARDIOVASCULAR: S1 and S2, regular rate and rhythm without murmur, gallop, or    Rub.  LUNGS: Clear bilaterally  ABDOMEN: Soft, nontender, no hepatosplenomegaly, no guarding or rebound    tenderness.  LOWER EXTREMITIES: No edema. Missing the middle finger on his left hand.                       ASSESSMENT:  1. Diabetes mellitus type 2: Januvia, Amaryl 4mg bid, metformin 1000mg Bid. We discussed appropriate diet and exercise. Discussed hypoglycemia.  He is drinking a lot of sweet tea and not exercising. He wishes to try lifestyle modification first.  Work on diet and discussed exercise.      labs look good.      2. Anxiety-- He is cutting  down to using 1/2 xanax every morning .    stay on cymbalta. .  3. He has obstructive sleep apnea for which he uses CPAP. He was using it every night- he wake up well refreshed.    4. ED-- he has tried Viagra- cialias.    5. Back pain- seeing Chiropractor for this. Taking tramadol twice daily-- off NSAID due to h/o DEE.   reviewed-- drug  reviewed.  Questionnaire done-- pain contract signes last visit.   6. Peripheral neuropathy.  -Had to come off   elavil  == He is on this due to his oa of the knees and shoulder pains.  He had ARF in 2013 on NSAIDs.  Today asking for refill of his Mobic.  We took him off the Mobic because of acute renal failure and his chronic kidney disease. So we discussed this again. Reviewed labs and will monitor.    7. HTN- better on  amlodipine to " 10  mg a day - has had and elevated microalb/cratinine      Follow up in 4 months with labs

## 2020-07-02 NOTE — TELEPHONE ENCOUNTER
----- Message from Sheila Grahma sent at 7/2/2020  3:37 PM CDT -----  Regarding: Pts wife Mrs. Hatch Mobile# [phone]  Requesting an RX refill or new RX.  Is this a refill or new RX: Refill  RX name and strength: ALPRAZolam (XANAX) 0.5 MG tablet  Directions (copy/paste from chart):  N/A  Is this a 30 day or 90 day RX:  N/A  Local pharmacy or mail order pharmacy:  [facility] - [place] [address]  Pharmacy name and phone # [phone] Fax# [phone]   Comments: Patient usually get a fifteen day supply and Mrs. Hatch would like for you to send  request for refills also please.

## 2020-07-03 RX ORDER — ALPRAZOLAM 0.5 MG/1
0.5 TABLET ORAL NIGHTLY PRN
Qty: 15 TABLET | Refills: 2 | Status: SHIPPED | OUTPATIENT
Start: 2020-07-03 | End: 2020-09-17

## 2020-07-17 DIAGNOSIS — Z71.89 COMPLEX CARE COORDINATION: ICD-10-CM

## 2020-08-19 ENCOUNTER — PATIENT OUTREACH (OUTPATIENT)
Dept: ADMINISTRATIVE | Facility: OTHER | Age: 70
End: 2020-08-19

## 2020-08-19 NOTE — PROGRESS NOTES
Health Maintenance Due   Topic Date Due    Shingles Vaccine (1 of 2) 02/24/2002    Eye Exam  07/23/2020     Updates were requested from care everywhere.  Chart was reviewed for overdue Proactive [facility] Encounters ([facility]) topics (CRS, Breast Cancer Screening, Eye exam)  Health Maintenance has been updated.  LINKS immunization registry triggered.  Immunizations were reconciled.  Updated patient's history.

## 2020-08-22 ENCOUNTER — HOSPITAL ENCOUNTER (EMERGENCY)
Facility: HOSPITAL | Age: 70
Discharge: HOME | End: 2020-08-22
Attending: EMERGENCY MEDICINE
Payer: MEDICARE

## 2020-08-22 VITALS
RESPIRATION RATE: 20 BRPM | WEIGHT: 220 LBS | HEART RATE: 97 BPM | SYSTOLIC BLOOD PRESSURE: 136 MMHG | OXYGEN SATURATION: 98 % | BODY MASS INDEX: 29.8 KG/M2 | TEMPERATURE: 99 F | DIASTOLIC BLOOD PRESSURE: 70 MMHG | HEIGHT: 72 IN

## 2020-08-22 DIAGNOSIS — A08.4 VIRAL GASTROENTERITIS: Primary | ICD-10-CM

## 2020-08-22 LAB
ALBUMIN SERPL BCP-MCNC: 3.8 G/DL (ref 3.5–5.2)
ALP SERPL-CCNC: 96 U/L (ref 55–135)
ALT SERPL W/O P-5'-P-CCNC: 29 U/L (ref 10–44)
ANION GAP SERPL CALC-SCNC: 14 MMOL/L (ref 8–16)
AST SERPL-CCNC: 21 U/L (ref 10–40)
BASOPHILS NFR BLD: 0 % (ref 0–1.9)
BILIRUB SERPL-MCNC: 0.8 MG/DL (ref 0.1–1)
BUN SERPL-MCNC: 27 MG/DL (ref 8–23)
CALCIUM SERPL-MCNC: 8.8 MG/DL (ref 8.7–10.5)
CHLORIDE SERPL-SCNC: 107 MMOL/L (ref 95–110)
CO2 SERPL-SCNC: 19 MMOL/L (ref 23–29)
CREAT SERPL-MCNC: 1.2 MG/DL (ref 0.5–1.4)
DIFFERENTIAL METHOD: ABNORMAL
EOSINOPHIL NFR BLD: 3 % (ref 0–8)
ERYTHROCYTE [DISTWIDTH] IN BLOOD BY AUTOMATED COUNT: 14.6 % (ref 11.5–14.5)
EST. GFR  (AFRICAN AMERICAN): >60 ML/MIN/1.73 M^2
EST. GFR  (NON AFRICAN AMERICAN): >60 ML/MIN/1.73 M^2
GLUCOSE SERPL-MCNC: 239 MG/DL (ref 70–110)
HCT VFR BLD AUTO: 46.1 % (ref 40–54)
HGB BLD-MCNC: 14.6 G/DL (ref 14–18)
IMM GRANULOCYTES # BLD AUTO: ABNORMAL K/UL (ref 0–0.04)
IMM GRANULOCYTES NFR BLD AUTO: ABNORMAL % (ref 0–0.5)
LACTATE SERPL-SCNC: 2.8 MMOL/L (ref 0.5–2.2)
LYMPHOCYTES NFR BLD: 17 % (ref 18–48)
MCH RBC QN AUTO: 29.3 PG (ref 27–31)
MCHC RBC AUTO-ENTMCNC: 31.7 G/DL (ref 32–36)
MCV RBC AUTO: 92 FL (ref 82–98)
METAMYELOCYTES NFR BLD MANUAL: 4 %
MONOCYTES NFR BLD: 7 % (ref 4–15)
MYELOCYTES NFR BLD MANUAL: 3 %
NEUTROPHILS NFR BLD: 42 % (ref 38–73)
NEUTS BAND NFR BLD MANUAL: 24 %
NRBC BLD-RTO: 0 /100 WBC
PLATELET # BLD AUTO: 166 K/UL (ref 150–350)
PLATELET BLD QL SMEAR: ABNORMAL
PMV BLD AUTO: 10.5 FL (ref 9.2–12.9)
POTASSIUM SERPL-SCNC: 4.4 MMOL/L (ref 3.5–5.1)
PROT SERPL-MCNC: 7.6 G/DL (ref 6–8.4)
RBC # BLD AUTO: 4.99 M/UL (ref 4.6–6.2)
SODIUM SERPL-SCNC: 140 MMOL/L (ref 136–145)
WBC # BLD AUTO: 9.3 K/UL (ref 3.9–12.7)

## 2020-08-22 PROCEDURE — 36415 COLL VENOUS BLD VENIPUNCTURE: CPT

## 2020-08-22 PROCEDURE — 85007 BL SMEAR W/DIFF WBC COUNT: CPT

## 2020-08-22 PROCEDURE — 96375 TX/PRO/DX INJ NEW DRUG ADDON: CPT

## 2020-08-22 PROCEDURE — 80053 COMPREHEN METABOLIC PANEL: CPT

## 2020-08-22 PROCEDURE — 83605 ASSAY OF LACTIC ACID: CPT

## 2020-08-22 PROCEDURE — 25000003 PHARM REV CODE 250: Performed by: EMERGENCY MEDICINE

## 2020-08-22 PROCEDURE — 96374 THER/PROPH/DIAG INJ IV PUSH: CPT

## 2020-08-22 PROCEDURE — 99284 EMERGENCY DEPT VISIT MOD MDM: CPT | Mod: 25

## 2020-08-22 PROCEDURE — 96361 HYDRATE IV INFUSION ADD-ON: CPT

## 2020-08-22 PROCEDURE — 85027 COMPLETE CBC AUTOMATED: CPT

## 2020-08-22 PROCEDURE — 63600175 PHARM REV CODE 636 W HCPCS: Performed by: EMERGENCY MEDICINE

## 2020-08-22 RX ORDER — DIPHENOXYLATE HYDROCHLORIDE AND ATROPINE SULFATE 2.5; .025 MG/1; MG/1
2 TABLET ORAL 4 TIMES DAILY PRN
Qty: 14 TABLET | Refills: 0 | Status: SHIPPED | OUTPATIENT
Start: 2020-08-22 | End: 2020-09-01

## 2020-08-22 RX ORDER — SODIUM CHLORIDE 9 MG/ML
1000 INJECTION, SOLUTION INTRAVENOUS
Status: COMPLETED | OUTPATIENT
Start: 2020-08-22 | End: 2020-08-22

## 2020-08-22 RX ORDER — METOCLOPRAMIDE HYDROCHLORIDE 5 MG/ML
10 INJECTION INTRAMUSCULAR; INTRAVENOUS
Status: COMPLETED | OUTPATIENT
Start: 2020-08-22 | End: 2020-08-22

## 2020-08-22 RX ORDER — HYOSCYAMINE SULFATE 0.5 MG/ML
0.5 INJECTION, SOLUTION SUBCUTANEOUS
Status: COMPLETED | OUTPATIENT
Start: 2020-08-22 | End: 2020-08-22

## 2020-08-22 RX ORDER — ONDANSETRON HYDROCHLORIDE 8 MG/1
8 TABLET, FILM COATED ORAL EVERY 12 HOURS PRN
Qty: 8 TABLET | Refills: 1 | Status: SHIPPED | OUTPATIENT
Start: 2020-08-22 | End: 2022-02-03

## 2020-08-22 RX ADMIN — HYOSCYAMINE SULFATE 0.5 MG: 0.5 INJECTION, SOLUTION SUBCUTANEOUS at 01:08

## 2020-08-22 RX ADMIN — SODIUM CHLORIDE 1000 ML: 0.9 INJECTION, SOLUTION INTRAVENOUS at 01:08

## 2020-08-22 RX ADMIN — METOCLOPRAMIDE 10 MG: 5 INJECTION, SOLUTION INTRAMUSCULAR; INTRAVENOUS at 01:08
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
## 2020-08-24 ENCOUNTER — OFFICE VISIT (OUTPATIENT)
Dept: PODIATRY | Facility: CLINIC | Age: 70
End: 2020-08-24
Payer: MEDICARE

## 2020-08-24 VITALS
HEART RATE: 74 BPM | WEIGHT: 220 LBS | TEMPERATURE: 98 F | SYSTOLIC BLOOD PRESSURE: 143 MMHG | OXYGEN SATURATION: 99 % | RESPIRATION RATE: 19 BRPM | DIASTOLIC BLOOD PRESSURE: 65 MMHG | HEIGHT: 72 IN | BODY MASS INDEX: 29.8 KG/M2

## 2020-08-24 DIAGNOSIS — L02.612 ABSCESS OF LEFT FOOT: ICD-10-CM

## 2020-08-24 DIAGNOSIS — E11.49 TYPE II DIABETES MELLITUS WITH NEUROLOGICAL MANIFESTATIONS: Primary | ICD-10-CM

## 2020-08-24 PROCEDURE — 87070 CULTURE OTHR SPECIMN AEROBIC: CPT

## 2020-08-24 PROCEDURE — 99999 PR PBB SHADOW E&M-EST. PATIENT-LVL V: ICD-10-PCS | Mod: PBBFAC,,, | Performed by: PODIATRIST

## 2020-08-24 PROCEDURE — 99214 PR OFFICE/OUTPT VISIT, EST, LEVL IV, 30-39 MIN: ICD-10-PCS | Mod: S$PBB,,, | Performed by: PODIATRIST

## 2020-08-24 PROCEDURE — 99999 PR PBB SHADOW E&M-EST. PATIENT-LVL V: CPT | Mod: PBBFAC,,, | Performed by: PODIATRIST

## 2020-08-24 PROCEDURE — 99215 OFFICE O/P EST HI 40 MIN: CPT | Mod: PBBFAC | Performed by: PODIATRIST

## 2020-08-24 PROCEDURE — 99214 OFFICE O/P EST MOD 30 MIN: CPT | Mod: S$PBB,,, | Performed by: PODIATRIST

## 2020-08-24 RX ORDER — SULFAMETHOXAZOLE AND TRIMETHOPRIM 400; 80 MG/1; MG/1
2 TABLET ORAL 2 TIMES DAILY
Qty: 56 TABLET | Refills: 0 | Status: SHIPPED | OUTPATIENT
Start: 2020-08-24 | End: 2020-09-07

## 2020-08-24 RX ORDER — ONDANSETRON 8 MG/1
TABLET, ORALLY DISINTEGRATING ORAL
COMMUNITY
Start: 2020-08-22 | End: 2021-03-16 | Stop reason: CLARIF

## 2020-08-27 ENCOUNTER — TELEPHONE (OUTPATIENT)
Dept: PODIATRY | Facility: CLINIC | Age: 70
End: 2020-08-27

## 2020-08-27 LAB — BACTERIA SPEC AEROBE CULT: NORMAL

## 2020-08-27 NOTE — TELEPHONE ENCOUNTER
----- Message from Shaw Negron DPM sent at 8/27/2020  1:17 PM CDT -----  Please call the patient regarding his abnormal result.  Call patient and advise culture and sensitivity only displayed normal skin bacteria however I do want him to continue taking the Bactrim as directed.
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
## 2020-08-31 ENCOUNTER — OFFICE VISIT (OUTPATIENT)
Dept: PODIATRY | Facility: CLINIC | Age: 70
End: 2020-08-31
Payer: MEDICARE

## 2020-08-31 VITALS
DIASTOLIC BLOOD PRESSURE: 57 MMHG | WEIGHT: 220 LBS | HEART RATE: 84 BPM | OXYGEN SATURATION: 99 % | HEIGHT: 72 IN | TEMPERATURE: 99 F | BODY MASS INDEX: 29.8 KG/M2 | RESPIRATION RATE: 20 BRPM | SYSTOLIC BLOOD PRESSURE: 110 MMHG

## 2020-08-31 DIAGNOSIS — E11.49 TYPE II DIABETES MELLITUS WITH NEUROLOGICAL MANIFESTATIONS: Primary | ICD-10-CM

## 2020-08-31 DIAGNOSIS — L02.612 ABSCESS OF LEFT FOOT: ICD-10-CM

## 2020-08-31 PROCEDURE — 99213 OFFICE O/P EST LOW 20 MIN: CPT | Mod: S$PBB,,, | Performed by: PODIATRIST

## 2020-08-31 PROCEDURE — 99999 PR PBB SHADOW E&M-EST. PATIENT-LVL V: CPT | Mod: PBBFAC,,, | Performed by: PODIATRIST

## 2020-08-31 PROCEDURE — 99215 OFFICE O/P EST HI 40 MIN: CPT | Mod: PBBFAC | Performed by: PODIATRIST

## 2020-08-31 PROCEDURE — 99999 PR PBB SHADOW E&M-EST. PATIENT-LVL V: ICD-10-PCS | Mod: PBBFAC,,, | Performed by: PODIATRIST

## 2020-08-31 PROCEDURE — 99213 PR OFFICE/OUTPT VISIT, EST, LEVL III, 20-29 MIN: ICD-10-PCS | Mod: S$PBB,,, | Performed by: PODIATRIST

## 2020-08-31 NOTE — LETTER
September 1, 2020      Eddie Delaney Jr., MD  [address]  [facility] [address]           [facility]  [address] [address] [address]  Phone: [phone]  Fax: [phone]          Patient: Ellis Hatch   MR Number: [id number]   YOB: 1950   Date of Visit: 8/31/2020       Dear Dr. Eddie Delaney Jr.:    Thank you for referring Ellis Hatch to me for evaluation. Attached you will find relevant portions of my assessment and plan of care.    If you have questions, please do not hesitate to call me. I look forward to following Ellis Hatch along with you.    Sincerely,    Shaw Negron, DPSHAVONNE    Enclosure  CC:  No Recipients    If you would like to receive this communication electronically, please contact [email or web address] or [phone] to request more information on [employer] Link access.    For providers and/or their staff who would like to refer a patient to [facility], please contact us through our one-stop-shop provider referral line, [facility], [place], at [phone].    If you feel you have received this communication in error or would no longer like to receive these types of communications, please e-mail [email or web address]
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
## 2020-09-18 DIAGNOSIS — E11.9 TYPE 2 DIABETES MELLITUS WITHOUT COMPLICATION: ICD-10-CM

## 2020-09-25 DIAGNOSIS — E11.9 TYPE 2 DIABETES MELLITUS WITHOUT COMPLICATION, UNSPECIFIED WHETHER LONG TERM INSULIN USE: ICD-10-CM

## 2020-10-13 ENCOUNTER — LAB VISIT (OUTPATIENT)
Dept: LAB | Facility: HOSPITAL | Age: 70
End: 2020-10-13
Attending: INTERNAL MEDICINE
Payer: MEDICARE

## 2020-10-13 DIAGNOSIS — E11.49 TYPE II DIABETES MELLITUS WITH NEUROLOGICAL MANIFESTATIONS: ICD-10-CM

## 2020-10-13 LAB
ALBUMIN SERPL BCP-MCNC: 4 G/DL (ref 3.5–5.2)
ALP SERPL-CCNC: 84 U/L (ref 55–135)
ALT SERPL W/O P-5'-P-CCNC: 32 U/L (ref 10–44)
ANION GAP SERPL CALC-SCNC: 8 MMOL/L (ref 8–16)
AST SERPL-CCNC: 24 U/L (ref 10–40)
BILIRUB SERPL-MCNC: 0.7 MG/DL (ref 0.1–1)
BUN SERPL-MCNC: 28 MG/DL (ref 8–23)
CALCIUM SERPL-MCNC: 9.2 MG/DL (ref 8.7–10.5)
CHLORIDE SERPL-SCNC: 107 MMOL/L (ref 95–110)
CO2 SERPL-SCNC: 25 MMOL/L (ref 23–29)
CREAT SERPL-MCNC: 1.1 MG/DL (ref 0.5–1.4)
EST. GFR  (AFRICAN AMERICAN): >60 ML/MIN/1.73 M^2
EST. GFR  (NON AFRICAN AMERICAN): >60 ML/MIN/1.73 M^2
ESTIMATED AVG GLUCOSE: 154 MG/DL (ref 68–131)
GLUCOSE SERPL-MCNC: 137 MG/DL (ref 70–110)
HBA1C MFR BLD HPLC: 7 % (ref 4.5–6.2)
POTASSIUM SERPL-SCNC: 4.8 MMOL/L (ref 3.5–5.1)
PROT SERPL-MCNC: 7.3 G/DL (ref 6–8.4)
SODIUM SERPL-SCNC: 140 MMOL/L (ref 136–145)

## 2020-10-13 PROCEDURE — 83036 HEMOGLOBIN GLYCOSYLATED A1C: CPT

## 2020-10-13 PROCEDURE — 36415 COLL VENOUS BLD VENIPUNCTURE: CPT

## 2020-10-13 PROCEDURE — 80053 COMPREHEN METABOLIC PANEL: CPT

## 2020-10-22 ENCOUNTER — OFFICE VISIT (OUTPATIENT)
Dept: INTERNAL MEDICINE | Facility: CLINIC | Age: 70
End: 2020-10-22
Payer: MEDICARE

## 2020-10-22 VITALS
OXYGEN SATURATION: 98 % | DIASTOLIC BLOOD PRESSURE: 74 MMHG | HEIGHT: 72 IN | WEIGHT: 216.94 LBS | SYSTOLIC BLOOD PRESSURE: 150 MMHG | BODY MASS INDEX: 29.38 KG/M2 | HEART RATE: 74 BPM

## 2020-10-22 DIAGNOSIS — E78.5 HYPERLIPIDEMIA, UNSPECIFIED HYPERLIPIDEMIA TYPE: ICD-10-CM

## 2020-10-22 DIAGNOSIS — E11.49 TYPE II DIABETES MELLITUS WITH NEUROLOGICAL MANIFESTATIONS: Primary | ICD-10-CM

## 2020-10-22 DIAGNOSIS — G62.9 PERIPHERAL POLYNEUROPATHY: ICD-10-CM

## 2020-10-22 DIAGNOSIS — E11.9 TYPE 2 DIABETES MELLITUS WITHOUT COMPLICATION, WITHOUT LONG-TERM CURRENT USE OF INSULIN: ICD-10-CM

## 2020-10-22 DIAGNOSIS — N18.31 STAGE 3A CHRONIC KIDNEY DISEASE: ICD-10-CM

## 2020-10-22 PROCEDURE — 99999 PR PBB SHADOW E&M-EST. PATIENT-LVL IV: ICD-10-PCS | Mod: PBBFAC,,, | Performed by: INTERNAL MEDICINE

## 2020-10-22 PROCEDURE — 99214 PR OFFICE/OUTPT VISIT, EST, LEVL IV, 30-39 MIN: ICD-10-PCS | Mod: S$PBB,,, | Performed by: INTERNAL MEDICINE

## 2020-10-22 PROCEDURE — 99999 PR PBB SHADOW E&M-EST. PATIENT-LVL IV: CPT | Mod: PBBFAC,,, | Performed by: INTERNAL MEDICINE

## 2020-10-22 PROCEDURE — 99214 OFFICE O/P EST MOD 30 MIN: CPT | Mod: S$PBB,,, | Performed by: INTERNAL MEDICINE

## 2020-10-22 PROCEDURE — 99214 OFFICE O/P EST MOD 30 MIN: CPT | Mod: PBBFAC | Performed by: INTERNAL MEDICINE

## 2020-10-22 NOTE — PROGRESS NOTES
HISTORY OF PRESENT ILLNESS: Mr. Hatch is a 70-year-old gentleman coming in    today for followup of multiple medical problems today.          1. This is a 70 -year-old gentleman with diabetes mellitus type 2. He has had DM for 20 years.  He is on   Amaryl 4 mg in the morning, metformin, and Januvia. . His hemoglobin A1c 7.0  and recent glucose is 137 . He has been moving around more. He is able to work, but not exercising very much. No low Glucoses at home. He is having some nocturnal burning in feet.            2. He has chronic kidney disease, status post acute kidney injury back in    December 2013. Creatinine had gone up to 2.0 to 2.3. He had renal stones and    had lithotripsy last year. Most recent creatinine is 1.1 , putting him at    stage II chronic kidney disease.   He has renal stones and sees a urologist in [place].        3. He has hyperlipidemia, which he has had for several years. He is on    Lipitor. His recent cholesterol is 135, HDL 41, LDL is 71.0 , and triglycerides    are 115.       4. He has OA of both knees, s/p right  knee replacement 2013. And left knee replacement in 2018.    Gong to try to see Ortho in [place].       5. Torn rotator cups- He is going to see MD in [facility] for his shoulder.  .  Did not have surgery.  Getting plasma injected every now and then- These helped a little.  He fell off the last step of a ladder since last visit- the last step- thaough he was off of it.  .       6. ENZO- using CPAP every night and is doing well. Feels well rested.       7. htn -- bp is  controled to day--  Last visit we started him on amlodipine 5mg a day.  He has all his supplies.          He has had several skin cancers removed.  Follows up regularly with his dermatologist in [place].                ROS : Gen - no fatigue -- he lost aobut 3 # since last visit--   Eyes - no eye pain or visual changes-- he has not been able to see optho due tho his doctor being unable to see him due to  demand.  He has an appointment in Jan .    ENT - no hoarseness or sore throat  CV - No chest pain or SOB. NO palpitations.  Pulm - no cough or wheezing  GI - no N/V/D   no dysuria or incontinence  MS - no joint pain or muscle pain  Skin - no rash, or c/o of skin lesions  Neuro -  dizziness--- memory is doing well. -  Heme - no abnormal bleeding or bruising  Endo - no polydipsia, or temperature changes  Psych - no anxiety or depression          PHYSICAL EXAMINATION:             BP (!) 150/74 (BP Location: Right arm, Patient Position: Sitting, BP Method: Large (Manual))   Pulse 74   Ht 6' (1.829 m)   Wt 98.4 kg (216 lb 14.9 oz)   SpO2 98%   BMI 29.42 kg/m²      General appearance: alert, appears stated age and cooperative  Head: Normocephalic, without obvious abnormality, atraumatic-  He has a t shaped wound o his scalp-  Left front-- stood up under his tool box.    Ears: normal TM's and external ear canals both ears  Nose: Nares normal. Septum midline. Mucosa normal. No drainage or sinus tenderness.  Throat: lips, mucosa, and tongue normal; teeth and gums normal  Neck: no adenopathy, no carotid bruit, no JVD, supple, symmetrical, trachea midline and thyroid not enlarged, symmetric, no tenderness/mass/nodules  Back: symmetric, no curvature. ROM normal. No CVA tenderness.  Lungs: clear to auscultation bilaterally  Chest wall: no tenderness  Heart: regular rate and rhythm, S1, S2 normal, no murmur, click, rub or gallop  Abdomen: soft, non-tender; bowel sounds normal; no masses,  no organomegaly  Extremities: extremities normal, atraumatic, no cyanosis or edema  Pulses: 2+ and symmetric  Skin: Skin color, texture, turgor normal. No rashes or lesions               ASSESSMENT:  1. Diabetes mellitus type 2: Januvia, Amaryl 4mg bid, metformin 1000mg Bid. We discussed appropriate diet and exercise. Discussed hypoglycemia.  He is drinking a lot of sweet tea and not exercising. He wishes to try lifestyle modification  first.  Work on diet and discussed exercise.      labs look good.       2. Anxiety-- He is cutting  down to using 1/2 xanax every morning-  .    stay on cymbalta. .drug  reviewed.  3. He has obstructive sleep apnea for which he uses CPAP. He was using it every night- he wake up well refreshed.    4. ED-- he has tried Viagra- cialias.    5. Back pain- seeing Chiropractor for this.- off NSAID due to h/o DEE.       6. Peripheral neuropathy.  -Had to come off   elavil  == He is on this due to his oa of the knees and shoulder pains.  He had ARF in 2013 on NSAIDs.  We took him off the Mobic because of acute renal failure and his chronic kidney disease. So we discussed this again. Reviewed labs and will monitor.    7. HTN- better on  amlodipine to 10  mg a day - has had and elevated microalb/cratinine      Follow up in 6  months with labs

## 2020-11-12 NOTE — TELEPHONE ENCOUNTER
----- Message from Aimee Thapa sent at 11/12/2020  2:43 PM CST -----  Contact: self [phone]  Type: Rx    Name of medication(s): ALPRAZolam (XANAX) 0.5 MG tablet    Is this a refill? New rx? refill    Who prescribed medication? Dr Delaney    Pharmacy Name, Phone, & Location:[facility] - [place] [address] [phone]      Comments: patient states that on his last apt, all of his medications were supposed  to have been called in

## 2020-11-15 RX ORDER — ALPRAZOLAM 0.5 MG/1
TABLET ORAL
Qty: 15 TABLET | Refills: 2 | OUTPATIENT
Start: 2020-11-15

## 2020-11-20 ENCOUNTER — TELEPHONE (OUTPATIENT)
Dept: INTERNAL MEDICINE | Facility: CLINIC | Age: 70
End: 2020-11-20

## 2020-11-20 NOTE — TELEPHONE ENCOUNTER
----- Message from Orin Webber sent at 11/20/2020  3:08 PM CST -----  Contact: Pt's wife-- [phone]  Requesting an RX refill or new RX.    Is this a refill or new RX: refill    RX name and strength: all of them    Pharmacy name and phone # (copy/paste from chart):    [facility] - [address] [address]  Phone: [phone] Fax: [phone]        Comments:

## 2020-11-23 RX ORDER — METFORMIN HYDROCHLORIDE 500 MG/1
1000 TABLET ORAL 2 TIMES DAILY WITH MEALS
Qty: 360 TABLET | Refills: 3 | Status: SHIPPED | OUTPATIENT
Start: 2020-11-23 | End: 2021-04-22 | Stop reason: SDUPTHER

## 2020-11-23 RX ORDER — ATORVASTATIN CALCIUM 40 MG/1
40 TABLET, FILM COATED ORAL DAILY
Qty: 90 TABLET | Refills: 3 | Status: SHIPPED | OUTPATIENT
Start: 2020-11-23 | End: 2021-04-22 | Stop reason: SDUPTHER

## 2020-11-23 RX ORDER — AMLODIPINE BESYLATE 10 MG/1
10 TABLET ORAL DAILY
Qty: 90 TABLET | Refills: 3 | Status: SHIPPED | OUTPATIENT
Start: 2020-11-23 | End: 2021-04-22 | Stop reason: SDUPTHER

## 2020-11-23 RX ORDER — ALLOPURINOL 100 MG/1
TABLET ORAL
Qty: 90 TABLET | Refills: 3 | Status: SHIPPED | OUTPATIENT
Start: 2020-11-23 | End: 2021-04-22 | Stop reason: SDUPTHER

## 2020-11-23 RX ORDER — TAMSULOSIN HYDROCHLORIDE 0.4 MG/1
0.4 CAPSULE ORAL DAILY
Qty: 90 CAPSULE | Refills: 3 | Status: SHIPPED | OUTPATIENT
Start: 2020-11-23 | End: 2021-11-29

## 2020-11-23 RX ORDER — PANTOPRAZOLE SODIUM 40 MG/1
40 TABLET, DELAYED RELEASE ORAL DAILY
Qty: 90 TABLET | Refills: 3 | Status: SHIPPED | OUTPATIENT
Start: 2020-11-23 | End: 2021-04-22 | Stop reason: SDUPTHER

## 2020-11-23 RX ORDER — GABAPENTIN 300 MG/1
300 CAPSULE ORAL 2 TIMES DAILY
Qty: 180 CAPSULE | Refills: 11 | Status: SHIPPED | OUTPATIENT
Start: 2020-11-23 | End: 2021-04-22 | Stop reason: SDUPTHER

## 2020-11-23 RX ORDER — DULOXETIN HYDROCHLORIDE 60 MG/1
60 CAPSULE, DELAYED RELEASE ORAL DAILY
Qty: 90 CAPSULE | Refills: 3 | Status: SHIPPED | OUTPATIENT
Start: 2020-11-23 | End: 2021-04-22 | Stop reason: SDUPTHER

## 2020-11-23 RX ORDER — GLIMEPIRIDE 4 MG/1
TABLET ORAL
Qty: 180 TABLET | Refills: 9 | Status: SHIPPED | OUTPATIENT
Start: 2020-11-23 | End: 2021-04-22 | Stop reason: SDUPTHER

## 2021-02-04 ENCOUNTER — TELEPHONE (OUTPATIENT)
Dept: INTERNAL MEDICINE | Facility: CLINIC | Age: 71
End: 2021-02-04

## 2021-02-09 ENCOUNTER — PES CALL (OUTPATIENT)
Dept: ADMINISTRATIVE | Facility: CLINIC | Age: 71
End: 2021-02-09

## 2021-02-12 ENCOUNTER — HOSPITAL ENCOUNTER (INPATIENT)
Facility: HOSPITAL | Age: 71
LOS: 2 days | Discharge: HOME | DRG: 445 | End: 2021-02-15
Attending: EMERGENCY MEDICINE | Admitting: INTERNAL MEDICINE
Payer: MEDICARE

## 2021-02-12 DIAGNOSIS — K92.2 GASTROINTESTINAL HEMORRHAGE, UNSPECIFIED GASTROINTESTINAL HEMORRHAGE TYPE: ICD-10-CM

## 2021-02-12 DIAGNOSIS — R14.0 ABDOMINAL DISTENSION: ICD-10-CM

## 2021-02-12 DIAGNOSIS — K52.9 GASTROENTERITIS: ICD-10-CM

## 2021-02-12 DIAGNOSIS — K80.20 SYMPTOMATIC CHOLELITHIASIS: Primary | ICD-10-CM

## 2021-02-12 DIAGNOSIS — R07.9 CHEST PAIN: ICD-10-CM

## 2021-02-12 DIAGNOSIS — K29.70 GASTRITIS, PRESENCE OF BLEEDING UNSPECIFIED, UNSPECIFIED CHRONICITY, UNSPECIFIED GASTRITIS TYPE: ICD-10-CM

## 2021-02-12 LAB
ALBUMIN SERPL BCP-MCNC: 3.9 G/DL (ref 3.5–5.2)
ALP SERPL-CCNC: 124 U/L (ref 55–135)
ALT SERPL W/O P-5'-P-CCNC: 37 U/L (ref 10–44)
ANION GAP SERPL CALC-SCNC: 9 MMOL/L (ref 8–16)
AST SERPL-CCNC: 24 U/L (ref 10–40)
BASOPHILS # BLD AUTO: 0.09 K/UL (ref 0–0.2)
BASOPHILS NFR BLD: 0.4 % (ref 0–1.9)
BILIRUB SERPL-MCNC: 0.4 MG/DL (ref 0.1–1)
BUN SERPL-MCNC: 24 MG/DL (ref 8–23)
CALCIUM SERPL-MCNC: 8.8 MG/DL (ref 8.7–10.5)
CHLORIDE SERPL-SCNC: 107 MMOL/L (ref 95–110)
CO2 SERPL-SCNC: 24 MMOL/L (ref 23–29)
CREAT SERPL-MCNC: 1.1 MG/DL (ref 0.5–1.4)
DIFFERENTIAL METHOD: ABNORMAL
EOSINOPHIL # BLD AUTO: 0.2 K/UL (ref 0–0.5)
EOSINOPHIL NFR BLD: 1.1 % (ref 0–8)
ERYTHROCYTE [DISTWIDTH] IN BLOOD BY AUTOMATED COUNT: 14 % (ref 11.5–14.5)
EST. GFR  (AFRICAN AMERICAN): >60 ML/MIN/1.73 M^2
EST. GFR  (NON AFRICAN AMERICAN): >60 ML/MIN/1.73 M^2
GLUCOSE SERPL-MCNC: 127 MG/DL (ref 70–110)
HCT VFR BLD AUTO: 44.7 % (ref 40–54)
HGB BLD-MCNC: 14.4 G/DL (ref 14–18)
IMM GRANULOCYTES # BLD AUTO: 0.08 K/UL (ref 0–0.04)
IMM GRANULOCYTES NFR BLD AUTO: 0.4 % (ref 0–0.5)
LIPASE SERPL-CCNC: 151 U/L (ref 4–60)
LYMPHOCYTES # BLD AUTO: 2.9 K/UL (ref 1–4.8)
LYMPHOCYTES NFR BLD: 13.5 % (ref 18–48)
MCH RBC QN AUTO: 28.7 PG (ref 27–31)
MCHC RBC AUTO-ENTMCNC: 32.2 G/DL (ref 32–36)
MCV RBC AUTO: 89 FL (ref 82–98)
MONOCYTES # BLD AUTO: 1.1 K/UL (ref 0.3–1)
MONOCYTES NFR BLD: 5 % (ref 4–15)
NEUTROPHILS # BLD AUTO: 16.9 K/UL (ref 1.8–7.7)
NEUTROPHILS NFR BLD: 79.6 % (ref 38–73)
NRBC BLD-RTO: 0 /100 WBC
PLATELET # BLD AUTO: 221 K/UL (ref 150–350)
PMV BLD AUTO: 10.2 FL (ref 9.2–12.9)
POTASSIUM SERPL-SCNC: 4.1 MMOL/L (ref 3.5–5.1)
PROT SERPL-MCNC: 7.5 G/DL (ref 6–8.4)
RBC # BLD AUTO: 5.02 M/UL (ref 4.6–6.2)
SARS-COV-2 RDRP RESP QL NAA+PROBE: NEGATIVE
SODIUM SERPL-SCNC: 140 MMOL/L (ref 136–145)
WBC # BLD AUTO: 21.26 K/UL (ref 3.9–12.7)

## 2021-02-12 PROCEDURE — 25500020 PHARM REV CODE 255

## 2021-02-12 PROCEDURE — 80053 COMPREHEN METABOLIC PANEL: CPT

## 2021-02-12 PROCEDURE — A9698 NON-RAD CONTRAST MATERIALNOC: HCPCS

## 2021-02-12 PROCEDURE — 83690 ASSAY OF LIPASE: CPT

## 2021-02-12 PROCEDURE — U0002 COVID-19 LAB TEST NON-CDC: HCPCS

## 2021-02-12 PROCEDURE — 63600175 PHARM REV CODE 636 W HCPCS: Performed by: EMERGENCY MEDICINE

## 2021-02-12 PROCEDURE — 96375 TX/PRO/DX INJ NEW DRUG ADDON: CPT

## 2021-02-12 PROCEDURE — 85025 COMPLETE CBC W/AUTO DIFF WBC: CPT

## 2021-02-12 PROCEDURE — 99285 EMERGENCY DEPT VISIT HI MDM: CPT | Mod: 25

## 2021-02-12 PROCEDURE — 36415 COLL VENOUS BLD VENIPUNCTURE: CPT

## 2021-02-12 RX ORDER — ONDANSETRON 2 MG/ML
8 INJECTION INTRAMUSCULAR; INTRAVENOUS
Status: COMPLETED | OUTPATIENT
Start: 2021-02-12 | End: 2021-02-12

## 2021-02-12 RX ORDER — MORPHINE SULFATE 4 MG/ML
4 INJECTION, SOLUTION INTRAMUSCULAR; INTRAVENOUS
Status: COMPLETED | OUTPATIENT
Start: 2021-02-12 | End: 2021-02-12

## 2021-02-12 RX ADMIN — MORPHINE SULFATE 4 MG: 4 INJECTION, SOLUTION INTRAMUSCULAR; INTRAVENOUS at 09:02

## 2021-02-12 RX ADMIN — ONDANSETRON 8 MG: 2 INJECTION INTRAMUSCULAR; INTRAVENOUS at 09:02

## 2021-02-12 RX ADMIN — IOHEXOL 1000 ML: 12 SOLUTION ORAL at 11:02

## 2021-02-13 PROBLEM — K92.2 GI BLEED: Status: ACTIVE | Noted: 2021-02-13

## 2021-02-13 PROBLEM — K80.20 SYMPTOMATIC CHOLELITHIASIS: Status: ACTIVE | Noted: 2021-02-13

## 2021-02-13 LAB
ABO + RH BLD: NORMAL
ALBUMIN SERPL BCP-MCNC: 3.2 G/DL (ref 3.5–5.2)
ALP SERPL-CCNC: 86 U/L (ref 55–135)
ALT SERPL W/O P-5'-P-CCNC: 26 U/L (ref 10–44)
ANION GAP SERPL CALC-SCNC: 11 MMOL/L (ref 8–16)
AST SERPL-CCNC: 18 U/L (ref 10–40)
BACTERIA #/AREA URNS HPF: NORMAL /HPF
BASOPHILS NFR BLD: 0 % (ref 0–1.9)
BILIRUB SERPL-MCNC: 0.7 MG/DL (ref 0.1–1)
BILIRUB UR QL STRIP: NEGATIVE
BLD GP AB SCN CELLS X3 SERPL QL: NORMAL
BUN SERPL-MCNC: 30 MG/DL (ref 8–23)
C DIFF GDH STL QL: NEGATIVE
C DIFF TOX A+B STL QL IA: NEGATIVE
CALCIUM SERPL-MCNC: 7.8 MG/DL (ref 8.7–10.5)
CHLORIDE SERPL-SCNC: 108 MMOL/L (ref 95–110)
CHOLEST SERPL-MCNC: 87 MG/DL (ref 120–199)
CHOLEST/HDLC SERPL: 2.6 {RATIO} (ref 2–5)
CLARITY UR: CLEAR
CO2 SERPL-SCNC: 20 MMOL/L (ref 23–29)
COLOR UR: YELLOW
CREAT SERPL-MCNC: 1.1 MG/DL (ref 0.5–1.4)
DIFFERENTIAL METHOD: ABNORMAL
EOSINOPHIL NFR BLD: 2 % (ref 0–8)
ERYTHROCYTE [DISTWIDTH] IN BLOOD BY AUTOMATED COUNT: 14.1 % (ref 11.5–14.5)
EST. GFR  (AFRICAN AMERICAN): >60 ML/MIN/1.73 M^2
EST. GFR  (NON AFRICAN AMERICAN): >60 ML/MIN/1.73 M^2
GLUCOSE SERPL-MCNC: 183 MG/DL (ref 70–110)
GLUCOSE UR QL STRIP: NEGATIVE
HCT VFR BLD AUTO: 38 % (ref 40–54)
HCT VFR BLD AUTO: 40.9 % (ref 40–54)
HDLC SERPL-MCNC: 34 MG/DL (ref 40–75)
HDLC SERPL: 39.1 % (ref 20–50)
HGB BLD-MCNC: 12.1 G/DL (ref 14–18)
HGB BLD-MCNC: 13 G/DL (ref 14–18)
HGB UR QL STRIP: NEGATIVE
HYALINE CASTS #/AREA URNS LPF: 0 /LPF
IMM GRANULOCYTES # BLD AUTO: ABNORMAL K/UL (ref 0–0.04)
IMM GRANULOCYTES NFR BLD AUTO: ABNORMAL % (ref 0–0.5)
KETONES UR QL STRIP: NEGATIVE
LDLC SERPL CALC-MCNC: 37.8 MG/DL (ref 63–159)
LEUKOCYTE ESTERASE UR QL STRIP: NEGATIVE
LIPASE SERPL-CCNC: 33 U/L (ref 4–60)
LYMPHOCYTES NFR BLD: 7 % (ref 18–48)
MAGNESIUM SERPL-MCNC: 1.6 MG/DL (ref 1.6–2.6)
MCH RBC QN AUTO: 28.3 PG (ref 27–31)
MCHC RBC AUTO-ENTMCNC: 31.8 G/DL (ref 32–36)
MCV RBC AUTO: 89 FL (ref 82–98)
METAMYELOCYTES NFR BLD MANUAL: 4 %
MICROSCOPIC COMMENT: NORMAL
MONOCYTES NFR BLD: 5 % (ref 4–15)
NEUTROPHILS NFR BLD: 68 % (ref 38–73)
NEUTS BAND NFR BLD MANUAL: 14 %
NITRITE UR QL STRIP: NEGATIVE
NONHDLC SERPL-MCNC: 53 MG/DL
NRBC BLD-RTO: 0 /100 WBC
OB PNL STL: POSITIVE
PH UR STRIP: 5 [PH] (ref 5–8)
PHOSPHATE SERPL-MCNC: 2.4 MG/DL (ref 2.7–4.5)
PLATELET # BLD AUTO: 182 K/UL (ref 150–350)
PMV BLD AUTO: 10.1 FL (ref 9.2–12.9)
POCT GLUCOSE: 102 MG/DL (ref 70–110)
POCT GLUCOSE: 141 MG/DL (ref 70–110)
POCT GLUCOSE: 200 MG/DL (ref 70–110)
POTASSIUM SERPL-SCNC: 4.4 MMOL/L (ref 3.5–5.1)
PROT SERPL-MCNC: 6.3 G/DL (ref 6–8.4)
PROT UR QL STRIP: ABNORMAL
RBC # BLD AUTO: 4.59 M/UL (ref 4.6–6.2)
RBC #/AREA URNS HPF: 0 /HPF (ref 0–4)
SODIUM SERPL-SCNC: 139 MMOL/L (ref 136–145)
SP GR UR STRIP: 1.02 (ref 1–1.03)
SQUAMOUS #/AREA URNS HPF: 3 /HPF
TRIGL SERPL-MCNC: 76 MG/DL (ref 30–150)
URN SPEC COLLECT METH UR: ABNORMAL
UROBILINOGEN UR STRIP-ACNC: NEGATIVE EU/DL
WBC # BLD AUTO: 15.28 K/UL (ref 3.9–12.7)
WBC #/AREA STL HPF: NORMAL /[HPF]
WBC #/AREA URNS HPF: 1 /HPF (ref 0–5)

## 2021-02-13 PROCEDURE — 87324 CLOSTRIDIUM AG IA: CPT

## 2021-02-13 PROCEDURE — 87045 FECES CULTURE AEROBIC BACT: CPT

## 2021-02-13 PROCEDURE — 87329 GIARDIA AG IA: CPT

## 2021-02-13 PROCEDURE — 25000003 PHARM REV CODE 250: Performed by: NURSE PRACTITIONER

## 2021-02-13 PROCEDURE — 87046 STOOL CULTR AEROBIC BACT EA: CPT | Mod: 59

## 2021-02-13 PROCEDURE — 85014 HEMATOCRIT: CPT

## 2021-02-13 PROCEDURE — 86900 BLOOD TYPING SEROLOGIC ABO: CPT

## 2021-02-13 PROCEDURE — 87209 SMEAR COMPLEX STAIN: CPT

## 2021-02-13 PROCEDURE — 96365 THER/PROPH/DIAG IV INF INIT: CPT

## 2021-02-13 PROCEDURE — 36415 COLL VENOUS BLD VENIPUNCTURE: CPT

## 2021-02-13 PROCEDURE — 85018 HEMOGLOBIN: CPT

## 2021-02-13 PROCEDURE — 94761 N-INVAS EAR/PLS OXIMETRY MLT: CPT

## 2021-02-13 PROCEDURE — 80053 COMPREHEN METABOLIC PANEL: CPT

## 2021-02-13 PROCEDURE — 25000003 PHARM REV CODE 250: Performed by: EMERGENCY MEDICINE

## 2021-02-13 PROCEDURE — 99900035 HC TECH TIME PER 15 MIN (STAT)

## 2021-02-13 PROCEDURE — 83036 HEMOGLOBIN GLYCOSYLATED A1C: CPT

## 2021-02-13 PROCEDURE — 99223 1ST HOSP IP/OBS HIGH 75: CPT | Mod: ,,, | Performed by: STUDENT IN AN ORGANIZED HEALTH CARE EDUCATION/TRAINING PROGRAM

## 2021-02-13 PROCEDURE — 89125 SPECIMEN FAT STAIN: CPT

## 2021-02-13 PROCEDURE — 87427 SHIGA-LIKE TOXIN AG IA: CPT | Mod: 59

## 2021-02-13 PROCEDURE — 87040 BLOOD CULTURE FOR BACTERIA: CPT | Mod: 59

## 2021-02-13 PROCEDURE — 87449 NOS EACH ORGANISM AG IA: CPT

## 2021-02-13 PROCEDURE — 80061 LIPID PANEL: CPT

## 2021-02-13 PROCEDURE — 85027 COMPLETE CBC AUTOMATED: CPT

## 2021-02-13 PROCEDURE — 99223 PR INITIAL HOSPITAL CARE,LEVL III: ICD-10-PCS | Mod: ,,, | Performed by: STUDENT IN AN ORGANIZED HEALTH CARE EDUCATION/TRAINING PROGRAM

## 2021-02-13 PROCEDURE — 63600175 PHARM REV CODE 636 W HCPCS: Performed by: EMERGENCY MEDICINE

## 2021-02-13 PROCEDURE — 11000001 HC ACUTE MED/SURG PRIVATE ROOM

## 2021-02-13 PROCEDURE — C9113 INJ PANTOPRAZOLE SODIUM, VIA: HCPCS | Performed by: STUDENT IN AN ORGANIZED HEALTH CARE EDUCATION/TRAINING PROGRAM

## 2021-02-13 PROCEDURE — 84100 ASSAY OF PHOSPHORUS: CPT

## 2021-02-13 PROCEDURE — 89055 LEUKOCYTE ASSESSMENT FECAL: CPT

## 2021-02-13 PROCEDURE — A4216 STERILE WATER/SALINE, 10 ML: HCPCS | Performed by: NURSE PRACTITIONER

## 2021-02-13 PROCEDURE — 63600175 PHARM REV CODE 636 W HCPCS: Performed by: STUDENT IN AN ORGANIZED HEALTH CARE EDUCATION/TRAINING PROGRAM

## 2021-02-13 PROCEDURE — 83690 ASSAY OF LIPASE: CPT

## 2021-02-13 PROCEDURE — 81000 URINALYSIS NONAUTO W/SCOPE: CPT

## 2021-02-13 PROCEDURE — 85007 BL SMEAR W/DIFF WBC COUNT: CPT

## 2021-02-13 PROCEDURE — 82656 EL-1 FECAL QUAL/SEMIQ: CPT

## 2021-02-13 PROCEDURE — 96376 TX/PRO/DX INJ SAME DRUG ADON: CPT

## 2021-02-13 PROCEDURE — 25000003 PHARM REV CODE 250: Performed by: STUDENT IN AN ORGANIZED HEALTH CARE EDUCATION/TRAINING PROGRAM

## 2021-02-13 PROCEDURE — 83735 ASSAY OF MAGNESIUM: CPT

## 2021-02-13 PROCEDURE — 82272 OCCULT BLD FECES 1-3 TESTS: CPT

## 2021-02-13 RX ORDER — ALPRAZOLAM 0.25 MG/1
0.5 TABLET ORAL NIGHTLY PRN
Status: DISCONTINUED | OUTPATIENT
Start: 2021-02-13 | End: 2021-02-15 | Stop reason: HOSPADM

## 2021-02-13 RX ORDER — METOCLOPRAMIDE HYDROCHLORIDE 5 MG/ML
10 INJECTION INTRAMUSCULAR; INTRAVENOUS ONCE
Status: DISCONTINUED | OUTPATIENT
Start: 2021-02-13 | End: 2021-02-13

## 2021-02-13 RX ORDER — ONDANSETRON 2 MG/ML
8 INJECTION INTRAMUSCULAR; INTRAVENOUS EVERY 8 HOURS PRN
Status: DISCONTINUED | OUTPATIENT
Start: 2021-02-13 | End: 2021-02-15 | Stop reason: HOSPADM

## 2021-02-13 RX ORDER — TAMSULOSIN HYDROCHLORIDE 0.4 MG/1
0.4 CAPSULE ORAL DAILY
Status: DISCONTINUED | OUTPATIENT
Start: 2021-02-13 | End: 2021-02-15 | Stop reason: HOSPADM

## 2021-02-13 RX ORDER — SODIUM CHLORIDE 0.9 % (FLUSH) 0.9 %
10 SYRINGE (ML) INJECTION
Status: DISCONTINUED | OUTPATIENT
Start: 2021-02-13 | End: 2021-02-15 | Stop reason: HOSPADM

## 2021-02-13 RX ORDER — MORPHINE SULFATE 2 MG/ML
2 INJECTION, SOLUTION INTRAMUSCULAR; INTRAVENOUS EVERY 4 HOURS PRN
Status: DISCONTINUED | OUTPATIENT
Start: 2021-02-13 | End: 2021-02-13

## 2021-02-13 RX ORDER — IBUPROFEN 200 MG
24 TABLET ORAL
Status: DISCONTINUED | OUTPATIENT
Start: 2021-02-13 | End: 2021-02-15 | Stop reason: HOSPADM

## 2021-02-13 RX ORDER — DULOXETIN HYDROCHLORIDE 30 MG/1
60 CAPSULE, DELAYED RELEASE ORAL DAILY
Status: DISCONTINUED | OUTPATIENT
Start: 2021-02-13 | End: 2021-02-15 | Stop reason: HOSPADM

## 2021-02-13 RX ORDER — LANOLIN ALCOHOL/MO/W.PET/CERES
800 CREAM (GRAM) TOPICAL
Status: DISCONTINUED | OUTPATIENT
Start: 2021-02-13 | End: 2021-02-15 | Stop reason: HOSPADM

## 2021-02-13 RX ORDER — TALC
6 POWDER (GRAM) TOPICAL NIGHTLY PRN
Status: DISCONTINUED | OUTPATIENT
Start: 2021-02-13 | End: 2021-02-15 | Stop reason: HOSPADM

## 2021-02-13 RX ORDER — IBUPROFEN 200 MG
16 TABLET ORAL
Status: DISCONTINUED | OUTPATIENT
Start: 2021-02-13 | End: 2021-02-15 | Stop reason: HOSPADM

## 2021-02-13 RX ORDER — GABAPENTIN 300 MG/1
300 CAPSULE ORAL 2 TIMES DAILY
Status: DISCONTINUED | OUTPATIENT
Start: 2021-02-13 | End: 2021-02-15 | Stop reason: HOSPADM

## 2021-02-13 RX ORDER — ATORVASTATIN CALCIUM 40 MG/1
40 TABLET, FILM COATED ORAL DAILY
Status: DISCONTINUED | OUTPATIENT
Start: 2021-02-13 | End: 2021-02-15 | Stop reason: HOSPADM

## 2021-02-13 RX ORDER — ONDANSETRON 2 MG/ML
4 INJECTION INTRAMUSCULAR; INTRAVENOUS ONCE
Status: COMPLETED | OUTPATIENT
Start: 2021-02-13 | End: 2021-02-13

## 2021-02-13 RX ORDER — INSULIN ASPART 100 [IU]/ML
0-5 INJECTION, SOLUTION INTRAVENOUS; SUBCUTANEOUS
Status: DISCONTINUED | OUTPATIENT
Start: 2021-02-13 | End: 2021-02-15 | Stop reason: HOSPADM

## 2021-02-13 RX ORDER — SODIUM CHLORIDE 9 MG/ML
INJECTION, SOLUTION INTRAVENOUS CONTINUOUS
Status: DISCONTINUED | OUTPATIENT
Start: 2021-02-13 | End: 2021-02-13

## 2021-02-13 RX ORDER — PANTOPRAZOLE SODIUM 40 MG/10ML
40 INJECTION, POWDER, LYOPHILIZED, FOR SOLUTION INTRAVENOUS 2 TIMES DAILY
Status: DISCONTINUED | OUTPATIENT
Start: 2021-02-13 | End: 2021-02-15 | Stop reason: HOSPADM

## 2021-02-13 RX ORDER — HYDROMORPHONE HYDROCHLORIDE 1 MG/ML
1 INJECTION, SOLUTION INTRAMUSCULAR; INTRAVENOUS; SUBCUTANEOUS
Status: DISCONTINUED | OUTPATIENT
Start: 2021-02-13 | End: 2021-02-15 | Stop reason: HOSPADM

## 2021-02-13 RX ORDER — PANTOPRAZOLE SODIUM 40 MG/1
40 TABLET, DELAYED RELEASE ORAL DAILY
Status: DISCONTINUED | OUTPATIENT
Start: 2021-02-13 | End: 2021-02-13

## 2021-02-13 RX ORDER — SODIUM CHLORIDE, SODIUM LACTATE, POTASSIUM CHLORIDE, CALCIUM CHLORIDE 600; 310; 30; 20 MG/100ML; MG/100ML; MG/100ML; MG/100ML
INJECTION, SOLUTION INTRAVENOUS CONTINUOUS
Status: DISCONTINUED | OUTPATIENT
Start: 2021-02-13 | End: 2021-02-15 | Stop reason: HOSPADM

## 2021-02-13 RX ORDER — AMLODIPINE BESYLATE 5 MG/1
10 TABLET ORAL DAILY
Status: DISCONTINUED | OUTPATIENT
Start: 2021-02-13 | End: 2021-02-13

## 2021-02-13 RX ORDER — GLUCAGON 1 MG
1 KIT INJECTION
Status: DISCONTINUED | OUTPATIENT
Start: 2021-02-13 | End: 2021-02-15 | Stop reason: HOSPADM

## 2021-02-13 RX ORDER — METOCLOPRAMIDE HYDROCHLORIDE 5 MG/ML
10 INJECTION INTRAMUSCULAR; INTRAVENOUS EVERY 6 HOURS PRN
Status: DISCONTINUED | OUTPATIENT
Start: 2021-02-13 | End: 2021-02-13

## 2021-02-13 RX ORDER — LOPERAMIDE HYDROCHLORIDE 2 MG/1
2 CAPSULE ORAL 2 TIMES DAILY PRN
Status: DISCONTINUED | OUTPATIENT
Start: 2021-02-13 | End: 2021-02-14

## 2021-02-13 RX ORDER — IPRATROPIUM BROMIDE AND ALBUTEROL SULFATE 2.5; .5 MG/3ML; MG/3ML
3 SOLUTION RESPIRATORY (INHALATION) EVERY 4 HOURS PRN
Status: DISCONTINUED | OUTPATIENT
Start: 2021-02-13 | End: 2021-02-15 | Stop reason: HOSPADM

## 2021-02-13 RX ORDER — ACETAMINOPHEN 325 MG/1
650 TABLET ORAL EVERY 6 HOURS PRN
Status: DISCONTINUED | OUTPATIENT
Start: 2021-02-13 | End: 2021-02-15 | Stop reason: HOSPADM

## 2021-02-13 RX ORDER — ONDANSETRON 2 MG/ML
8 INJECTION INTRAMUSCULAR; INTRAVENOUS
Status: COMPLETED | OUTPATIENT
Start: 2021-02-13 | End: 2021-02-13

## 2021-02-13 RX ADMIN — AMLODIPINE BESYLATE 10 MG: 5 TABLET ORAL at 09:02

## 2021-02-13 RX ADMIN — HYDROMORPHONE HYDROCHLORIDE 1 MG: 1 INJECTION, SOLUTION INTRAMUSCULAR; INTRAVENOUS; SUBCUTANEOUS at 09:02

## 2021-02-13 RX ADMIN — PANTOPRAZOLE SODIUM 40 MG: 40 INJECTION, POWDER, FOR SOLUTION INTRAVENOUS at 09:02

## 2021-02-13 RX ADMIN — PIPERACILLIN AND TAZOBACTAM 4.5 G: 4; .5 INJECTION, POWDER, LYOPHILIZED, FOR SOLUTION INTRAVENOUS; PARENTERAL at 04:02

## 2021-02-13 RX ADMIN — PANTOPRAZOLE SODIUM 40 MG: 40 TABLET, DELAYED RELEASE ORAL at 09:02

## 2021-02-13 RX ADMIN — SODIUM CHLORIDE, SODIUM LACTATE, POTASSIUM CHLORIDE, AND CALCIUM CHLORIDE: .6; .31; .03; .02 INJECTION, SOLUTION INTRAVENOUS at 07:02

## 2021-02-13 RX ADMIN — PIPERACILLIN AND TAZOBACTAM 4.5 G: 4; .5 INJECTION, POWDER, LYOPHILIZED, FOR SOLUTION INTRAVENOUS; PARENTERAL at 02:02

## 2021-02-13 RX ADMIN — ONDANSETRON 8 MG: 2 INJECTION INTRAMUSCULAR; INTRAVENOUS at 02:02

## 2021-02-13 RX ADMIN — GABAPENTIN 300 MG: 300 CAPSULE ORAL at 09:02

## 2021-02-13 RX ADMIN — SODIUM CHLORIDE: 0.9 INJECTION, SOLUTION INTRAVENOUS at 06:02

## 2021-02-13 RX ADMIN — SODIUM CHLORIDE 500 ML: 0.45 INJECTION, SOLUTION INTRAVENOUS at 03:02

## 2021-02-13 RX ADMIN — PIPERACILLIN AND TAZOBACTAM 4.5 G: 4; .5 INJECTION, POWDER, LYOPHILIZED, FOR SOLUTION INTRAVENOUS; PARENTERAL at 09:02

## 2021-02-13 RX ADMIN — DULOXETINE 60 MG: 30 CAPSULE, DELAYED RELEASE ORAL at 09:02

## 2021-02-13 RX ADMIN — TAMSULOSIN HYDROCHLORIDE 0.4 MG: 0.4 CAPSULE ORAL at 09:02

## 2021-02-13 RX ADMIN — LOPERAMIDE HYDROCHLORIDE 2 MG: 2 CAPSULE ORAL at 04:02

## 2021-02-13 RX ADMIN — Medication 10 ML: at 09:02

## 2021-02-13 RX ADMIN — ONDANSETRON 4 MG: 2 INJECTION, SOLUTION INTRAMUSCULAR; INTRAVENOUS at 09:02

## 2021-02-13 RX ADMIN — ATORVASTATIN CALCIUM 40 MG: 40 TABLET, FILM COATED ORAL at 09:02

## 2021-02-14 PROBLEM — K83.8 BILIARY SLUDGE: Status: ACTIVE | Noted: 2021-02-14

## 2021-02-14 LAB
ALBUMIN SERPL BCP-MCNC: 2.8 G/DL (ref 3.5–5.2)
ALP SERPL-CCNC: 67 U/L (ref 55–135)
ALT SERPL W/O P-5'-P-CCNC: 18 U/L (ref 10–44)
ANION GAP SERPL CALC-SCNC: 11 MMOL/L (ref 8–16)
AST SERPL-CCNC: 13 U/L (ref 10–40)
BASOPHILS # BLD AUTO: 0.04 K/UL (ref 0–0.2)
BASOPHILS NFR BLD: 0.3 % (ref 0–1.9)
BILIRUB SERPL-MCNC: 0.7 MG/DL (ref 0.1–1)
BUN SERPL-MCNC: 19 MG/DL (ref 8–23)
CALCIUM SERPL-MCNC: 7.9 MG/DL (ref 8.7–10.5)
CHLORIDE SERPL-SCNC: 106 MMOL/L (ref 95–110)
CO2 SERPL-SCNC: 22 MMOL/L (ref 23–29)
CREAT SERPL-MCNC: 1.1 MG/DL (ref 0.5–1.4)
DIFFERENTIAL METHOD: ABNORMAL
EOSINOPHIL # BLD AUTO: 0.2 K/UL (ref 0–0.5)
EOSINOPHIL NFR BLD: 1.7 % (ref 0–8)
ERYTHROCYTE [DISTWIDTH] IN BLOOD BY AUTOMATED COUNT: 14.5 % (ref 11.5–14.5)
EST. GFR  (AFRICAN AMERICAN): >60 ML/MIN/1.73 M^2
EST. GFR  (NON AFRICAN AMERICAN): >60 ML/MIN/1.73 M^2
ESTIMATED AVG GLUCOSE: 171 MG/DL (ref 68–131)
FAT STL SUDAN IV STN: NORMAL
FERRITIN SERPL-MCNC: 67 NG/ML (ref 20–300)
GLUCOSE SERPL-MCNC: 191 MG/DL (ref 70–110)
HBA1C MFR BLD: 7.6 % (ref 4–5.6)
HCT VFR BLD AUTO: 37.9 % (ref 40–54)
HCT VFR BLD AUTO: 39.1 % (ref 40–54)
HGB BLD-MCNC: 11.7 G/DL (ref 14–18)
HGB BLD-MCNC: 12.3 G/DL (ref 14–18)
IMM GRANULOCYTES # BLD AUTO: 0.06 K/UL (ref 0–0.04)
IMM GRANULOCYTES NFR BLD AUTO: 0.5 % (ref 0–0.5)
IRON SERPL-MCNC: 21 UG/DL (ref 45–160)
LYMPHOCYTES # BLD AUTO: 2.5 K/UL (ref 1–4.8)
LYMPHOCYTES NFR BLD: 20.4 % (ref 18–48)
MAGNESIUM SERPL-MCNC: 1.9 MG/DL (ref 1.6–2.6)
MCH RBC QN AUTO: 27.7 PG (ref 27–31)
MCHC RBC AUTO-ENTMCNC: 30.9 G/DL (ref 32–36)
MCV RBC AUTO: 90 FL (ref 82–98)
MONOCYTES # BLD AUTO: 0.6 K/UL (ref 0.3–1)
MONOCYTES NFR BLD: 4.7 % (ref 4–15)
NEUTROPHILS # BLD AUTO: 8.8 K/UL (ref 1.8–7.7)
NEUTROPHILS NFR BLD: 72.4 % (ref 38–73)
NRBC BLD-RTO: 0 /100 WBC
PHOSPHATE SERPL-MCNC: 2.5 MG/DL (ref 2.7–4.5)
PLATELET # BLD AUTO: 166 K/UL (ref 150–350)
PMV BLD AUTO: 10.2 FL (ref 9.2–12.9)
POCT GLUCOSE: 100 MG/DL (ref 70–110)
POCT GLUCOSE: 111 MG/DL (ref 70–110)
POCT GLUCOSE: 120 MG/DL (ref 70–110)
POCT GLUCOSE: 130 MG/DL (ref 70–110)
POTASSIUM SERPL-SCNC: 3.9 MMOL/L (ref 3.5–5.1)
PROT SERPL-MCNC: 5.8 G/DL (ref 6–8.4)
RBC # BLD AUTO: 4.23 M/UL (ref 4.6–6.2)
RETICS/RBC NFR AUTO: 1.3 % (ref 0.4–2)
SATURATED IRON: 6 % (ref 20–50)
SODIUM SERPL-SCNC: 139 MMOL/L (ref 136–145)
TOTAL IRON BINDING CAPACITY: 340 UG/DL (ref 250–450)
TRANSFERRIN SERPL-MCNC: 230 MG/DL (ref 200–375)
WBC # BLD AUTO: 12.14 K/UL (ref 3.9–12.7)

## 2021-02-14 PROCEDURE — 84100 ASSAY OF PHOSPHORUS: CPT

## 2021-02-14 PROCEDURE — 25000003 PHARM REV CODE 250: Performed by: NURSE PRACTITIONER

## 2021-02-14 PROCEDURE — 83540 ASSAY OF IRON: CPT

## 2021-02-14 PROCEDURE — 25000003 PHARM REV CODE 250: Performed by: STUDENT IN AN ORGANIZED HEALTH CARE EDUCATION/TRAINING PROGRAM

## 2021-02-14 PROCEDURE — 83735 ASSAY OF MAGNESIUM: CPT

## 2021-02-14 PROCEDURE — 99223 PR INITIAL HOSPITAL CARE,LEVL III: ICD-10-PCS | Mod: ,,, | Performed by: INTERNAL MEDICINE

## 2021-02-14 PROCEDURE — 99232 SBSQ HOSP IP/OBS MODERATE 35: CPT | Mod: ,,, | Performed by: STUDENT IN AN ORGANIZED HEALTH CARE EDUCATION/TRAINING PROGRAM

## 2021-02-14 PROCEDURE — 99223 1ST HOSP IP/OBS HIGH 75: CPT | Mod: ,,, | Performed by: INTERNAL MEDICINE

## 2021-02-14 PROCEDURE — 94761 N-INVAS EAR/PLS OXIMETRY MLT: CPT

## 2021-02-14 PROCEDURE — 99900035 HC TECH TIME PER 15 MIN (STAT)

## 2021-02-14 PROCEDURE — 11000001 HC ACUTE MED/SURG PRIVATE ROOM

## 2021-02-14 PROCEDURE — 80053 COMPREHEN METABOLIC PANEL: CPT

## 2021-02-14 PROCEDURE — 99232 PR SUBSEQUENT HOSPITAL CARE,LEVL II: ICD-10-PCS | Mod: ,,, | Performed by: STUDENT IN AN ORGANIZED HEALTH CARE EDUCATION/TRAINING PROGRAM

## 2021-02-14 PROCEDURE — 36415 COLL VENOUS BLD VENIPUNCTURE: CPT

## 2021-02-14 PROCEDURE — 82728 ASSAY OF FERRITIN: CPT

## 2021-02-14 PROCEDURE — 85025 COMPLETE CBC W/AUTO DIFF WBC: CPT

## 2021-02-14 PROCEDURE — C9113 INJ PANTOPRAZOLE SODIUM, VIA: HCPCS | Performed by: STUDENT IN AN ORGANIZED HEALTH CARE EDUCATION/TRAINING PROGRAM

## 2021-02-14 PROCEDURE — 85014 HEMATOCRIT: CPT

## 2021-02-14 PROCEDURE — 85045 AUTOMATED RETICULOCYTE COUNT: CPT

## 2021-02-14 PROCEDURE — 63600175 PHARM REV CODE 636 W HCPCS: Performed by: STUDENT IN AN ORGANIZED HEALTH CARE EDUCATION/TRAINING PROGRAM

## 2021-02-14 PROCEDURE — 85018 HEMOGLOBIN: CPT

## 2021-02-14 RX ORDER — CIPROFLOXACIN 500 MG/1
500 TABLET ORAL 2 TIMES DAILY
Qty: 14 TABLET | Refills: 0 | OUTPATIENT
Start: 2021-02-14 | End: 2021-02-21

## 2021-02-14 RX ORDER — METRONIDAZOLE 500 MG/1
500 TABLET ORAL EVERY 12 HOURS
Qty: 14 TABLET | Refills: 0 | OUTPATIENT
Start: 2021-02-14 | End: 2021-02-21

## 2021-02-14 RX ADMIN — PIPERACILLIN AND TAZOBACTAM 4.5 G: 4; .5 INJECTION, POWDER, LYOPHILIZED, FOR SOLUTION INTRAVENOUS; PARENTERAL at 01:02

## 2021-02-14 RX ADMIN — PIPERACILLIN AND TAZOBACTAM 4.5 G: 4; .5 INJECTION, POWDER, LYOPHILIZED, FOR SOLUTION INTRAVENOUS; PARENTERAL at 08:02

## 2021-02-14 RX ADMIN — DULOXETINE 60 MG: 30 CAPSULE, DELAYED RELEASE ORAL at 08:02

## 2021-02-14 RX ADMIN — PANTOPRAZOLE SODIUM 40 MG: 40 INJECTION, POWDER, FOR SOLUTION INTRAVENOUS at 08:02

## 2021-02-14 RX ADMIN — PIPERACILLIN AND TAZOBACTAM 4.5 G: 4; .5 INJECTION, POWDER, LYOPHILIZED, FOR SOLUTION INTRAVENOUS; PARENTERAL at 05:02

## 2021-02-14 RX ADMIN — ATORVASTATIN CALCIUM 40 MG: 40 TABLET, FILM COATED ORAL at 08:02

## 2021-02-14 RX ADMIN — GABAPENTIN 300 MG: 300 CAPSULE ORAL at 09:02

## 2021-02-14 RX ADMIN — PANTOPRAZOLE SODIUM 40 MG: 40 INJECTION, POWDER, FOR SOLUTION INTRAVENOUS at 09:02

## 2021-02-14 RX ADMIN — SODIUM PHOSPHATE, MONOBASIC, MONOHYDRATE 20.01 MMOL: 276; 142 INJECTION, SOLUTION INTRAVENOUS at 09:02

## 2021-02-14 RX ADMIN — TAMSULOSIN HYDROCHLORIDE 0.4 MG: 0.4 CAPSULE ORAL at 08:02

## 2021-02-14 RX ADMIN — GABAPENTIN 300 MG: 300 CAPSULE ORAL at 08:02

## 2021-02-15 ENCOUNTER — ANESTHESIA EVENT (OUTPATIENT)
Dept: ENDOSCOPY | Facility: HOSPITAL | Age: 71
DRG: 445 | End: 2021-02-15
Payer: MEDICARE

## 2021-02-15 ENCOUNTER — ANESTHESIA (OUTPATIENT)
Dept: ENDOSCOPY | Facility: HOSPITAL | Age: 71
DRG: 445 | End: 2021-02-15
Payer: MEDICARE

## 2021-02-15 VITALS
OXYGEN SATURATION: 93 % | SYSTOLIC BLOOD PRESSURE: 151 MMHG | DIASTOLIC BLOOD PRESSURE: 71 MMHG | WEIGHT: 207.63 LBS | RESPIRATION RATE: 16 BRPM | HEART RATE: 65 BPM | HEIGHT: 72 IN | TEMPERATURE: 98 F | BODY MASS INDEX: 28.12 KG/M2

## 2021-02-15 PROBLEM — K92.2 GI BLEED: Status: RESOLVED | Noted: 2021-02-13 | Resolved: 2021-02-15

## 2021-02-15 LAB
ALBUMIN SERPL BCP-MCNC: 3.2 G/DL (ref 3.5–5.2)
ALP SERPL-CCNC: 72 U/L (ref 55–135)
ALT SERPL W/O P-5'-P-CCNC: 22 U/L (ref 10–44)
ANION GAP SERPL CALC-SCNC: 12 MMOL/L (ref 8–16)
AST SERPL-CCNC: 17 U/L (ref 10–40)
BASOPHILS # BLD AUTO: 0.03 K/UL (ref 0–0.2)
BASOPHILS NFR BLD: 0.3 % (ref 0–1.9)
BILIRUB SERPL-MCNC: 0.8 MG/DL (ref 0.1–1)
BUN SERPL-MCNC: 11 MG/DL (ref 8–23)
CALCIUM SERPL-MCNC: 8.9 MG/DL (ref 8.7–10.5)
CHLORIDE SERPL-SCNC: 105 MMOL/L (ref 95–110)
CO2 SERPL-SCNC: 23 MMOL/L (ref 23–29)
CREAT SERPL-MCNC: 1 MG/DL (ref 0.5–1.4)
CRYPTOSP AG STL QL IA: NEGATIVE
DIFFERENTIAL METHOD: ABNORMAL
E COLI SXT1 STL QL IA: NEGATIVE
E COLI SXT2 STL QL IA: NEGATIVE
EOSINOPHIL # BLD AUTO: 0.2 K/UL (ref 0–0.5)
EOSINOPHIL NFR BLD: 1.4 % (ref 0–8)
ERYTHROCYTE [DISTWIDTH] IN BLOOD BY AUTOMATED COUNT: 14 % (ref 11.5–14.5)
EST. GFR  (AFRICAN AMERICAN): >60 ML/MIN/1.73 M^2
EST. GFR  (NON AFRICAN AMERICAN): >60 ML/MIN/1.73 M^2
G LAMBLIA AG STL QL IA: NEGATIVE
GLUCOSE SERPL-MCNC: 151 MG/DL (ref 70–110)
HCT VFR BLD AUTO: 40.2 % (ref 40–54)
HGB BLD-MCNC: 12.6 G/DL (ref 14–18)
IMM GRANULOCYTES # BLD AUTO: 0.02 K/UL (ref 0–0.04)
IMM GRANULOCYTES NFR BLD AUTO: 0.2 % (ref 0–0.5)
LYMPHOCYTES # BLD AUTO: 2.1 K/UL (ref 1–4.8)
LYMPHOCYTES NFR BLD: 19.8 % (ref 18–48)
MAGNESIUM SERPL-MCNC: 2 MG/DL (ref 1.6–2.6)
MCH RBC QN AUTO: 27.9 PG (ref 27–31)
MCHC RBC AUTO-ENTMCNC: 31.3 G/DL (ref 32–36)
MCV RBC AUTO: 89 FL (ref 82–98)
MONOCYTES # BLD AUTO: 0.5 K/UL (ref 0.3–1)
MONOCYTES NFR BLD: 4.6 % (ref 4–15)
NEUTROPHILS # BLD AUTO: 7.8 K/UL (ref 1.8–7.7)
NEUTROPHILS NFR BLD: 73.7 % (ref 38–73)
NRBC BLD-RTO: 0 /100 WBC
PHOSPHATE SERPL-MCNC: 3.4 MG/DL (ref 2.7–4.5)
PLATELET # BLD AUTO: 166 K/UL (ref 150–350)
PMV BLD AUTO: 10 FL (ref 9.2–12.9)
POCT GLUCOSE: 214 MG/DL (ref 70–110)
POTASSIUM SERPL-SCNC: 4 MMOL/L (ref 3.5–5.1)
PROT SERPL-MCNC: 6.6 G/DL (ref 6–8.4)
RBC # BLD AUTO: 4.52 M/UL (ref 4.6–6.2)
SODIUM SERPL-SCNC: 140 MMOL/L (ref 136–145)
WBC # BLD AUTO: 10.52 K/UL (ref 3.9–12.7)

## 2021-02-15 PROCEDURE — 88305 TISSUE EXAM BY PATHOLOGIST: CPT | Mod: 59 | Performed by: PATHOLOGY

## 2021-02-15 PROCEDURE — 25000003 PHARM REV CODE 250: Performed by: STUDENT IN AN ORGANIZED HEALTH CARE EDUCATION/TRAINING PROGRAM

## 2021-02-15 PROCEDURE — 63600175 PHARM REV CODE 636 W HCPCS: Performed by: STUDENT IN AN ORGANIZED HEALTH CARE EDUCATION/TRAINING PROGRAM

## 2021-02-15 PROCEDURE — 83735 ASSAY OF MAGNESIUM: CPT

## 2021-02-15 PROCEDURE — 43239 PR EGD, FLEX, W/BIOPSY, SGL/MULTI: ICD-10-PCS | Mod: ,,, | Performed by: INTERNAL MEDICINE

## 2021-02-15 PROCEDURE — 43239 EGD BIOPSY SINGLE/MULTIPLE: CPT | Mod: ,,, | Performed by: INTERNAL MEDICINE

## 2021-02-15 PROCEDURE — D9220A PRA ANESTHESIA: ICD-10-PCS | Mod: ANES,,, | Performed by: ANESTHESIOLOGY

## 2021-02-15 PROCEDURE — 27201012 HC FORCEPS, HOT/COLD, DISP: Performed by: INTERNAL MEDICINE

## 2021-02-15 PROCEDURE — 94761 N-INVAS EAR/PLS OXIMETRY MLT: CPT

## 2021-02-15 PROCEDURE — 88342 IMHCHEM/IMCYTCHM 1ST ANTB: CPT | Mod: 26,,, | Performed by: PATHOLOGY

## 2021-02-15 PROCEDURE — 88342 CHG IMMUNOCYTOCHEMISTRY: ICD-10-PCS | Mod: 26,,, | Performed by: PATHOLOGY

## 2021-02-15 PROCEDURE — 63600175 PHARM REV CODE 636 W HCPCS: Performed by: NURSE ANESTHETIST, CERTIFIED REGISTERED

## 2021-02-15 PROCEDURE — 99900035 HC TECH TIME PER 15 MIN (STAT)

## 2021-02-15 PROCEDURE — 36415 COLL VENOUS BLD VENIPUNCTURE: CPT

## 2021-02-15 PROCEDURE — 84100 ASSAY OF PHOSPHORUS: CPT

## 2021-02-15 PROCEDURE — C9113 INJ PANTOPRAZOLE SODIUM, VIA: HCPCS | Performed by: STUDENT IN AN ORGANIZED HEALTH CARE EDUCATION/TRAINING PROGRAM

## 2021-02-15 PROCEDURE — 43239 EGD BIOPSY SINGLE/MULTIPLE: CPT | Performed by: INTERNAL MEDICINE

## 2021-02-15 PROCEDURE — 37000008 HC ANESTHESIA 1ST 15 MINUTES: Performed by: INTERNAL MEDICINE

## 2021-02-15 PROCEDURE — D9220A PRA ANESTHESIA: Mod: ANES,,, | Performed by: ANESTHESIOLOGY

## 2021-02-15 PROCEDURE — D9220A PRA ANESTHESIA: Mod: CRNA,,, | Performed by: NURSE ANESTHETIST, CERTIFIED REGISTERED

## 2021-02-15 PROCEDURE — 80053 COMPREHEN METABOLIC PANEL: CPT

## 2021-02-15 PROCEDURE — 25000003 PHARM REV CODE 250: Performed by: INTERNAL MEDICINE

## 2021-02-15 PROCEDURE — 25000003 PHARM REV CODE 250: Performed by: NURSE ANESTHETIST, CERTIFIED REGISTERED

## 2021-02-15 PROCEDURE — 25000003 PHARM REV CODE 250: Performed by: NURSE PRACTITIONER

## 2021-02-15 PROCEDURE — 88342 IMHCHEM/IMCYTCHM 1ST ANTB: CPT | Performed by: PATHOLOGY

## 2021-02-15 PROCEDURE — 85025 COMPLETE CBC W/AUTO DIFF WBC: CPT

## 2021-02-15 PROCEDURE — 88305 TISSUE EXAM BY PATHOLOGIST: ICD-10-PCS | Mod: 26,,, | Performed by: PATHOLOGY

## 2021-02-15 PROCEDURE — D9220A PRA ANESTHESIA: ICD-10-PCS | Mod: CRNA,,, | Performed by: NURSE ANESTHETIST, CERTIFIED REGISTERED

## 2021-02-15 PROCEDURE — 88305 TISSUE EXAM BY PATHOLOGIST: CPT | Mod: 26,,, | Performed by: PATHOLOGY

## 2021-02-15 RX ORDER — PROPOFOL 10 MG/ML
VIAL (ML) INTRAVENOUS
Status: DISCONTINUED | OUTPATIENT
Start: 2021-02-15 | End: 2021-02-15

## 2021-02-15 RX ORDER — SODIUM CHLORIDE 9 MG/ML
INJECTION, SOLUTION INTRAVENOUS CONTINUOUS
Status: DISCONTINUED | OUTPATIENT
Start: 2021-02-15 | End: 2021-02-15 | Stop reason: HOSPADM

## 2021-02-15 RX ORDER — SODIUM CHLORIDE 9 MG/ML
INJECTION, SOLUTION INTRAVENOUS CONTINUOUS
Status: DISCONTINUED | OUTPATIENT
Start: 2021-02-15 | End: 2021-02-15

## 2021-02-15 RX ORDER — LIDOCAINE HCL/PF 100 MG/5ML
SYRINGE (ML) INTRAVENOUS
Status: DISCONTINUED | OUTPATIENT
Start: 2021-02-15 | End: 2021-02-15

## 2021-02-15 RX ADMIN — LIDOCAINE HYDROCHLORIDE 100 MG: 20 INJECTION INTRAVENOUS at 08:02

## 2021-02-15 RX ADMIN — SODIUM CHLORIDE, SODIUM LACTATE, POTASSIUM CHLORIDE, AND CALCIUM CHLORIDE: .6; .31; .03; .02 INJECTION, SOLUTION INTRAVENOUS at 12:02

## 2021-02-15 RX ADMIN — PIPERACILLIN AND TAZOBACTAM 4.5 G: 4; .5 INJECTION, POWDER, LYOPHILIZED, FOR SOLUTION INTRAVENOUS; PARENTERAL at 12:02

## 2021-02-15 RX ADMIN — PANTOPRAZOLE SODIUM 40 MG: 40 INJECTION, POWDER, FOR SOLUTION INTRAVENOUS at 10:02

## 2021-02-15 RX ADMIN — TAMSULOSIN HYDROCHLORIDE 0.4 MG: 0.4 CAPSULE ORAL at 10:02

## 2021-02-15 RX ADMIN — SODIUM CHLORIDE: 0.9 INJECTION, SOLUTION INTRAVENOUS at 08:02

## 2021-02-15 RX ADMIN — PROPOFOL 150 MG: 10 INJECTION, EMULSION INTRAVENOUS at 08:02

## 2021-02-15 RX ADMIN — ATORVASTATIN CALCIUM 40 MG: 40 TABLET, FILM COATED ORAL at 10:02

## 2021-02-15 RX ADMIN — DULOXETINE 60 MG: 30 CAPSULE, DELAYED RELEASE ORAL at 10:02

## 2021-02-15 RX ADMIN — GABAPENTIN 300 MG: 300 CAPSULE ORAL at 10:02

## 2021-02-15 RX ADMIN — PIPERACILLIN AND TAZOBACTAM 4.5 G: 4; .5 INJECTION, POWDER, LYOPHILIZED, FOR SOLUTION INTRAVENOUS; PARENTERAL at 10:02

## 2021-02-17 ENCOUNTER — PATIENT OUTREACH (OUTPATIENT)
Dept: ADMINISTRATIVE | Facility: CLINIC | Age: 71
End: 2021-02-17

## 2021-02-17 ENCOUNTER — PES CALL (OUTPATIENT)
Dept: ADMINISTRATIVE | Facility: CLINIC | Age: 71
End: 2021-02-17

## 2021-02-17 LAB — BACTERIA STL CULT: NORMAL

## 2021-02-18 ENCOUNTER — TELEPHONE (OUTPATIENT)
Dept: MEDSURG UNIT | Facility: HOSPITAL | Age: 71
End: 2021-02-18

## 2021-02-18 LAB
BACTERIA BLD CULT: NORMAL
BACTERIA BLD CULT: NORMAL

## 2021-02-19 ENCOUNTER — PATIENT OUTREACH (OUTPATIENT)
Dept: ADMINISTRATIVE | Facility: HOSPITAL | Age: 71
End: 2021-02-19

## 2021-02-19 ENCOUNTER — TELEPHONE (OUTPATIENT)
Dept: GASTROENTEROLOGY | Facility: CLINIC | Age: 71
End: 2021-02-19

## 2021-02-19 LAB
ELASTASE 1, FECAL: 151 MCG/G
O+P STL MICRO: NORMAL

## 2021-02-23 LAB
FINAL PATHOLOGIC DIAGNOSIS: NORMAL
GROSS: NORMAL
Lab: NORMAL
MICROSCOPIC EXAM: NORMAL

## 2021-02-25 DIAGNOSIS — D50.9 IRON DEFICIENCY ANEMIA, UNSPECIFIED IRON DEFICIENCY ANEMIA TYPE: Primary | ICD-10-CM

## 2021-02-25 DIAGNOSIS — Z01.818 PRE-OP TESTING: ICD-10-CM

## 2021-02-25 DIAGNOSIS — R19.5 FECAL OCCULT BLOOD TEST POSITIVE: ICD-10-CM

## 2021-03-01 ENCOUNTER — TELEPHONE (OUTPATIENT)
Dept: GASTROENTEROLOGY | Facility: CLINIC | Age: 71
End: 2021-03-01

## 2021-03-02 ENCOUNTER — LAB VISIT (OUTPATIENT)
Dept: FAMILY MEDICINE | Facility: CLINIC | Age: 71
End: 2021-03-02
Payer: MEDICARE

## 2021-03-02 DIAGNOSIS — Z01.818 PRE-OP TESTING: ICD-10-CM

## 2021-03-02 PROCEDURE — U0005 INFEC AGEN DETEC AMPLI PROBE: HCPCS

## 2021-03-02 PROCEDURE — U0003 INFECTIOUS AGENT DETECTION BY NUCLEIC ACID (DNA OR RNA); SEVERE ACUTE RESPIRATORY SYNDROME CORONAVIRUS 2 (SARS-COV-2) (CORONAVIRUS DISEASE [COVID-19]), AMPLIFIED PROBE TECHNIQUE, MAKING USE OF HIGH THROUGHPUT TECHNOLOGIES AS DESCRIBED BY CMS-2020-01-R: HCPCS

## 2021-03-03 LAB — SARS-COV-2 RNA RESP QL NAA+PROBE: NOT DETECTED

## 2021-03-05 ENCOUNTER — HOSPITAL ENCOUNTER (OUTPATIENT)
Facility: HOSPITAL | Age: 71
Discharge: HOME | End: 2021-03-05
Attending: INTERNAL MEDICINE | Admitting: INTERNAL MEDICINE
Payer: MEDICARE

## 2021-03-05 ENCOUNTER — ANESTHESIA (OUTPATIENT)
Dept: ENDOSCOPY | Facility: HOSPITAL | Age: 71
End: 2021-03-05
Payer: MEDICARE

## 2021-03-05 ENCOUNTER — ANESTHESIA EVENT (OUTPATIENT)
Dept: ENDOSCOPY | Facility: HOSPITAL | Age: 71
End: 2021-03-05
Payer: MEDICARE

## 2021-03-05 DIAGNOSIS — D50.9 FE DEFICIENCY ANEMIA: ICD-10-CM

## 2021-03-05 DIAGNOSIS — K57.90 DIVERTICULOSIS: ICD-10-CM

## 2021-03-05 DIAGNOSIS — K63.5 POLYP OF COLON, UNSPECIFIED PART OF COLON, UNSPECIFIED TYPE: Primary | ICD-10-CM

## 2021-03-05 DIAGNOSIS — K64.8 INTERNAL HEMORRHOIDS: ICD-10-CM

## 2021-03-05 PROCEDURE — 45380 COLONOSCOPY AND BIOPSY: CPT | Mod: 59,,, | Performed by: INTERNAL MEDICINE

## 2021-03-05 PROCEDURE — D9220A PRA ANESTHESIA: Mod: CRNA,,, | Performed by: NURSE ANESTHETIST, CERTIFIED REGISTERED

## 2021-03-05 PROCEDURE — 45380 PR COLONOSCOPY,BIOPSY: ICD-10-PCS | Mod: 59,,, | Performed by: INTERNAL MEDICINE

## 2021-03-05 PROCEDURE — 45380 COLONOSCOPY AND BIOPSY: CPT | Performed by: INTERNAL MEDICINE

## 2021-03-05 PROCEDURE — 25000003 PHARM REV CODE 250: Performed by: INTERNAL MEDICINE

## 2021-03-05 PROCEDURE — D9220A PRA ANESTHESIA: ICD-10-PCS | Mod: ANES,,, | Performed by: ANESTHESIOLOGY

## 2021-03-05 PROCEDURE — 45385 PR COLONOSCOPY,REMV LESN,SNARE: ICD-10-PCS | Mod: 22,,, | Performed by: INTERNAL MEDICINE

## 2021-03-05 PROCEDURE — 88305 TISSUE EXAM BY PATHOLOGIST: ICD-10-PCS | Mod: 26,,, | Performed by: PATHOLOGY

## 2021-03-05 PROCEDURE — 45385 COLONOSCOPY W/LESION REMOVAL: CPT | Performed by: INTERNAL MEDICINE

## 2021-03-05 PROCEDURE — D9220A PRA ANESTHESIA: Mod: ANES,,, | Performed by: ANESTHESIOLOGY

## 2021-03-05 PROCEDURE — 27201089 HC SNARE, DISP (ANY): Performed by: INTERNAL MEDICINE

## 2021-03-05 PROCEDURE — 45385 COLONOSCOPY W/LESION REMOVAL: CPT | Mod: 22,,, | Performed by: INTERNAL MEDICINE

## 2021-03-05 PROCEDURE — 63600175 PHARM REV CODE 636 W HCPCS: Performed by: NURSE ANESTHETIST, CERTIFIED REGISTERED

## 2021-03-05 PROCEDURE — 37000009 HC ANESTHESIA EA ADD 15 MINS: Performed by: INTERNAL MEDICINE

## 2021-03-05 PROCEDURE — 88305 TISSUE EXAM BY PATHOLOGIST: CPT | Performed by: PATHOLOGY

## 2021-03-05 PROCEDURE — 25000003 PHARM REV CODE 250: Performed by: NURSE ANESTHETIST, CERTIFIED REGISTERED

## 2021-03-05 PROCEDURE — 27201012 HC FORCEPS, HOT/COLD, DISP: Performed by: INTERNAL MEDICINE

## 2021-03-05 PROCEDURE — 88305 TISSUE EXAM BY PATHOLOGIST: CPT | Mod: 26,,, | Performed by: PATHOLOGY

## 2021-03-05 PROCEDURE — 37000008 HC ANESTHESIA 1ST 15 MINUTES: Performed by: INTERNAL MEDICINE

## 2021-03-05 PROCEDURE — D9220A PRA ANESTHESIA: ICD-10-PCS | Mod: CRNA,,, | Performed by: NURSE ANESTHETIST, CERTIFIED REGISTERED

## 2021-03-05 RX ORDER — PROPOFOL 10 MG/ML
VIAL (ML) INTRAVENOUS
Status: DISCONTINUED | OUTPATIENT
Start: 2021-03-05 | End: 2021-03-05

## 2021-03-05 RX ORDER — SODIUM CHLORIDE 9 MG/ML
INJECTION, SOLUTION INTRAVENOUS CONTINUOUS
Status: DISCONTINUED | OUTPATIENT
Start: 2021-03-05 | End: 2021-03-05 | Stop reason: HOSPADM

## 2021-03-05 RX ORDER — LIDOCAINE HCL/PF 100 MG/5ML
SYRINGE (ML) INTRAVENOUS
Status: DISCONTINUED | OUTPATIENT
Start: 2021-03-05 | End: 2021-03-05

## 2021-03-05 RX ADMIN — PROPOFOL 30 MG: 10 INJECTION, EMULSION INTRAVENOUS at 01:03

## 2021-03-05 RX ADMIN — SODIUM CHLORIDE: 0.9 INJECTION, SOLUTION INTRAVENOUS at 12:03

## 2021-03-05 RX ADMIN — PROPOFOL 50 MG: 10 INJECTION, EMULSION INTRAVENOUS at 01:03

## 2021-03-05 RX ADMIN — LIDOCAINE HYDROCHLORIDE 50 MG: 20 INJECTION INTRAVENOUS at 12:03

## 2021-03-05 RX ADMIN — PROPOFOL 150 MG: 10 INJECTION, EMULSION INTRAVENOUS at 12:03

## 2021-03-08 VITALS
SYSTOLIC BLOOD PRESSURE: 145 MMHG | HEART RATE: 58 BPM | RESPIRATION RATE: 16 BRPM | OXYGEN SATURATION: 95 % | BODY MASS INDEX: 28.44 KG/M2 | TEMPERATURE: 98 F | WEIGHT: 210 LBS | DIASTOLIC BLOOD PRESSURE: 77 MMHG | HEIGHT: 72 IN

## 2021-03-12 LAB
FINAL PATHOLOGIC DIAGNOSIS: NORMAL
GROSS: NORMAL
Lab: NORMAL

## 2021-03-15 ENCOUNTER — TELEPHONE (OUTPATIENT)
Dept: GASTROENTEROLOGY | Facility: CLINIC | Age: 71
End: 2021-03-15

## 2021-03-16 ENCOUNTER — HOSPITAL ENCOUNTER (EMERGENCY)
Facility: HOSPITAL | Age: 71
Discharge: HOME | End: 2021-03-16
Attending: EMERGENCY MEDICINE
Payer: MEDICARE

## 2021-03-16 ENCOUNTER — TELEPHONE (OUTPATIENT)
Dept: GASTROENTEROLOGY | Facility: CLINIC | Age: 71
End: 2021-03-16

## 2021-03-16 VITALS
HEIGHT: 72 IN | HEART RATE: 84 BPM | OXYGEN SATURATION: 94 % | RESPIRATION RATE: 20 BRPM | WEIGHT: 210.56 LBS | DIASTOLIC BLOOD PRESSURE: 67 MMHG | BODY MASS INDEX: 28.52 KG/M2 | SYSTOLIC BLOOD PRESSURE: 143 MMHG | TEMPERATURE: 98 F

## 2021-03-16 DIAGNOSIS — R11.10 VOMITING AND DIARRHEA: Primary | ICD-10-CM

## 2021-03-16 DIAGNOSIS — R10.13 EPIGASTRIC PAIN: ICD-10-CM

## 2021-03-16 DIAGNOSIS — R19.7 VOMITING AND DIARRHEA: Primary | ICD-10-CM

## 2021-03-16 LAB
ALBUMIN SERPL BCP-MCNC: 4 G/DL (ref 3.5–5.2)
ALP SERPL-CCNC: 112 U/L (ref 55–135)
ALT SERPL W/O P-5'-P-CCNC: 30 U/L (ref 10–44)
ANION GAP SERPL CALC-SCNC: 14 MMOL/L (ref 8–16)
AST SERPL-CCNC: 22 U/L (ref 10–40)
BASOPHILS # BLD AUTO: 0.03 K/UL (ref 0–0.2)
BASOPHILS NFR BLD: 0.5 % (ref 0–1.9)
BILIRUB SERPL-MCNC: 0.9 MG/DL (ref 0.1–1)
BUN SERPL-MCNC: 31 MG/DL (ref 8–23)
CALCIUM SERPL-MCNC: 9 MG/DL (ref 8.7–10.5)
CHLORIDE SERPL-SCNC: 105 MMOL/L (ref 95–110)
CO2 SERPL-SCNC: 23 MMOL/L (ref 23–29)
CREAT SERPL-MCNC: 1.1 MG/DL (ref 0.5–1.4)
DIFFERENTIAL METHOD: ABNORMAL
EOSINOPHIL # BLD AUTO: 0.2 K/UL (ref 0–0.5)
EOSINOPHIL NFR BLD: 3.1 % (ref 0–8)
ERYTHROCYTE [DISTWIDTH] IN BLOOD BY AUTOMATED COUNT: 14.7 % (ref 11.5–14.5)
EST. GFR  (AFRICAN AMERICAN): >60 ML/MIN/1.73 M^2
EST. GFR  (NON AFRICAN AMERICAN): >60 ML/MIN/1.73 M^2
GLUCOSE SERPL-MCNC: 139 MG/DL (ref 70–110)
HCT VFR BLD AUTO: 45.3 % (ref 40–54)
HGB BLD-MCNC: 14.6 G/DL (ref 14–18)
IMM GRANULOCYTES # BLD AUTO: 0.02 K/UL (ref 0–0.04)
IMM GRANULOCYTES NFR BLD AUTO: 0.3 % (ref 0–0.5)
LIPASE SERPL-CCNC: 11 U/L (ref 4–60)
LYMPHOCYTES # BLD AUTO: 0.9 K/UL (ref 1–4.8)
LYMPHOCYTES NFR BLD: 14.6 % (ref 18–48)
MCH RBC QN AUTO: 28.3 PG (ref 27–31)
MCHC RBC AUTO-ENTMCNC: 32.2 G/DL (ref 32–36)
MCV RBC AUTO: 88 FL (ref 82–98)
MONOCYTES # BLD AUTO: 0.5 K/UL (ref 0.3–1)
MONOCYTES NFR BLD: 8.7 % (ref 4–15)
NEUTROPHILS # BLD AUTO: 4.4 K/UL (ref 1.8–7.7)
NEUTROPHILS NFR BLD: 72.8 % (ref 38–73)
NRBC BLD-RTO: 0 /100 WBC
PLATELET # BLD AUTO: 176 K/UL (ref 150–350)
PMV BLD AUTO: 10.1 FL (ref 9.2–12.9)
POTASSIUM SERPL-SCNC: 4.4 MMOL/L (ref 3.5–5.1)
PROT SERPL-MCNC: 7.8 G/DL (ref 6–8.4)
RBC # BLD AUTO: 5.15 M/UL (ref 4.6–6.2)
SODIUM SERPL-SCNC: 142 MMOL/L (ref 136–145)
WBC # BLD AUTO: 6.08 K/UL (ref 3.9–12.7)

## 2021-03-16 PROCEDURE — 96375 TX/PRO/DX INJ NEW DRUG ADDON: CPT

## 2021-03-16 PROCEDURE — 93010 ELECTROCARDIOGRAM REPORT: CPT | Mod: ,,, | Performed by: SPECIALIST

## 2021-03-16 PROCEDURE — 85025 COMPLETE CBC W/AUTO DIFF WBC: CPT | Performed by: EMERGENCY MEDICINE

## 2021-03-16 PROCEDURE — 36415 COLL VENOUS BLD VENIPUNCTURE: CPT | Performed by: EMERGENCY MEDICINE

## 2021-03-16 PROCEDURE — 25000003 PHARM REV CODE 250: Performed by: EMERGENCY MEDICINE

## 2021-03-16 PROCEDURE — 80053 COMPREHEN METABOLIC PANEL: CPT | Performed by: EMERGENCY MEDICINE

## 2021-03-16 PROCEDURE — 63600175 PHARM REV CODE 636 W HCPCS: Performed by: EMERGENCY MEDICINE

## 2021-03-16 PROCEDURE — 93010 EKG 12-LEAD: ICD-10-PCS | Mod: ,,, | Performed by: SPECIALIST

## 2021-03-16 PROCEDURE — 83690 ASSAY OF LIPASE: CPT | Performed by: EMERGENCY MEDICINE

## 2021-03-16 PROCEDURE — 96361 HYDRATE IV INFUSION ADD-ON: CPT

## 2021-03-16 PROCEDURE — 93005 ELECTROCARDIOGRAM TRACING: CPT

## 2021-03-16 PROCEDURE — 96374 THER/PROPH/DIAG INJ IV PUSH: CPT

## 2021-03-16 PROCEDURE — 99284 EMERGENCY DEPT VISIT MOD MDM: CPT | Mod: 25

## 2021-03-16 RX ORDER — SYRING-NEEDL,DISP,INSUL,0.3 ML 29 G X1/2"
296 SYRINGE, EMPTY DISPOSABLE MISCELLANEOUS DAILY PRN
COMMUNITY

## 2021-03-16 RX ORDER — ONDANSETRON 4 MG/1
4 TABLET, ORALLY DISINTEGRATING ORAL EVERY 8 HOURS PRN
Qty: 12 TABLET | Refills: 0 | Status: SHIPPED | OUTPATIENT
Start: 2021-03-16 | End: 2021-04-22

## 2021-03-16 RX ORDER — HYDROCODONE BITARTRATE AND ACETAMINOPHEN 5; 325 MG/1; MG/1
1 TABLET ORAL EVERY 6 HOURS PRN
Qty: 6 TABLET | Refills: 0 | Status: SHIPPED | OUTPATIENT
Start: 2021-03-16 | End: 2021-04-22

## 2021-03-16 RX ORDER — MORPHINE SULFATE 2 MG/ML
6 INJECTION, SOLUTION INTRAMUSCULAR; INTRAVENOUS
Status: COMPLETED | OUTPATIENT
Start: 2021-03-16 | End: 2021-03-16

## 2021-03-16 RX ORDER — ONDANSETRON 2 MG/ML
4 INJECTION INTRAMUSCULAR; INTRAVENOUS
Status: COMPLETED | OUTPATIENT
Start: 2021-03-16 | End: 2021-03-16

## 2021-03-16 RX ADMIN — SODIUM CHLORIDE 1000 ML: 0.9 INJECTION, SOLUTION INTRAVENOUS at 04:03

## 2021-03-16 RX ADMIN — MORPHINE SULFATE 6 MG: 2 INJECTION, SOLUTION INTRAMUSCULAR; INTRAVENOUS at 04:03

## 2021-03-16 RX ADMIN — ONDANSETRON 4 MG: 2 INJECTION INTRAMUSCULAR; INTRAVENOUS at 04:03

## 2021-03-17 ENCOUNTER — TELEPHONE (OUTPATIENT)
Dept: GASTROENTEROLOGY | Facility: CLINIC | Age: 71
End: 2021-03-17

## 2021-04-01 ENCOUNTER — HOSPITAL ENCOUNTER (OUTPATIENT)
Facility: HOSPITAL | Age: 71
Discharge: HOME | End: 2021-04-01
Attending: INTERNAL MEDICINE | Admitting: INTERNAL MEDICINE
Payer: MEDICARE

## 2021-04-01 VITALS
WEIGHT: 210 LBS | OXYGEN SATURATION: 97 % | HEIGHT: 72 IN | RESPIRATION RATE: 18 BRPM | SYSTOLIC BLOOD PRESSURE: 141 MMHG | HEART RATE: 65 BPM | TEMPERATURE: 98 F | BODY MASS INDEX: 28.44 KG/M2 | DIASTOLIC BLOOD PRESSURE: 73 MMHG

## 2021-04-01 DIAGNOSIS — R10.9 ABDOMINAL PAIN: ICD-10-CM

## 2021-04-01 DIAGNOSIS — D50.9 IRON DEFICIENCY ANEMIA, UNSPECIFIED IRON DEFICIENCY ANEMIA TYPE: Primary | ICD-10-CM

## 2021-04-01 PROCEDURE — 91110 GI TRC IMG INTRAL ESOPH-ILE: CPT | Mod: 26,,, | Performed by: INTERNAL MEDICINE

## 2021-04-01 PROCEDURE — 91110 PR GI TRACT CAPSULE ENDOSCOPY: ICD-10-PCS | Mod: 26,,, | Performed by: INTERNAL MEDICINE

## 2021-04-01 PROCEDURE — 25000003 PHARM REV CODE 250: Performed by: INTERNAL MEDICINE

## 2021-04-01 PROCEDURE — 91110 GI TRC IMG INTRAL ESOPH-ILE: CPT | Performed by: INTERNAL MEDICINE

## 2021-04-01 RX ORDER — DEXTROMETHORPHAN/PSEUDOEPHED 2.5-7.5/.8
40 DROPS ORAL ONCE
Status: COMPLETED | OUTPATIENT
Start: 2021-04-01 | End: 2021-04-01

## 2021-04-01 RX ORDER — DEXTROMETHORPHAN/PSEUDOEPHED 2.5-7.5/.8
DROPS ORAL
Status: COMPLETED | OUTPATIENT
Start: 2021-04-01 | End: 2021-04-01

## 2021-04-01 RX ADMIN — SIMETHICONE 40 MG: 20 SUSPENSION/ DROPS ORAL at 07:04

## 2021-04-13 ENCOUNTER — LAB VISIT (OUTPATIENT)
Dept: LAB | Facility: HOSPITAL | Age: 71
End: 2021-04-13
Attending: INTERNAL MEDICINE
Payer: MEDICARE

## 2021-04-13 DIAGNOSIS — E11.9 TYPE 2 DIABETES MELLITUS WITHOUT COMPLICATION, WITHOUT LONG-TERM CURRENT USE OF INSULIN: ICD-10-CM

## 2021-04-13 DIAGNOSIS — E78.5 HYPERLIPIDEMIA, UNSPECIFIED HYPERLIPIDEMIA TYPE: ICD-10-CM

## 2021-04-13 LAB
ALBUMIN SERPL BCP-MCNC: 4.3 G/DL (ref 3.5–5.2)
ALP SERPL-CCNC: 105 U/L (ref 55–135)
ALT SERPL W/O P-5'-P-CCNC: 29 U/L (ref 10–44)
ANION GAP SERPL CALC-SCNC: 11 MMOL/L (ref 8–16)
AST SERPL-CCNC: 28 U/L (ref 10–40)
BILIRUB SERPL-MCNC: 0.7 MG/DL (ref 0.1–1)
BUN SERPL-MCNC: 26 MG/DL (ref 8–23)
CALCIUM SERPL-MCNC: 8.8 MG/DL (ref 8.7–10.5)
CHLORIDE SERPL-SCNC: 103 MMOL/L (ref 95–110)
CHOLEST SERPL-MCNC: 127 MG/DL (ref 120–199)
CHOLEST/HDLC SERPL: 2.9 {RATIO} (ref 2–5)
CO2 SERPL-SCNC: 24 MMOL/L (ref 23–29)
CREAT SERPL-MCNC: 1.1 MG/DL (ref 0.5–1.4)
EST. GFR  (AFRICAN AMERICAN): >60 ML/MIN/1.73 M^2
EST. GFR  (NON AFRICAN AMERICAN): >60 ML/MIN/1.73 M^2
ESTIMATED AVG GLUCOSE: 163 MG/DL (ref 68–131)
GLUCOSE SERPL-MCNC: 150 MG/DL (ref 70–110)
HBA1C MFR BLD: 7.3 % (ref 4.5–6.2)
HDLC SERPL-MCNC: 44 MG/DL (ref 40–75)
HDLC SERPL: 34.6 % (ref 20–50)
LDLC SERPL CALC-MCNC: 63.8 MG/DL (ref 63–159)
NONHDLC SERPL-MCNC: 83 MG/DL
POTASSIUM SERPL-SCNC: 4.4 MMOL/L (ref 3.5–5.1)
PROT SERPL-MCNC: 8.1 G/DL (ref 6–8.4)
SODIUM SERPL-SCNC: 138 MMOL/L (ref 136–145)
TRIGL SERPL-MCNC: 96 MG/DL (ref 30–150)

## 2021-04-13 PROCEDURE — 83036 HEMOGLOBIN GLYCOSYLATED A1C: CPT | Performed by: INTERNAL MEDICINE

## 2021-04-13 PROCEDURE — 80053 COMPREHEN METABOLIC PANEL: CPT | Performed by: INTERNAL MEDICINE

## 2021-04-13 PROCEDURE — 36415 COLL VENOUS BLD VENIPUNCTURE: CPT | Performed by: INTERNAL MEDICINE

## 2021-04-13 PROCEDURE — 80061 LIPID PANEL: CPT | Performed by: INTERNAL MEDICINE

## 2021-04-14 ENCOUNTER — TELEPHONE (OUTPATIENT)
Dept: GASTROENTEROLOGY | Facility: CLINIC | Age: 71
End: 2021-04-14

## 2021-04-22 ENCOUNTER — OFFICE VISIT (OUTPATIENT)
Dept: INTERNAL MEDICINE | Facility: CLINIC | Age: 71
End: 2021-04-22
Payer: MEDICARE

## 2021-04-22 VITALS
WEIGHT: 214.31 LBS | SYSTOLIC BLOOD PRESSURE: 125 MMHG | BODY MASS INDEX: 29.03 KG/M2 | RESPIRATION RATE: 18 BRPM | HEART RATE: 63 BPM | OXYGEN SATURATION: 99 % | HEIGHT: 72 IN | DIASTOLIC BLOOD PRESSURE: 70 MMHG

## 2021-04-22 DIAGNOSIS — N18.31 STAGE 3A CHRONIC KIDNEY DISEASE: ICD-10-CM

## 2021-04-22 DIAGNOSIS — E11.9 TYPE 2 DIABETES MELLITUS WITHOUT COMPLICATION, WITHOUT LONG-TERM CURRENT USE OF INSULIN: ICD-10-CM

## 2021-04-22 DIAGNOSIS — E78.5 HYPERLIPIDEMIA, UNSPECIFIED HYPERLIPIDEMIA TYPE: ICD-10-CM

## 2021-04-22 DIAGNOSIS — E11.49 TYPE II DIABETES MELLITUS WITH NEUROLOGICAL MANIFESTATIONS: Primary | ICD-10-CM

## 2021-04-22 DIAGNOSIS — F41.9 ANXIETY: ICD-10-CM

## 2021-04-22 DIAGNOSIS — R10.9 ABDOMINAL PAIN, UNSPECIFIED ABDOMINAL LOCATION: ICD-10-CM

## 2021-04-22 DIAGNOSIS — I73.9 PERIPHERAL VASCULAR DISEASE, UNSPECIFIED: ICD-10-CM

## 2021-04-22 DIAGNOSIS — K83.8 BILIARY SLUDGE: ICD-10-CM

## 2021-04-22 PROCEDURE — 99214 OFFICE O/P EST MOD 30 MIN: CPT | Mod: S$PBB,,, | Performed by: INTERNAL MEDICINE

## 2021-04-22 PROCEDURE — 99214 PR OFFICE/OUTPT VISIT, EST, LEVL IV, 30-39 MIN: ICD-10-PCS | Mod: S$PBB,,, | Performed by: INTERNAL MEDICINE

## 2021-04-22 PROCEDURE — 99999 PR PBB SHADOW E&M-EST. PATIENT-LVL III: ICD-10-PCS | Mod: PBBFAC,,, | Performed by: INTERNAL MEDICINE

## 2021-04-22 PROCEDURE — 99213 OFFICE O/P EST LOW 20 MIN: CPT | Mod: PBBFAC | Performed by: INTERNAL MEDICINE

## 2021-04-22 PROCEDURE — 99999 PR PBB SHADOW E&M-EST. PATIENT-LVL III: CPT | Mod: PBBFAC,,, | Performed by: INTERNAL MEDICINE

## 2021-04-22 RX ORDER — GABAPENTIN 300 MG/1
300 CAPSULE ORAL 2 TIMES DAILY
Qty: 180 CAPSULE | Refills: 11 | Status: SHIPPED | OUTPATIENT
Start: 2021-04-22 | End: 2021-10-14 | Stop reason: SDUPTHER

## 2021-04-22 RX ORDER — METFORMIN HYDROCHLORIDE 500 MG/1
1000 TABLET ORAL 2 TIMES DAILY WITH MEALS
Qty: 360 TABLET | Refills: 3 | Status: SHIPPED | OUTPATIENT
Start: 2021-04-22 | End: 2022-05-10

## 2021-04-22 RX ORDER — DULOXETIN HYDROCHLORIDE 60 MG/1
60 CAPSULE, DELAYED RELEASE ORAL DAILY
Qty: 90 CAPSULE | Refills: 3 | Status: SHIPPED | OUTPATIENT
Start: 2021-04-22 | End: 2022-05-05

## 2021-04-22 RX ORDER — PANTOPRAZOLE SODIUM 40 MG/1
40 TABLET, DELAYED RELEASE ORAL DAILY
Qty: 90 TABLET | Refills: 3 | Status: SHIPPED | OUTPATIENT
Start: 2021-04-22 | End: 2022-05-31

## 2021-04-22 RX ORDER — ONDANSETRON 4 MG/1
TABLET, FILM COATED ORAL
COMMUNITY
Start: 2021-03-17 | End: 2022-02-03

## 2021-04-22 RX ORDER — ATORVASTATIN CALCIUM 40 MG/1
40 TABLET, FILM COATED ORAL DAILY
Qty: 90 TABLET | Refills: 3 | Status: SHIPPED | OUTPATIENT
Start: 2021-04-22 | End: 2022-01-10

## 2021-04-22 RX ORDER — ALLOPURINOL 100 MG/1
TABLET ORAL
Qty: 90 TABLET | Refills: 3 | Status: SHIPPED | OUTPATIENT
Start: 2021-04-22 | End: 2022-05-13

## 2021-04-22 RX ORDER — ALPRAZOLAM 0.5 MG/1
0.5 TABLET ORAL 3 TIMES DAILY
Qty: 15 TABLET | Refills: 2 | Status: SHIPPED | OUTPATIENT
Start: 2021-04-22 | End: 2021-07-12

## 2021-04-22 RX ORDER — AMLODIPINE BESYLATE 10 MG/1
10 TABLET ORAL DAILY
Qty: 90 TABLET | Refills: 3 | Status: SHIPPED | OUTPATIENT
Start: 2021-04-22 | End: 2022-02-14

## 2021-04-22 RX ORDER — GLIMEPIRIDE 4 MG/1
TABLET ORAL
Qty: 180 TABLET | Refills: 9 | Status: SHIPPED | OUTPATIENT
Start: 2021-04-22 | End: 2021-12-16

## 2021-05-14 ENCOUNTER — TELEPHONE (OUTPATIENT)
Dept: INTERNAL MEDICINE | Facility: CLINIC | Age: 71
End: 2021-05-14

## 2021-05-14 DIAGNOSIS — M54.9 BACK PAIN, UNSPECIFIED BACK LOCATION, UNSPECIFIED BACK PAIN LATERALITY, UNSPECIFIED CHRONICITY: Primary | ICD-10-CM

## 2021-06-09 ENCOUNTER — TELEPHONE (OUTPATIENT)
Dept: INTERNAL MEDICINE | Facility: CLINIC | Age: 71
End: 2021-06-09

## 2021-10-06 ENCOUNTER — LAB VISIT (OUTPATIENT)
Dept: LAB | Facility: HOSPITAL | Age: 71
End: 2021-10-06
Attending: INTERNAL MEDICINE
Payer: MEDICARE

## 2021-10-06 DIAGNOSIS — E78.5 HYPERLIPIDEMIA, UNSPECIFIED HYPERLIPIDEMIA TYPE: ICD-10-CM

## 2021-10-06 DIAGNOSIS — E11.49 TYPE II DIABETES MELLITUS WITH NEUROLOGICAL MANIFESTATIONS: ICD-10-CM

## 2021-10-06 LAB
ALBUMIN SERPL BCP-MCNC: 3.8 G/DL (ref 3.5–5.2)
ALP SERPL-CCNC: 97 U/L (ref 55–135)
ALT SERPL W/O P-5'-P-CCNC: 32 U/L (ref 10–44)
ANION GAP SERPL CALC-SCNC: 11 MMOL/L (ref 8–16)
AST SERPL-CCNC: 29 U/L (ref 10–40)
BILIRUB SERPL-MCNC: 0.8 MG/DL (ref 0.1–1)
BUN SERPL-MCNC: 29 MG/DL (ref 8–23)
CALCIUM SERPL-MCNC: 9.6 MG/DL (ref 8.7–10.5)
CHLORIDE SERPL-SCNC: 106 MMOL/L (ref 95–110)
CHOLEST SERPL-MCNC: 124 MG/DL (ref 120–199)
CHOLEST/HDLC SERPL: 3.3 {RATIO} (ref 2–5)
CO2 SERPL-SCNC: 20 MMOL/L (ref 23–29)
CREAT SERPL-MCNC: 1.2 MG/DL (ref 0.5–1.4)
EST. GFR  (AFRICAN AMERICAN): >60 ML/MIN/1.73 M^2
EST. GFR  (NON AFRICAN AMERICAN): >60 ML/MIN/1.73 M^2
ESTIMATED AVG GLUCOSE: 174 MG/DL (ref 68–131)
GLUCOSE SERPL-MCNC: 141 MG/DL (ref 70–110)
HBA1C MFR BLD: 7.7 % (ref 4–5.6)
HDLC SERPL-MCNC: 38 MG/DL (ref 40–75)
HDLC SERPL: 30.6 % (ref 20–50)
LDLC SERPL CALC-MCNC: 57 MG/DL (ref 63–159)
NONHDLC SERPL-MCNC: 86 MG/DL
POTASSIUM SERPL-SCNC: 4.6 MMOL/L (ref 3.5–5.1)
PROT SERPL-MCNC: 7.5 G/DL (ref 6–8.4)
SODIUM SERPL-SCNC: 137 MMOL/L (ref 136–145)
TRIGL SERPL-MCNC: 145 MG/DL (ref 30–150)

## 2021-10-06 PROCEDURE — 80053 COMPREHEN METABOLIC PANEL: CPT | Performed by: INTERNAL MEDICINE

## 2021-10-06 PROCEDURE — 80061 LIPID PANEL: CPT | Performed by: INTERNAL MEDICINE

## 2021-10-06 PROCEDURE — 36415 COLL VENOUS BLD VENIPUNCTURE: CPT | Performed by: INTERNAL MEDICINE

## 2021-10-06 PROCEDURE — 83036 HEMOGLOBIN GLYCOSYLATED A1C: CPT | Performed by: INTERNAL MEDICINE

## 2021-10-14 ENCOUNTER — OFFICE VISIT (OUTPATIENT)
Dept: INTERNAL MEDICINE | Facility: CLINIC | Age: 71
End: 2021-10-14
Payer: MEDICARE

## 2021-10-14 VITALS
SYSTOLIC BLOOD PRESSURE: 131 MMHG | OXYGEN SATURATION: 96 % | DIASTOLIC BLOOD PRESSURE: 70 MMHG | HEIGHT: 72 IN | WEIGHT: 215.63 LBS | HEART RATE: 72 BPM | BODY MASS INDEX: 29.21 KG/M2

## 2021-10-14 DIAGNOSIS — N18.31 STAGE 3A CHRONIC KIDNEY DISEASE: ICD-10-CM

## 2021-10-14 DIAGNOSIS — E11.9 TYPE 2 DIABETES MELLITUS WITHOUT COMPLICATION, WITHOUT LONG-TERM CURRENT USE OF INSULIN: ICD-10-CM

## 2021-10-14 DIAGNOSIS — Z96.653 STATUS POST TOTAL BILATERAL KNEE REPLACEMENT: ICD-10-CM

## 2021-10-14 DIAGNOSIS — E11.49 TYPE II DIABETES MELLITUS WITH NEUROLOGICAL MANIFESTATIONS: Primary | ICD-10-CM

## 2021-10-14 DIAGNOSIS — Z12.5 SCREENING PSA (PROSTATE SPECIFIC ANTIGEN): ICD-10-CM

## 2021-10-14 DIAGNOSIS — F32.A DEPRESSION, UNSPECIFIED DEPRESSION TYPE: ICD-10-CM

## 2021-10-14 PROBLEM — R11.2 INTRACTABLE NAUSEA AND VOMITING: Status: RESOLVED | Noted: 2018-06-21 | Resolved: 2021-10-14

## 2021-10-14 PROCEDURE — 99999 PR PBB SHADOW E&M-EST. PATIENT-LVL III: CPT | Mod: PBBFAC,,, | Performed by: INTERNAL MEDICINE

## 2021-10-14 PROCEDURE — 99214 PR OFFICE/OUTPT VISIT, EST, LEVL IV, 30-39 MIN: ICD-10-PCS | Mod: S$PBB,,, | Performed by: INTERNAL MEDICINE

## 2021-10-14 PROCEDURE — 99999 PR PBB SHADOW E&M-EST. PATIENT-LVL III: ICD-10-PCS | Mod: PBBFAC,,, | Performed by: INTERNAL MEDICINE

## 2021-10-14 PROCEDURE — 99214 OFFICE O/P EST MOD 30 MIN: CPT | Mod: S$PBB,,, | Performed by: INTERNAL MEDICINE

## 2021-10-14 PROCEDURE — 99213 OFFICE O/P EST LOW 20 MIN: CPT | Mod: PBBFAC | Performed by: INTERNAL MEDICINE

## 2021-10-14 RX ORDER — DULAGLUTIDE 0.75 MG/.5ML
0.75 INJECTION, SOLUTION SUBCUTANEOUS
Qty: 12 PEN | Refills: 1 | Status: SHIPPED | OUTPATIENT
Start: 2021-10-14 | End: 2022-03-18

## 2021-10-14 RX ORDER — GABAPENTIN 300 MG/1
300 CAPSULE ORAL 2 TIMES DAILY
Qty: 180 CAPSULE | Refills: 11 | Status: SHIPPED | OUTPATIENT
Start: 2021-10-14 | End: 2022-11-14

## 2021-10-18 ENCOUNTER — TELEPHONE (OUTPATIENT)
Dept: INTERNAL MEDICINE | Facility: CLINIC | Age: 71
End: 2021-10-18

## 2021-10-19 ENCOUNTER — TELEPHONE (OUTPATIENT)
Dept: INTERNAL MEDICINE | Facility: CLINIC | Age: 71
End: 2021-10-19

## 2021-10-19 DIAGNOSIS — R11.2 NAUSEA AND VOMITING, INTRACTABILITY OF VOMITING NOT SPECIFIED, UNSPECIFIED VOMITING TYPE: ICD-10-CM

## 2021-10-22 ENCOUNTER — TELEPHONE (OUTPATIENT)
Dept: INTERNAL MEDICINE | Facility: CLINIC | Age: 71
End: 2021-10-22

## 2021-11-03 ENCOUNTER — HOSPITAL ENCOUNTER (OUTPATIENT)
Dept: RADIOLOGY | Facility: HOSPITAL | Age: 71
Discharge: HOME | End: 2021-11-03
Attending: INTERNAL MEDICINE
Payer: MEDICARE

## 2021-11-03 ENCOUNTER — TELEPHONE (OUTPATIENT)
Dept: INTERNAL MEDICINE | Facility: CLINIC | Age: 71
End: 2021-11-03
Payer: MEDICARE

## 2021-11-03 DIAGNOSIS — R11.2 NAUSEA AND VOMITING, INTRACTABILITY OF VOMITING NOT SPECIFIED, UNSPECIFIED VOMITING TYPE: ICD-10-CM

## 2021-11-03 DIAGNOSIS — R11.2 NAUSEA AND VOMITING, INTRACTABILITY OF VOMITING NOT SPECIFIED, UNSPECIFIED VOMITING TYPE: Primary | ICD-10-CM

## 2021-11-03 PROCEDURE — A9537 TC99M MEBROFENIN: HCPCS

## 2021-11-03 PROCEDURE — 78226 HEPATOBILIARY SYSTEM IMAGING: CPT | Mod: TC

## 2021-11-03 PROCEDURE — 78226 HEPATOBILIARY SYSTEM IMAGING: CPT | Mod: 26,,, | Performed by: RADIOLOGY

## 2021-11-03 PROCEDURE — 78226 NM HEPATOBILIARY IMAGING INC GB (HIDA): ICD-10-PCS | Mod: 26,,, | Performed by: RADIOLOGY

## 2021-11-08 ENCOUNTER — TELEPHONE (OUTPATIENT)
Dept: INTERNAL MEDICINE | Facility: CLINIC | Age: 71
End: 2021-11-08
Payer: MEDICARE

## 2021-11-11 ENCOUNTER — TELEPHONE (OUTPATIENT)
Dept: INTERNAL MEDICINE | Facility: CLINIC | Age: 71
End: 2021-11-11
Payer: MEDICARE

## 2021-11-16 ENCOUNTER — TELEPHONE (OUTPATIENT)
Dept: GASTROENTEROLOGY | Facility: CLINIC | Age: 71
End: 2021-11-16
Payer: MEDICARE

## 2021-11-16 ENCOUNTER — TELEPHONE (OUTPATIENT)
Dept: ENDOSCOPY | Facility: HOSPITAL | Age: 71
End: 2021-11-16
Payer: MEDICARE

## 2021-12-22 DIAGNOSIS — E11.9 TYPE 2 DIABETES MELLITUS WITHOUT COMPLICATION: ICD-10-CM

## 2021-12-30 ENCOUNTER — OFFICE VISIT (OUTPATIENT)
Dept: GASTROENTEROLOGY | Facility: CLINIC | Age: 71
End: 2021-12-30
Payer: MEDICARE

## 2021-12-30 VITALS
BODY MASS INDEX: 29.05 KG/M2 | HEIGHT: 72 IN | SYSTOLIC BLOOD PRESSURE: 122 MMHG | DIASTOLIC BLOOD PRESSURE: 70 MMHG | WEIGHT: 214.5 LBS | HEART RATE: 79 BPM

## 2021-12-30 DIAGNOSIS — R11.11 VOMITING WITHOUT NAUSEA, INTRACTABILITY OF VOMITING NOT SPECIFIED, UNSPECIFIED VOMITING TYPE: Primary | ICD-10-CM

## 2021-12-30 DIAGNOSIS — R11.2 NAUSEA AND VOMITING, INTRACTABILITY OF VOMITING NOT SPECIFIED, UNSPECIFIED VOMITING TYPE: ICD-10-CM

## 2021-12-30 PROCEDURE — 99999 PR PBB SHADOW E&M-EST. PATIENT-LVL IV: CPT | Mod: PBBFAC,,, | Performed by: INTERNAL MEDICINE

## 2021-12-30 PROCEDURE — 99999 PR PBB SHADOW E&M-EST. PATIENT-LVL IV: ICD-10-PCS | Mod: PBBFAC,,, | Performed by: INTERNAL MEDICINE

## 2021-12-30 PROCEDURE — 99213 PR OFFICE/OUTPT VISIT, EST, LEVL III, 20-29 MIN: ICD-10-PCS | Mod: S$PBB,,, | Performed by: INTERNAL MEDICINE

## 2021-12-30 PROCEDURE — 99213 OFFICE O/P EST LOW 20 MIN: CPT | Mod: S$PBB,,, | Performed by: INTERNAL MEDICINE

## 2021-12-30 PROCEDURE — 99214 OFFICE O/P EST MOD 30 MIN: CPT | Mod: PBBFAC | Performed by: INTERNAL MEDICINE

## 2022-01-07 NOTE — TELEPHONE ENCOUNTER
No new care gaps identified.  Powered by [employer]. Reference number: [id number].   1/07/2022 10:16:44 AM CST

## 2022-01-10 RX ORDER — ATORVASTATIN CALCIUM 40 MG/1
TABLET, FILM COATED ORAL
Qty: 90 TABLET | Refills: 3 | Status: SHIPPED | OUTPATIENT
Start: 2022-01-10 | End: 2023-02-13

## 2022-01-10 NOTE — TELEPHONE ENCOUNTER
Refill Authorization Note   Ellis Hatch  is requesting a refill authorization.  Brief Assessment and Rationale for Refill:  Approve     Medication Therapy Plan:       Medication Reconciliation Completed: No   Comments:   --->Care Gap information included below if applicable.   Orders Placed This Encounter    atorvastatin (LIPITOR) 40 MG tablet      Requested Prescriptions   Signed Prescriptions Disp Refills    atorvastatin (LIPITOR) 40 MG tablet 90 tablet 3     Sig: TAKE 1 TABLET BY MOUTH ONCE DAILY.       Cardiovascular:  Antilipid - Statins Passed - 1/7/2022 10:16 AM        Passed - Patient is at least 18 years old        Passed - Valid encounter within last 15 months     Recent Visits  Date Type Provider Dept   10/14/21 Office Visit Eddie Delaney Jr., MD [facility]   04/22/21 Office Visit Eddie Delaney Jr., MD [facility]   10/22/20 Office Visit Eddie Delaney Jr., MD [facility]   06/18/20 Office Visit Eddie Delaney Jr., MD [facility]   Showing recent visits within past 720 days and meeting all other requirements  Future Appointments  No visits were found meeting these conditions.  Showing future appointments within next 150 days and meeting all other requirements      Future Appointments              In 1 month LAB, [facility], [facility]    In 1 month MD Bart Art Jr. [facility], [facility]                Passed - ALT is 131 or below and within 360 days     ALT   Date Value Ref Range Status   10/06/2021 32 10 - 44 U/L Final   04/13/2021 29 10 - 44 U/L Final   03/16/2021 30 10 - 44 U/L Final              Passed - AST is 119 or below and within 360 days     AST   Date Value Ref Range Status   10/06/2021 29 10 - 40 U/L Final   04/13/2021 28 10 - 40 U/L Final   03/16/2021 22 10 - 40 U/L Final              Passed - Total Cholesterol within 360 days     Lab Results   Component Value Date    CHOL 124 10/06/2021     CHOL 127 04/13/2021    CHOL 87 (L) 02/13/2021              Passed - LDL within 360 days     LDL Cholesterol   Date Value Ref Range Status   10/06/2021 57.0 (L) 63.0 - 159.0 mg/dL Final     Comment:     The National Cholesterol Education Program (NCEP) has set the  following guidelines (reference values) for LDL Cholesterol:  Optimal.......................<130 mg/dL  Borderline High...............130-159 mg/dL  High..........................160-189 mg/dL  Very High.....................>190 mg/dL              Passed - HDL within 360 days     HDL   Date Value Ref Range Status   10/06/2021 38 (L) 40 - 75 mg/dL Final     Comment:     The National Cholesterol Education Program (NCEP) has set the  following guidelines (reference values) for HDL Cholesterol:  Low...............<40 mg/dL  Optimal...........>60 mg/dL              Passed - Triglycerides within 360 days     Lab Results   Component Value Date    TRIG 145 10/06/2021    TRIG 96 04/13/2021    TRIG 76 02/13/2021                  Appointments  past 12m or future 3m with PCP    Date Provider   Last Visit   10/14/2021 Eddie Delaney Jr., MD   Next Visit   2/17/2022 Eddie Delaney Jr., MD   ED visits in past 90 days: 0     Note composed:1:56 PM 01/10/2022

## 2022-01-25 NOTE — TELEPHONE ENCOUNTER
Care Due:                  Date            Visit Type   Department     Provider  --------------------------------------------------------------------------------                                             [facility] INTERNAL  Last Visit: 10-      [facility]       Eddie Delaney                                           [facility] INTER[facility]  Next Visit: 02-      None         LUCIA Delaney                                                            Last  Test          Frequency    Reason                     Performed    Due Date  --------------------------------------------------------------------------------    HBA1C.......  6 months...  dulaglutide, glimepiride,   10-   04-                             metFORMIN................    Powered by [employer]. Reference number: [id number].   1/25/2022 11:05:07 AM CST

## 2022-01-26 DIAGNOSIS — E11.9 TYPE 2 DIABETES MELLITUS WITHOUT COMPLICATION, UNSPECIFIED WHETHER LONG TERM INSULIN USE: ICD-10-CM

## 2022-01-26 NOTE — TELEPHONE ENCOUNTER
----- Message from Ashely Cano sent at 1/26/2022  1:02 PM CST -----  Contact: self/[phone]  Requesting an RX refill or new RX.  Is this a refill or new RX: refill  RX name and strength (copy/paste from chart):  ALPRAZolam (XANAX) 0.5 MG tablet 15 tablet 2 10/1/2021   Sig: TAKE 1 TABLET BY MOUTH 3 TIMES A DAY  Is this a 30 day or 90 day RX: 30  Patient advised that in the future they can use their [facility] account to request a refill?:  call back  Patient advised that RX refills can take up to 72 hours?:  yes  Pharmacy name and phone # (copy/paste from chart):    [facility] - [address] [address]  Phone: [phone] Fax: [phone]  Comments:     Please advise

## 2022-01-27 RX ORDER — ALPRAZOLAM 0.5 MG/1
TABLET ORAL
Qty: 15 TABLET | Refills: 2 | Status: SHIPPED | OUTPATIENT
Start: 2022-01-27 | End: 2022-05-11

## 2022-02-02 NOTE — TELEPHONE ENCOUNTER
No new care gaps identified.  Powered by [employer]. Reference number: [id number].   2/02/2022 12:04:08 PM CST

## 2022-02-03 ENCOUNTER — TELEPHONE (OUTPATIENT)
Dept: GASTROENTEROLOGY | Facility: HOSPITAL | Age: 72
End: 2022-02-03
Payer: MEDICARE

## 2022-02-03 ENCOUNTER — HOSPITAL ENCOUNTER (EMERGENCY)
Facility: HOSPITAL | Age: 72
Discharge: HOME | End: 2022-02-03
Attending: EMERGENCY MEDICINE
Payer: MEDICARE

## 2022-02-03 VITALS
TEMPERATURE: 99 F | SYSTOLIC BLOOD PRESSURE: 121 MMHG | WEIGHT: 210 LBS | OXYGEN SATURATION: 95 % | RESPIRATION RATE: 18 BRPM | HEART RATE: 82 BPM | DIASTOLIC BLOOD PRESSURE: 59 MMHG | HEIGHT: 72 IN | BODY MASS INDEX: 28.44 KG/M2

## 2022-02-03 DIAGNOSIS — K58.2 IRRITABLE BOWEL SYNDROME WITH BOTH CONSTIPATION AND DIARRHEA: ICD-10-CM

## 2022-02-03 DIAGNOSIS — R19.7 DIARRHEA, UNSPECIFIED TYPE: ICD-10-CM

## 2022-02-03 DIAGNOSIS — R19.7 DIARRHEA IN ADULT PATIENT: ICD-10-CM

## 2022-02-03 DIAGNOSIS — K86.89 ATROPHIC PANCREAS: Primary | ICD-10-CM

## 2022-02-03 DIAGNOSIS — R10.13 EPIGASTRIC PAIN: ICD-10-CM

## 2022-02-03 DIAGNOSIS — K80.20 CALCULUS OF GALLBLADDER WITHOUT CHOLECYSTITIS WITHOUT OBSTRUCTION: ICD-10-CM

## 2022-02-03 DIAGNOSIS — R10.84 GENERALIZED ABDOMINAL PAIN: ICD-10-CM

## 2022-02-03 DIAGNOSIS — R11.2 NAUSEA AND VOMITING, INTRACTABILITY OF VOMITING NOT SPECIFIED, UNSPECIFIED VOMITING TYPE: ICD-10-CM

## 2022-02-03 DIAGNOSIS — R11.2 NAUSEA AND VOMITING, INTRACTABILITY OF VOMITING NOT SPECIFIED, UNSPECIFIED VOMITING TYPE: Primary | ICD-10-CM

## 2022-02-03 LAB
BASOPHILS # BLD AUTO: 0.03 K/UL (ref 0–0.2)
BASOPHILS NFR BLD: 0.3 % (ref 0–1.9)
BUN SERPL-MCNC: 29 MG/DL (ref 6–30)
C4 SERPL-MCNC: 25 MG/DL (ref 11–44)
CHLORIDE SERPL-SCNC: 105 MMOL/L (ref 95–110)
CREAT SERPL-MCNC: 1 MG/DL (ref 0.5–1.4)
DIFFERENTIAL METHOD: ABNORMAL
EOSINOPHIL # BLD AUTO: 0.1 K/UL (ref 0–0.5)
EOSINOPHIL NFR BLD: 0.7 % (ref 0–8)
ERYTHROCYTE [DISTWIDTH] IN BLOOD BY AUTOMATED COUNT: 14.2 % (ref 11.5–14.5)
GLUCOSE SERPL-MCNC: 198 MG/DL (ref 70–110)
HCT VFR BLD AUTO: 43.4 % (ref 40–54)
HCT VFR BLD CALC: 43 %PCV (ref 36–54)
HCV AB SERPL QL IA: NEGATIVE
HGB BLD-MCNC: 14 G/DL (ref 14–18)
HIV 1+2 AB+HIV1 P24 AG SERPL QL IA: NEGATIVE
IMM GRANULOCYTES # BLD AUTO: 0.03 K/UL (ref 0–0.04)
IMM GRANULOCYTES NFR BLD AUTO: 0.3 % (ref 0–0.5)
LYMPHOCYTES # BLD AUTO: 1.5 K/UL (ref 1–4.8)
LYMPHOCYTES NFR BLD: 13.8 % (ref 18–48)
MCH RBC QN AUTO: 28.7 PG (ref 27–31)
MCHC RBC AUTO-ENTMCNC: 32.3 G/DL (ref 32–36)
MCV RBC AUTO: 89 FL (ref 82–98)
MONOCYTES # BLD AUTO: 0.8 K/UL (ref 0.3–1)
MONOCYTES NFR BLD: 7.4 % (ref 4–15)
NEUTROPHILS # BLD AUTO: 8.1 K/UL (ref 1.8–7.7)
NEUTROPHILS NFR BLD: 77.5 % (ref 38–73)
NRBC BLD-RTO: 0 /100 WBC
PLATELET # BLD AUTO: 200 K/UL (ref 150–450)
PMV BLD AUTO: 10.6 FL (ref 9.2–12.9)
POC IONIZED CALCIUM: 1.17 MMOL/L (ref 1.06–1.42)
POC TCO2 (MEASURED): 22 MMOL/L (ref 23–29)
POTASSIUM BLD-SCNC: 4.5 MMOL/L (ref 3.5–5.1)
RBC # BLD AUTO: 4.87 M/UL (ref 4.6–6.2)
SAMPLE: ABNORMAL
SODIUM BLD-SCNC: 140 MMOL/L (ref 136–145)
WBC # BLD AUTO: 10.48 K/UL (ref 3.9–12.7)

## 2022-02-03 PROCEDURE — 87427 SHIGA-LIKE TOXIN AG IA: CPT | Performed by: EMERGENCY MEDICINE

## 2022-02-03 PROCEDURE — 99284 EMERGENCY DEPT VISIT MOD MDM: CPT | Mod: GC,,, | Performed by: INTERNAL MEDICINE

## 2022-02-03 PROCEDURE — 99285 PR EMERGENCY DEPT VISIT,LEVEL V: ICD-10-PCS | Mod: ,,, | Performed by: EMERGENCY MEDICINE

## 2022-02-03 PROCEDURE — 86161 COMPLEMENT/FUNCTION ACTIVITY: CPT | Performed by: INTERNAL MEDICINE

## 2022-02-03 PROCEDURE — 99285 EMERGENCY DEPT VISIT HI MDM: CPT | Mod: 25

## 2022-02-03 PROCEDURE — 99284 PR EMERGENCY DEPT VISIT,LEVEL IV: ICD-10-PCS | Mod: GC,,, | Performed by: INTERNAL MEDICINE

## 2022-02-03 PROCEDURE — 96374 THER/PROPH/DIAG INJ IV PUSH: CPT

## 2022-02-03 PROCEDURE — 25500020 PHARM REV CODE 255: Performed by: EMERGENCY MEDICINE

## 2022-02-03 PROCEDURE — 86160 COMPLEMENT ANTIGEN: CPT | Mod: 59 | Performed by: INTERNAL MEDICINE

## 2022-02-03 PROCEDURE — 87046 STOOL CULTR AEROBIC BACT EA: CPT | Mod: 59 | Performed by: EMERGENCY MEDICINE

## 2022-02-03 PROCEDURE — 25000003 PHARM REV CODE 250: Performed by: EMERGENCY MEDICINE

## 2022-02-03 PROCEDURE — 87449 NOS EACH ORGANISM AG IA: CPT | Performed by: EMERGENCY MEDICINE

## 2022-02-03 PROCEDURE — 86803 HEPATITIS C AB TEST: CPT | Performed by: EMERGENCY MEDICINE

## 2022-02-03 PROCEDURE — 99285 EMERGENCY DEPT VISIT HI MDM: CPT | Mod: ,,, | Performed by: EMERGENCY MEDICINE

## 2022-02-03 PROCEDURE — 63600175 PHARM REV CODE 636 W HCPCS: Performed by: EMERGENCY MEDICINE

## 2022-02-03 PROCEDURE — 85025 COMPLETE CBC W/AUTO DIFF WBC: CPT | Performed by: EMERGENCY MEDICINE

## 2022-02-03 PROCEDURE — 87389 HIV-1 AG W/HIV-1&-2 AB AG IA: CPT | Performed by: EMERGENCY MEDICINE

## 2022-02-03 PROCEDURE — 86160 COMPLEMENT ANTIGEN: CPT | Performed by: INTERNAL MEDICINE

## 2022-02-03 PROCEDURE — 87045 FECES CULTURE AEROBIC BACT: CPT | Performed by: EMERGENCY MEDICINE

## 2022-02-03 PROCEDURE — 96361 HYDRATE IV INFUSION ADD-ON: CPT

## 2022-02-03 RX ORDER — ONDANSETRON 4 MG/1
4 TABLET, ORALLY DISINTEGRATING ORAL EVERY 6 HOURS PRN
Qty: 20 TABLET | Refills: 0 | Status: SHIPPED | OUTPATIENT
Start: 2022-02-03 | End: 2022-02-17

## 2022-02-03 RX ORDER — ONDANSETRON 2 MG/ML
4 INJECTION INTRAMUSCULAR; INTRAVENOUS
Status: COMPLETED | OUTPATIENT
Start: 2022-02-03 | End: 2022-02-03

## 2022-02-03 RX ADMIN — SODIUM CHLORIDE 1000 ML: 0.9 INJECTION, SOLUTION INTRAVENOUS at 02:02

## 2022-02-03 RX ADMIN — ONDANSETRON 4 MG: 2 INJECTION INTRAMUSCULAR; INTRAVENOUS at 02:02

## 2022-02-03 RX ADMIN — IOHEXOL 75 ML: 350 INJECTION, SOLUTION INTRAVENOUS at 05:02

## 2022-02-03 NOTE — ED TRIAGE NOTES
Pt is a 72 y/o male that presents to the ED via personal transport from home. Pt c/o vomiting and diarrhea x 1 year. He was advised by GI to report to ED if another episode occurs. Last episode of emesis was today at 1000, last episode of diarrhea was about 1315. Pt reports dizziness, lightheadedness, and cramping to abd and bilateral lower extremities.

## 2022-02-03 NOTE — ED NOTES
I-STAT Chem-8+ Results:    Value Reference Range   Sodium 140 136-145 mmol/L   Potassium  4.5 3.5-5.1 mmol/L   Chloride 105  mmol/L   Ionized Calcium 1.17 1.06-1.42 mmol/L   CO2 (measured) 22 23-29 mmol/L   Glucose 198  mg/dL   BUN 29 6-30 mg/dL   Creatinine 1.0 0.5-1.4 mg/dL   Hematocrit 43 36-54%
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
## 2022-02-03 NOTE — ASSESSMENT & PLAN NOTE
Patient with intermittent severe N/V/D with recurrent symptoms onset this AM.  Non-toxic appearing.  Previously evaluated with EGD/Colon/VCE unremarkable.  CT pending today.  Stool studies pending today.      Will order studies for hereditary angioedema to rule out.  Otherwise if no acute findings on CT scan and C. Diff negative, patient can likely discharge home.  Will message GI clinic for f/u clinic appointment with Dr. Tomas.
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
## 2022-02-03 NOTE — HPI
71 M w/ PMH HTN, HLD, DM2, afib, ENZO, Meniere's disease, presenting with recurrent intermittent severe N/V/D.  Patient has been previously evaluated by GI without significant etiology.  Symptoms usually onset abruptly with severe diarrhea and then N/V.  EGD/Colon/VCE unremarkable for etiology.  HIDA showing biliary dyskinesia, RUQ u/s showing gallbladder sludge.  Patient was most recently seen in GI clinic by Dr. Tomas at which time etiology was thought to be 2/2 hyperglycemia and poorly controlled DM2.  Patient presents today with one day history of recurrent symptoms onset this AM.  Reports he was unable to stay at work but his wife notes these symptoms are not as severe as previous.  Denies melena or hematochezia.  No sick contacts or other complaints.  No new foods or concerning foods.  Denies NSAIDS.
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
## 2022-02-04 ENCOUNTER — TELEPHONE (OUTPATIENT)
Dept: GASTROENTEROLOGY | Facility: CLINIC | Age: 72
End: 2022-02-04
Payer: MEDICARE

## 2022-02-04 LAB
C DIFF GDH STL QL: NEGATIVE
C DIFF TOX A+B STL QL IA: NEGATIVE
E COLI SXT1 STL QL IA: NEGATIVE
E COLI SXT2 STL QL IA: NEGATIVE

## 2022-02-04 NOTE — TELEPHONE ENCOUNTER
----- Message from aNtalee Armstrong MA sent at 2/3/2022  9:01 PM CST -----  Regarding: FW: Clinic f/u    ----- Message -----  From: Steven Rinaldi MD  Sent: 2/3/2022   6:12 PM CST  To: [facility] Staff  Subject: Clinic f/u                                       Can we get this jayda a f/u with Dr. Tomas in 2-4 weeks?

## 2022-02-04 NOTE — ED NOTES
PRIMARY DIAGNOSIS: GI BLEED    OUTPATIENT/OBSERVATION GOALS TO BE MET BEFORE DISCHARGE  Orthostatic performed: N/A    Stable Hgb Yes.   Recent Labs   Lab Test 03/05/19  1641 03/05/19  1129 03/05/19  0919   HGB 11.1* 11.6* 12.6*         Resolved or declined bleeding episodes: Yes Last episode: AM shift    Appropriate testing complete: Yes    Cleared for discharge by consultants (if involved): No    Safe discharge environment identified: No    Discharge Planner Nurse   Safe discharge environment identified: No  Barriers to discharge: Yes       Entered by: Kenyatta Cosme 03/05/2019 8:44 PM     Please review provider order for any additional goals.   Nurse to notify provider when observation goals have been met and patient is ready for discharge.   Patient turned over to me by Dr. Bella at 1730    Briefly:  72 yo M with chronic n/v/d/abd pain.  Sent in by GI.  Pending CT.    CT without acute surgical pathology.  Doubt cholecystitis, appendicitis, pancreatitis, SBO, diverticulitis, or any other acute abdominal surgical emergency.  Seen by GI who recommends discharge and clinic f/up.  Rx zofran.  Did bedside teaching.  All questions answered.  Patient acknowledges understanding.     Librado Mckeon MD  02/03/22 2027

## 2022-02-04 NOTE — TELEPHONE ENCOUNTER
Per Brenden Domínguez please refer patient to Dr. René Villa in general surgery for further evaluation of his Recurrent nausea vomiting epigastric pain Gallbladder sludge balls or nonshadowing stones. The gallbladder ejection fraction is 26 % at 29 minutes and abnormally low consistent with biliary dyskinesia.  Referral placed     Call for pt spoke with pt wife Shruthi  Ask her to have pt call the office back.... Madisyn Maurer MA

## 2022-02-07 ENCOUNTER — TELEPHONE (OUTPATIENT)
Dept: INTERNAL MEDICINE | Facility: CLINIC | Age: 72
End: 2022-02-07
Payer: MEDICARE

## 2022-02-07 ENCOUNTER — TELEPHONE (OUTPATIENT)
Dept: GASTROENTEROLOGY | Facility: CLINIC | Age: 72
End: 2022-02-07
Payer: MEDICARE

## 2022-02-07 LAB — BACTERIA STL CULT: NORMAL

## 2022-02-07 NOTE — TELEPHONE ENCOUNTER
----- Message from Guido Méndez sent at 2/7/2022  1:34 PM CST -----  Contact: Shruthi wife [phone]  Wife is calling in,  has an apt 2/17 with surgeon at 930 she wanted to see if they can be seen later than 1020 they don't want to be late and miss apt, they are coming from [place], please call back, thanks

## 2022-02-08 LAB
C1INH FUNCTIONAL/C1INH TOTAL MFR SERPL: 85 %
C1INH SERPL-MCNC: 27 MG/DL (ref 21–39)

## 2022-02-10 ENCOUNTER — LAB VISIT (OUTPATIENT)
Dept: LAB | Facility: HOSPITAL | Age: 72
End: 2022-02-10
Attending: INTERNAL MEDICINE
Payer: MEDICARE

## 2022-02-10 DIAGNOSIS — E11.49 TYPE II DIABETES MELLITUS WITH NEUROLOGICAL MANIFESTATIONS: ICD-10-CM

## 2022-02-10 DIAGNOSIS — E11.9 TYPE 2 DIABETES MELLITUS WITHOUT COMPLICATION, WITHOUT LONG-TERM CURRENT USE OF INSULIN: ICD-10-CM

## 2022-02-10 DIAGNOSIS — Z12.5 SCREENING PSA (PROSTATE SPECIFIC ANTIGEN): ICD-10-CM

## 2022-02-10 LAB
ALBUMIN SERPL BCP-MCNC: 3.7 G/DL (ref 3.5–5.2)
ALP SERPL-CCNC: 131 U/L (ref 55–135)
ALT SERPL W/O P-5'-P-CCNC: 38 U/L (ref 10–44)
ANION GAP SERPL CALC-SCNC: 13 MMOL/L (ref 8–16)
AST SERPL-CCNC: 32 U/L (ref 10–40)
BILIRUB SERPL-MCNC: 0.5 MG/DL (ref 0.1–1)
BUN SERPL-MCNC: 19 MG/DL (ref 8–23)
CALCIUM SERPL-MCNC: 9.2 MG/DL (ref 8.7–10.5)
CHLORIDE SERPL-SCNC: 106 MMOL/L (ref 95–110)
CHOLEST SERPL-MCNC: 101 MG/DL (ref 120–199)
CHOLEST/HDLC SERPL: 2.7 {RATIO} (ref 2–5)
CO2 SERPL-SCNC: 23 MMOL/L (ref 23–29)
COMPLEXED PSA SERPL-MCNC: 0.56 NG/ML (ref 0–4)
CREAT SERPL-MCNC: 1.1 MG/DL (ref 0.5–1.4)
EST. GFR  (AFRICAN AMERICAN): >60 ML/MIN/1.73 M^2
EST. GFR  (NON AFRICAN AMERICAN): >60 ML/MIN/1.73 M^2
ESTIMATED AVG GLUCOSE: 154 MG/DL (ref 68–131)
GLUCOSE SERPL-MCNC: 112 MG/DL (ref 70–110)
HBA1C MFR BLD: 7 % (ref 4–5.6)
HDLC SERPL-MCNC: 38 MG/DL (ref 40–75)
HDLC SERPL: 37.6 % (ref 20–50)
LDLC SERPL CALC-MCNC: 47 MG/DL (ref 63–159)
NONHDLC SERPL-MCNC: 63 MG/DL
POTASSIUM SERPL-SCNC: 4.5 MMOL/L (ref 3.5–5.1)
PROT SERPL-MCNC: 7 G/DL (ref 6–8.4)
SODIUM SERPL-SCNC: 142 MMOL/L (ref 136–145)
TRIGL SERPL-MCNC: 80 MG/DL (ref 30–150)

## 2022-02-10 PROCEDURE — 83036 HEMOGLOBIN GLYCOSYLATED A1C: CPT | Performed by: INTERNAL MEDICINE

## 2022-02-10 PROCEDURE — 80053 COMPREHEN METABOLIC PANEL: CPT | Performed by: INTERNAL MEDICINE

## 2022-02-10 PROCEDURE — 36415 COLL VENOUS BLD VENIPUNCTURE: CPT | Performed by: INTERNAL MEDICINE

## 2022-02-10 PROCEDURE — 84153 ASSAY OF PSA TOTAL: CPT | Performed by: INTERNAL MEDICINE

## 2022-02-10 PROCEDURE — 80061 LIPID PANEL: CPT | Performed by: INTERNAL MEDICINE

## 2022-02-14 RX ORDER — AMLODIPINE BESYLATE 10 MG/1
TABLET ORAL
Qty: 90 TABLET | Refills: 3 | Status: SHIPPED | OUTPATIENT
Start: 2022-02-14 | End: 2022-10-31 | Stop reason: SDUPTHER

## 2022-02-17 ENCOUNTER — OFFICE VISIT (OUTPATIENT)
Dept: INTERNAL MEDICINE | Facility: CLINIC | Age: 72
End: 2022-02-17
Payer: MEDICARE

## 2022-02-17 ENCOUNTER — OFFICE VISIT (OUTPATIENT)
Dept: SURGERY | Facility: CLINIC | Age: 72
End: 2022-02-17
Payer: MEDICARE

## 2022-02-17 VITALS
SYSTOLIC BLOOD PRESSURE: 140 MMHG | BODY MASS INDEX: 28.22 KG/M2 | HEART RATE: 71 BPM | HEIGHT: 72 IN | DIASTOLIC BLOOD PRESSURE: 77 MMHG | WEIGHT: 208.31 LBS

## 2022-02-17 VITALS
SYSTOLIC BLOOD PRESSURE: 122 MMHG | WEIGHT: 209.44 LBS | OXYGEN SATURATION: 97 % | BODY MASS INDEX: 28.37 KG/M2 | DIASTOLIC BLOOD PRESSURE: 64 MMHG | HEIGHT: 72 IN | HEART RATE: 71 BPM

## 2022-02-17 DIAGNOSIS — K31.84 GASTROPARESIS: Primary | ICD-10-CM

## 2022-02-17 DIAGNOSIS — E11.49 TYPE II DIABETES MELLITUS WITH NEUROLOGICAL MANIFESTATIONS: ICD-10-CM

## 2022-02-17 DIAGNOSIS — R11.2 INTRACTABLE VOMITING WITH NAUSEA, UNSPECIFIED VOMITING TYPE: ICD-10-CM

## 2022-02-17 DIAGNOSIS — N18.31 STAGE 3A CHRONIC KIDNEY DISEASE: Primary | ICD-10-CM

## 2022-02-17 DIAGNOSIS — E11.9 TYPE 2 DIABETES MELLITUS WITHOUT COMPLICATION, WITHOUT LONG-TERM CURRENT USE OF INSULIN: ICD-10-CM

## 2022-02-17 DIAGNOSIS — R11.0 NAUSEA: ICD-10-CM

## 2022-02-17 DIAGNOSIS — K80.20 SYMPTOMATIC CHOLELITHIASIS: ICD-10-CM

## 2022-02-17 DIAGNOSIS — I73.9 PERIPHERAL VASCULAR DISEASE, UNSPECIFIED: ICD-10-CM

## 2022-02-17 DIAGNOSIS — G62.9 PERIPHERAL POLYNEUROPATHY: ICD-10-CM

## 2022-02-17 PROCEDURE — 99999 PR PBB SHADOW E&M-EST. PATIENT-LVL III: CPT | Mod: PBBFAC,,, | Performed by: INTERNAL MEDICINE

## 2022-02-17 PROCEDURE — 99213 PR OFFICE/OUTPT VISIT, EST, LEVL III, 20-29 MIN: ICD-10-PCS | Mod: S$PBB,,, | Performed by: SURGERY

## 2022-02-17 PROCEDURE — 99213 OFFICE O/P EST LOW 20 MIN: CPT | Mod: PBBFAC | Performed by: SURGERY

## 2022-02-17 PROCEDURE — 99214 OFFICE O/P EST MOD 30 MIN: CPT | Mod: S$PBB,,, | Performed by: INTERNAL MEDICINE

## 2022-02-17 PROCEDURE — 99213 OFFICE O/P EST LOW 20 MIN: CPT | Mod: S$PBB,,, | Performed by: SURGERY

## 2022-02-17 PROCEDURE — 99999 PR PBB SHADOW E&M-EST. PATIENT-LVL III: ICD-10-PCS | Mod: PBBFAC,,, | Performed by: INTERNAL MEDICINE

## 2022-02-17 PROCEDURE — 99999 PR PBB SHADOW E&M-EST. PATIENT-LVL III: CPT | Mod: PBBFAC,,, | Performed by: SURGERY

## 2022-02-17 PROCEDURE — 99214 PR OFFICE/OUTPT VISIT, EST, LEVL IV, 30-39 MIN: ICD-10-PCS | Mod: S$PBB,,, | Performed by: INTERNAL MEDICINE

## 2022-02-17 PROCEDURE — 99213 OFFICE O/P EST LOW 20 MIN: CPT | Mod: PBBFAC,27 | Performed by: INTERNAL MEDICINE

## 2022-02-17 PROCEDURE — 99999 PR PBB SHADOW E&M-EST. PATIENT-LVL III: ICD-10-PCS | Mod: PBBFAC,,, | Performed by: SURGERY

## 2022-02-17 RX ORDER — ONDANSETRON 4 MG/1
4 TABLET, FILM COATED ORAL EVERY 6 HOURS PRN
Qty: 30 TABLET | Refills: 1 | Status: SHIPPED | OUTPATIENT
Start: 2022-02-17 | End: 2024-02-20 | Stop reason: SDUPTHER

## 2022-02-17 NOTE — PROGRESS NOTES
Subjective:      Ellis Hatch is a 71 y.o. year old male with hx of DM, GERD, BPH, A-fib(no blood thinners) and ENZO who presents to the general surgery clinic on 02/17/2022 for recurrent episodes of nausea, vomiting, abdominal pain and diarrhea. He states the episodes have been happening for a while now.  They happen every 3-4 months. His most recent episode required ED visit. His symptoms last for a day. He has associated bloating and central abdominal pain. He notes he has no blood in stool or emesis. He does state his stool is foul smelling. No fever, chills, or jaundice. CT in ED showed some sludge/possible stones in his GB. He also had a HIDA that showed decreased EF of GB at 26%. Prior EDG with some gastritis and HH. and c-scope with polyps otherwise normal.     Surgical History: bilateral inguinal hernia repair open, no other abdominal surgery  Social: 5/cig a day smoker, oral tobacco user  Meds: no blood thinners     Vitals:    02/17/22 0927   BP: (!) 140/77   Pulse: 71        Patient Active Problem List   Diagnosis    Type 2 diabetes mellitus without complication    Gout    Hyperlipemia    BPH (benign prostatic hyperplasia)    GERD (gastroesophageal reflux disease)    S/P total knee replacement    Anxiety    Depression    Peripheral neuropathy    Gastroenteritis    CKD (chronic kidney disease) stage 3, GFR 30-59 ml/min    Type II diabetes mellitus with neurological manifestations    Symptomatic cholelithiasis    Biliary sludge    Fe deficiency anemia    Abdominal pain    Peripheral vascular disease, unspecified    Diarrhea       Current Outpatient Medications   Medication Sig Dispense Refill    allopurinoL (ZYLOPRIM) 100 MG tablet TAKE 1 TABLET BY MOUTH ONCE A DAY TO PREVENT GOUT 90 tablet 3    ALPRAZolam (XANAX) 0.5 MG tablet TAKE 1 TABLET BY MOUTH 3 TIMES A DAY 15 tablet 2    amLODIPine (NORVASC) 10 MG tablet TAKE 1 TABLET BY MOUTH ONCE DAILY 90 tablet 3    atorvastatin  (LIPITOR) 40 MG tablet TAKE 1 TABLET BY MOUTH ONCE DAILY. 90 tablet 3    dulaglutide (TRULICITY) 0.75 mg/0.5 mL pen injector Inject 0.75 mg into the skin every 7 days. 12 pen 1    DULoxetine (CYMBALTA) 60 MG capsule Take 1 capsule (60 mg total) by mouth once daily. 90 capsule 3    fish oil-omega-3 fatty acids 300-1,000 mg capsule Take 2 g by mouth once daily.      FOLIC ACID/MULTIVITS-MIN/LUT (CENTRUM SILVER ORAL) Take 1 tablet by mouth once daily.      gabapentin (NEURONTIN) 300 MG capsule Take 1 capsule (300 mg total) by mouth 2 (two) times daily. 180 capsule 11    glimepiride (AMARYL) 4 MG tablet TAKE 1 TABLET TWICE DAILY WITH BREAKFAST AND SUPPER 180 tablet 1    glucosamine-condroitin-herb182 375-200-100 mg Cap Take 1 tablet by mouth once daily.       magnesium citrate solution Take 296 mLs by mouth daily as needed (constipation).      meloxicam (MOBIC) 15 MG tablet TAKE 1 TABLET ONCE DAILY. *TAKE WITH FOOD* 30 tablet 2    metFORMIN (GLUCOPHAGE) 500 MG tablet Take 2 tablets (1,000 mg total) by mouth 2 (two) times daily with meals. 360 tablet 3    ondansetron (ZOFRAN-ODT) 4 MG TbDL Take 1 tablet (4 mg total) by mouth every 6 (six) hours as needed (vomiting). 20 tablet 0    pantoprazole (PROTONIX) 40 MG tablet Take 1 tablet (40 mg total) by mouth once daily. 90 tablet 3    sildenafil (VIAGRA) 100 MG tablet Take 1 tablet (100 mg total) by mouth daily as needed for Erectile Dysfunction. 8 tablet 9    tamsulosin (FLOMAX) 0.4 mg Cap TAKE 1 CAPSULE BY MOUTH ONCE DAILY. 90 capsule 3     No current facility-administered medications for this visit.       Review of Systems:  See HPI: Otherwise negative.   Objective:     Physical Exam:  General: No acute distress  HEENT: atraumatic  Resp: No resp distress, effort normal, normal chest movements   Cardiac: Normal rate  Abdomen: Soft, NT, ND, diastasis   Skin: warm and dry   Neuro: AOX4, at baseline  Psych: Behavior normal       Assessment:       71M with  recurrent episodes of abd pain with N/V/D. Unclear etiology.   Plan:   GES to eval for gastroparesis although this does not sound like typical story  He has repeat EGD ordered with GI

## 2022-02-17 NOTE — PROGRESS NOTES
This is a 71 -year-old gentleman follow up for his medical problem.          In Jan 2021, He had  about 4 visits to the Hospital  In the last several month due to  an onset of vomiting with associated nausea, abd pain, and diarrhea. He had a egd, video capsule,  and a colonoscopy that had no obvious findings.  He saw GI today and they are going to do a gastric emptying study.  He believes this  Is Gastroparesis and may bbe associated with his tobacco use.  GB less likely.  ( was started on Trulicity 10/2021)  His N/V has been going one and 1/2 years.          . PMHx of HTN, acute kidney injury, kidney stones, and DM. PSHx of hernia repair and colonoscopy. Social hx of using skoal all day for the past 40 years.             Diabetes mellitus type 2. He has had DM for 20 years.  He is on   Amaryl 4 mg in the morning, metformin, and trulicity.  His N&V has not changed in nature or frequncy of episodes . . His hemoglobin A1c 7.0 and recent glucose is 112  . He has been moving around more. He is able to work, but not exercising very much. No low Glucoses at home. He is having some nocturnal burning in feet.            2. He has chronic kidney disease, status post acute kidney injury back in  December 2013. Creatinine had gone up to 2.0 to 2.3. He had renal stones and    had lithotripsy last year. Most recent creatinine is 1.1 , putting him at  stage II chronic kidney disease.   He has renal stones and sees a urologist in [place].          3. He has hyperlipidemia, which he has had for several years. He is on  Lipitor. His recent cholesterol is 101 , HDL 38, LDL is 47, and triglycerides    are 80       4. He has OA of both knees, s/p right  knee replacement 2013. And left knee replacement in 2018.    Gong to try to see Ortho in [place].       5. Torn rotator cuffs- He is going to see MD in [facility] for his shoulder.  .  Did not have surgery.  He is getting dry needling.   These helped a little.           6. ENZO- using  CPAP every night and is doing well. Feels well rested.       7. htn -- bp is  controled to day-122/64 -  Last visit we started him on amlodipine 5mg a day.  He has all his supplies.          He has had several skin cancers removed.  Follows up regularly with his dermatologist in [place].                ROS : Gen - no fatigue -- he lost 4   # since last visit--   Eyes - no eye pain or visual changes-- he has not been able to see optho due tho his doctor being unable to see him due to demand.  He has an appointment in Jan .    ENT - no hoarseness or sore throat  CV - No chest pain or SOB. NO palpitations.  Pulm - no cough or wheezing  GI - no N/V/D   no dysuria or incontinence  MS - no joint pain or muscle pain  Skin - no rash, or c/o of skin lesions  Neuro -  dizziness--- memory is doing well. -  Heme - no abnormal bleeding or bruising  Endo - no polydipsia, or temperature changes  Psych - no anxiety or depression          PHYSICAL EXAMINATION:          /64 (BP Location: Right arm, Patient Position: Sitting, BP Method: Large (Manual))   Pulse 71   Ht 6' (1.829 m)   Wt 95 kg (209 lb 7 oz)   SpO2 97%   BMI 28.40 kg/m²           General appearance: alert, appears stated age and cooperative  Head: Normocephalic, without obvious abnormality, atraumatic-  He has a t shaped wound o his scalp-  Left front-- stood up under his tool box.    Ears: normal TM's and external ear canals both ears  Nose: Nares normal. Septum midline. Mucosa normal. No drainage or sinus tenderness.  Throat: lips, mucosa, and tongue normal; teeth and gums normal  Neck: no adenopathy, no carotid bruit, no JVD, supple, symmetrical, trachea midline and thyroid not enlarged, symmetric, no tenderness/mass/nodules  Back: symmetric, no curvature. ROM normal. No CVA tenderness.  Lungs: clear to auscultation bilaterally  Chest wall: no tenderness  Heart: regular rate and rhythm, S1, S2 normal, no murmur, click, rub or gallop  Abdomen: soft,  non-tender; bowel sounds normal; no masses,  no organomegaly  Extremities: extremities normal, atraumatic, no cyanosis or edema  Pulses: 2+ and symmetric  Skin: Skin color, texture, turgor normal. No rashes or lesions                  ASSESSMENT:  1. Diabetes mellitus type 2:  trulicity , Amaryl 4mg bid, metformin 1000mg Bid. We discussed appropriate diet and exercise. Discussed hypoglycemia.  He walks daily.  .      .  Work on diet and discussed exercise.      labs look good.       2. Anxiety-- He is cutting  down to using 1/2 xanax every morning-  .    stay on cymbalta. .drug  reviewed.  3. He has obstructive sleep apnea for which he uses CPAP. He was using it every night- he wake up well refreshed.    4. ED-- he has tried Viagra- cialias.    5. Back pain- seeing Chiropractor for this.- off NSAID due to h/o DEE.       6. Peripheral neuropathy.  -Had to come off   elavil  == He is on this due to his oa of the knees and shoulder pains.  He had ARF in 2013 on NSAIDs.  We took him off the Mobic because of acute renal failure and his chronic kidney disease. So we discussed this again. Reviewed labs and will monitor.    7. HTN- better on  amlodipine to 10  mg a day -    8.  episodic N/V--resolved at last visit but is back  Discussed if trulicity is doing this.   They want to continue this.  Reviewed note with Gen Surgery             Follow up in 6  months with labs

## 2022-02-17 NOTE — LETTER
[facility]  [address]  [facility] [address]  Phone: [phone] February 17, 2022        Willem Pepper, DO  [address] [facility]  [facility] [address]    Patient: Ellis Hatch   MR Number: [id number]   YOB: 1950   Date of Visit: 2/17/2022       Dear Dr. Pepper:    Thank you for referring Ellis Hatch to me for evaluation. Attached you will find relevant portions of my assessment and plan of care.    If you have questions, please do not hesitate to call me. I look forward to following Ellis Hatch along with you.    Sincerely,          MD PERLA Byrd Jr., MD Enclosure

## 2022-02-17 NOTE — PROGRESS NOTES
72y/o with bmi 28.48, dm2 with neurologic complications not on long term insulin, essential htn, hld, smoker and tobacco chewer, gerd and symptomatic cholelithiasis.  He has nausea, vomiting, diarrhea and abdominal pain (upper abdominal and ruq).  This starts with belching and is associated with bloating.  This is episodic happening every few months.  There are no inciting factors.  The symptoms last a day and there are no relieving factors.  He is on ppi for gerd and these symptoms are well controlled.  He says he generally has a lot of gas.  This may be due to occasional hard candy use and use of chewing tobacco.  He says the tobacco stays in all day with no changes monthly.  He has lost 5 pounds and denies fevers and sweats.    Gastroparesis symptoms: moderate upper/ruq pain, extremely severe nausea, extremely severe vomiting but only every 2-3 months    GERD Questionnaire     qd PPI  Has rare Typical heartburn  has Regurgitation  has Dysphagia solids  has Dysphagia liquids  no Hoarseness  no Sore throat  no Cough  no Asthma  no Chest pain  No, unlness vomiting Water brash  no Globus  has Nausea  has Vomiting      Cbc, cmp reviewed, basically normal  Ct reviewed, films viewed, mesenteric stranding, diverticula, fatty liver, gallbladder with stones or sludge (dependant), possible small hh  Hida with ef reviewed, ef low, c/w symptomatic amado  U/s reviewed, films viewed no gallstones, fatty liver  Egd reviewed, images viewed, medium hh, gastritis    Possible symptomatic amado but this is not typically associated with so much nausea, vomiting and diarrhea.  Mesenteric stranding of unknown etiology but these symptoms are not typical from that.  Hh is likely not very large so is not likely associated with these symptoms.  Chewing tobacco and hard candy use may be a contributing factor.    Obtain ges.  I have counseled him to stop tobacco products and he can follow up with his pcp.  Mesenteric stranding of unknown  etiology and needs follow up (consider random biopsy if we do amado).

## 2022-03-08 ENCOUNTER — HOSPITAL ENCOUNTER (OUTPATIENT)
Dept: RADIOLOGY | Facility: HOSPITAL | Age: 72
Discharge: HOME | End: 2022-03-08
Attending: SURGERY
Payer: MEDICARE

## 2022-03-08 DIAGNOSIS — K31.84 GASTROPARESIS: ICD-10-CM

## 2022-03-08 DIAGNOSIS — R11.2 INTRACTABLE VOMITING WITH NAUSEA, UNSPECIFIED VOMITING TYPE: ICD-10-CM

## 2022-03-08 PROCEDURE — A9541 TC99M SULFUR COLLOID: HCPCS

## 2022-03-08 PROCEDURE — 78264 NM GASTRIC EMPTYING: ICD-10-PCS | Mod: 26,,, | Performed by: RADIOLOGY

## 2022-03-08 PROCEDURE — 78264 GASTRIC EMPTYING IMG STUDY: CPT | Mod: 26,,, | Performed by: RADIOLOGY

## 2022-03-09 ENCOUNTER — TELEPHONE (OUTPATIENT)
Dept: INTERNAL MEDICINE | Facility: CLINIC | Age: 72
End: 2022-03-09
Payer: MEDICARE

## 2022-03-09 NOTE — TELEPHONE ENCOUNTER
----- Message from Marilyn Larsen sent at 3/9/2022  3:29 PM CST -----  Contact: Spouse/ Shruthi [phone]  Calling to get test results.  Name of test (lab, x-ray): Gastric emptying  Date of test: 3/8/22

## 2022-03-10 NOTE — TELEPHONE ENCOUNTER
Care Due:                  Date            Visit Type   Department     Provider  --------------------------------------------------------------------------------                                EP -                              PRIMARY      [facility] INTERNAL  Last Visit: 02-      CARE ([facility])   LUCIA Delaney                              [facility]                              PRIMARY      [facility] INTERNAL  Next Visit: 07-      CARE ([facility])   [facility]       Eddie Delaney                                                            Last  Test          Frequency    Reason                     Performed    Due Date  --------------------------------------------------------------------------------    Uric Acid...  12 months..  allopurinoL..............  Not Found    Overdue    Powered by [employer]. Reference number: [id number].   3/10/2022 1:58:19 PM CST

## 2022-03-11 ENCOUNTER — TELEPHONE (OUTPATIENT)
Dept: SURGERY | Facility: CLINIC | Age: 72
End: 2022-03-11
Payer: MEDICARE

## 2022-03-11 NOTE — TELEPHONE ENCOUNTER
Informed pt wife of Dr. Villa's recommendations to proceed with a lap amado.  She understands and they will call when pt is ready to move forward.  He is dealing with things at work first.

## 2022-03-11 NOTE — TELEPHONE ENCOUNTER
----- Message from Wilder Ng sent at 3/11/2022  1:55 PM CST -----  Regarding: spotty cell  service- call bk please    The Pt's wife states that she had her phone in her hand and heard the sound that she had a voicemail from your missed call.     # [phone]

## 2022-03-11 NOTE — TELEPHONE ENCOUNTER
----- Message from Wilder Ng sent at 3/11/2022 10:20 AM CST -----  Regarding: missed call from Brigette- results    The Caller- Pt's wife missed a call from Brigette about the results.     # [phone] (Caller's Name is Shruthi)

## 2022-03-18 RX ORDER — DULAGLUTIDE 0.75 MG/.5ML
0.75 INJECTION, SOLUTION SUBCUTANEOUS
Qty: 12 PEN | Refills: 1 | Status: SHIPPED | OUTPATIENT
Start: 2022-03-18 | End: 2022-09-16

## 2022-03-18 NOTE — TELEPHONE ENCOUNTER
Refill Routing Note   Medication(s) are not appropriate for processing by [facility] for the following reason(s):      - Drug-Disease Interaction (TRULICITY and Diarrhea; Gastroenteritis)    [facility] action(s):  Defer Medication-related problems identified: Drug-disease interaction        --->Care Gap information included in message below if applicable.   Medication reconciliation completed: No   Automatic Epic Generated Protocol Data:        Requested Prescriptions   Pending Prescriptions Disp Refills    TRULICITY 0.75 mg/0.5 mL pen injector [Pharmacy Med Name: TRULICITY 0.75 MG/0.5 ML PE 0.75 Solution Pen-injector] 12 pen 1     Sig: INJECT 0.75 MG INTO THE SKIN EVERY 7 DAYS.       Endocrinology:  Diabetes - GLP-1 Receptor Agonists Passed - 3/10/2022  1:57 PM        Passed - Patient is at least 18 years old        Passed - Valid encounter within last 15 months     Recent Visits  Date Type Provider Dept   02/17/22 Office Visit Eddie Delaney Jr., MD [facility]   10/14/21 Office Visit Eddie Delaney Jr., MD [facility]   04/22/21 Office Visit Eddie Delaney Jr., MD [facility]   10/22/20 Office Visit Eddie Delaney Jr., MD [facility]   06/18/20 Office Visit Eddie Delaney Jr., MD [facility]   Showing recent visits within past 720 days and meeting all other requirements  Future Appointments  No visits were found meeting these conditions.  Showing future appointments within next 150 days and meeting all other requirements      Future Appointments              In 2 weeks MD Bart Ramon - [facility], Bart Graf    In 3 months LAB, [facility] - Lab, [facility]    In 3 months MD Bart Art Jr. [facility], Bart Graf PCW                Passed - HBA1C within 180 days     Lab Results   Component Value Date    HGBA1C 7.0 (H) 02/10/2022    HGBA1C 7.7 (H) 10/06/2021    HGBA1C 7.3 (H) 04/13/2021                     Appointments  past 12m or future 3m with PCP    Date Provider   Last Visit   2/17/2022 Eddie Delaney Jr., MD   Next Visit   7/1/2022 Eddie Delaney Jr., MD   ED visits in past 90 days: 1        Note composed:12:22 PM 03/18/2022

## 2022-05-09 NOTE — TELEPHONE ENCOUNTER
No new care gaps identified.  [facility] Embedded Care Gaps. Reference number: [id number]. 5/09/2022   12:59:15 PM CDT

## 2022-05-10 NOTE — TELEPHONE ENCOUNTER
Refill Routing Note   Medication(s) are not appropriate for processing by [facility] for the following reason(s):      - Required laboratory values are outdated  - Drug-Disease Interaction (metFORMIN and Diarrhea; Gastroenteritis)    [facility] action(s):  Defer Medication-related problems identified: Drug-disease interaction        Medication reconciliation completed: No     Appointments  past 12m or future 3m with PCP    Date Provider   Last Visit   2/17/2022 Eddie Delaney Jr., MD   Next Visit   7/1/2022 Eddie Delaney Jr., MD   ED visits in past 90 days: 0        Note composed:11:08 AM 05/10/2022

## 2022-05-13 RX ORDER — ALLOPURINOL 100 MG/1
TABLET ORAL
Qty: 90 TABLET | Refills: 3 | Status: SHIPPED | OUTPATIENT
Start: 2022-05-13 | End: 2023-05-11

## 2022-05-13 RX ORDER — METFORMIN HYDROCHLORIDE 500 MG/1
TABLET ORAL
Qty: 360 TABLET | Refills: 1 | Status: SHIPPED | OUTPATIENT
Start: 2022-05-13 | End: 2022-11-14

## 2022-05-31 RX ORDER — PANTOPRAZOLE SODIUM 40 MG/1
TABLET, DELAYED RELEASE ORAL
Qty: 90 TABLET | Refills: 2 | Status: SHIPPED | OUTPATIENT
Start: 2022-05-31 | End: 2023-07-22

## 2022-05-31 NOTE — TELEPHONE ENCOUNTER
Care Due:                  Date            Visit Type   Department     Provider  --------------------------------------------------------------------------------                                EP -                              PRIMARY      [facility] INTERNAL  Last Visit: 02-      CARE ([facility])   LUCIA Delaney                               -                              PRIMARY      [facility] INTERNAL  Next Visit: 07-      CARE ([facility])   [facility]       Eddie Delaney                                                            Last  Test          Frequency    Reason                     Performed    Due Date  --------------------------------------------------------------------------------    HBA1C.......  6 months...  TRULICITY, glimepiride,    02-   08-                             metFORMIN................    [facility] Embedded Care Gaps. Reference number: [id number]. 5/31/2022   12:34:42 PM CDT

## 2022-05-31 NOTE — TELEPHONE ENCOUNTER
Refill Authorization Note   Ellis Hatch  is requesting a refill authorization.  Brief Assessment and Rationale for Refill:  Approve     Medication Therapy Plan:       Medication Reconciliation Completed: No   Comments:     No Care Gaps recommended.     Note composed:12:49 PM 05/31/2022

## 2022-06-23 ENCOUNTER — LAB VISIT (OUTPATIENT)
Dept: LAB | Facility: HOSPITAL | Age: 72
End: 2022-06-23
Attending: INTERNAL MEDICINE
Payer: MEDICARE

## 2022-06-23 DIAGNOSIS — E11.49 TYPE II DIABETES MELLITUS WITH NEUROLOGICAL MANIFESTATIONS: ICD-10-CM

## 2022-06-23 LAB
ALBUMIN SERPL BCP-MCNC: 4.1 G/DL (ref 3.5–5.2)
ALP SERPL-CCNC: 116 U/L (ref 55–135)
ALT SERPL W/O P-5'-P-CCNC: 32 U/L (ref 10–44)
ANION GAP SERPL CALC-SCNC: 10 MMOL/L (ref 8–16)
AST SERPL-CCNC: 30 U/L (ref 10–40)
BILIRUB SERPL-MCNC: 0.7 MG/DL (ref 0.1–1)
BUN SERPL-MCNC: 30 MG/DL (ref 8–23)
CALCIUM SERPL-MCNC: 9.7 MG/DL (ref 8.7–10.5)
CHLORIDE SERPL-SCNC: 106 MMOL/L (ref 95–110)
CO2 SERPL-SCNC: 22 MMOL/L (ref 23–29)
CREAT SERPL-MCNC: 1.1 MG/DL (ref 0.5–1.4)
EST. GFR  (AFRICAN AMERICAN): >60 ML/MIN/1.73 M^2
EST. GFR  (NON AFRICAN AMERICAN): >60 ML/MIN/1.73 M^2
ESTIMATED AVG GLUCOSE: 166 MG/DL (ref 68–131)
GLUCOSE SERPL-MCNC: 147 MG/DL (ref 70–110)
HBA1C MFR BLD: 7.4 % (ref 4–5.6)
POTASSIUM SERPL-SCNC: 4.7 MMOL/L (ref 3.5–5.1)
PROT SERPL-MCNC: 7.8 G/DL (ref 6–8.4)
SODIUM SERPL-SCNC: 138 MMOL/L (ref 136–145)

## 2022-06-23 PROCEDURE — 83036 HEMOGLOBIN GLYCOSYLATED A1C: CPT | Performed by: INTERNAL MEDICINE

## 2022-06-23 PROCEDURE — 36415 COLL VENOUS BLD VENIPUNCTURE: CPT | Performed by: INTERNAL MEDICINE

## 2022-06-23 PROCEDURE — 80053 COMPREHEN METABOLIC PANEL: CPT | Performed by: INTERNAL MEDICINE

## 2022-07-01 ENCOUNTER — OFFICE VISIT (OUTPATIENT)
Dept: INTERNAL MEDICINE | Facility: CLINIC | Age: 72
End: 2022-07-01
Payer: MEDICARE

## 2022-07-01 ENCOUNTER — IMMUNIZATION (OUTPATIENT)
Dept: INTERNAL MEDICINE | Facility: CLINIC | Age: 72
End: 2022-07-01
Payer: MEDICARE

## 2022-07-01 VITALS
WEIGHT: 214.5 LBS | HEART RATE: 75 BPM | BODY MASS INDEX: 29.05 KG/M2 | HEIGHT: 72 IN | OXYGEN SATURATION: 97 % | SYSTOLIC BLOOD PRESSURE: 134 MMHG | DIASTOLIC BLOOD PRESSURE: 68 MMHG

## 2022-07-01 DIAGNOSIS — E11.9 TYPE 2 DIABETES MELLITUS WITHOUT COMPLICATION, WITHOUT LONG-TERM CURRENT USE OF INSULIN: Primary | ICD-10-CM

## 2022-07-01 DIAGNOSIS — E78.5 HYPERLIPIDEMIA, UNSPECIFIED HYPERLIPIDEMIA TYPE: ICD-10-CM

## 2022-07-01 DIAGNOSIS — F41.9 ANXIETY: ICD-10-CM

## 2022-07-01 DIAGNOSIS — Z23 NEED FOR VACCINATION: Primary | ICD-10-CM

## 2022-07-01 DIAGNOSIS — E11.49 TYPE II DIABETES MELLITUS WITH NEUROLOGICAL MANIFESTATIONS: ICD-10-CM

## 2022-07-01 DIAGNOSIS — N18.31 STAGE 3A CHRONIC KIDNEY DISEASE: ICD-10-CM

## 2022-07-01 PROCEDURE — 99214 PR OFFICE/OUTPT VISIT, EST, LEVL IV, 30-39 MIN: ICD-10-PCS | Mod: S$PBB,,, | Performed by: INTERNAL MEDICINE

## 2022-07-01 PROCEDURE — 91305 COVID-19, MRNA, LNP-S, PF, 30 MCG/0.3 ML DOSE VACCINE (PFIZER): CPT | Mod: PBBFAC

## 2022-07-01 PROCEDURE — 99214 OFFICE O/P EST MOD 30 MIN: CPT | Mod: S$PBB,,, | Performed by: INTERNAL MEDICINE

## 2022-07-01 PROCEDURE — 99999 PR PBB SHADOW E&M-EST. PATIENT-LVL III: CPT | Mod: PBBFAC,,, | Performed by: INTERNAL MEDICINE

## 2022-07-01 PROCEDURE — 99999 PR PBB SHADOW E&M-EST. PATIENT-LVL III: ICD-10-PCS | Mod: PBBFAC,,, | Performed by: INTERNAL MEDICINE

## 2022-07-01 PROCEDURE — 99213 OFFICE O/P EST LOW 20 MIN: CPT | Mod: PBBFAC | Performed by: INTERNAL MEDICINE

## 2022-07-01 NOTE — PROGRESS NOTES
This is a 72 -year-old gentleman follow up for his medical problem.          In Jan 2021, He had  about 4 visits to the Hospital  In the last several month due to  an onset of vomiting with associated nausea, abd pain, and diarrhea. He had a egd, video capsule,  and a colonoscopy that had no obvious findings.  He saw GI today and they are going to do a gastric emptying study.  He believes this  Is Gastroparesis and may bbe associated with his tobacco use.  GB less likely.  ( was started on Trulicity 10/2021)  His N/V has been going one and 1/2 years.           . PMHx of HTN, acute kidney injury, kidney stones, and DM. PSHx of hernia repair and colonoscopy. Social hx of using skoal all day for the past 40 years.             Diabetes mellitus type 2. He has had DM for 20 years.  He is on   Amaryl 4 mg in the morning, metformin, and trulicity.  His N&V has not changed in nature or frequncy of episodes . . His hemoglobin A1c 7.4  and recent glucose is 147   . He has been moving around more. He is able to work, but not exercising very much. No low Glucoses at home. He is having some nocturnal burning in feet.            2. He has chronic kidney disease, status post acute kidney injury back in  December 2013. Creatinine had gone up to 2.0 to 2.3. He had renal stones and    had lithotripsy last year. Most recent creatinine is 1.1 , putting him at  stage II chronic kidney disease.   He has renal stones and sees a urologist in [place].           3. He has hyperlipidemia, which he has had for several years. He is on  Lipitor. His recent cholesterol is 101 , HDL 38, LDL is 47, and triglycerides    are 80       4. He has OA of both knees, s/p right  knee replacement 2013. And left knee replacement in 2018.    Gong to try to see Ortho in [place].       5. Torn rotator cuffs- He is going to see MD in [facility] for his shoulder.  .  Did not have surgery.  He is getting dry needling.  this is fine        6. ENZO- using CPAP  every night and is doing well. Feels well rested.       7. htn -- bp is  controled to day-134/68 -  Last visit we started him on amlodipine 5mg a day.  He has all his supplies.          He has had several skin cancers removed.  Follows up regularly with his dermatologist in [place].                ROS : Gen - no fatigue -- he gained 4   # since last visit--   Eyes - no eye pain or visual changes--   He has an appointment in Jan .    ENT - no hoarseness or sore throat  CV - No chest pain or SOB. NO palpitations.  Pulm - no cough or wheezing  GI - no N/V/D   no dysuria or incontinence  MS - no joint pain or muscle pain  Skin - no rash, or c/o of skin lesions  Neuro -  dizziness--- memory is doing well. -  Heme - no abnormal bleeding or bruising  Endo - no polydipsia, or temperature changes  Psych - no anxiety or depression          PHYSICAL EXAMINATION:      /68 (BP Location: Left arm, Patient Position: Sitting, BP Method: Large (Manual))   Pulse 75   Ht 6' (1.829 m)   Wt 97.3 kg (214 lb 8.1 oz)   SpO2 97%   BMI 29.09 kg/m²                    General appearance: alert, appears stated age and cooperative  Head: Normocephalic, without obvious abnormality, atraumatic-  He has a t shaped wound o his scalp-  Left front-- stood up under his tool box.    Ears: normal TM's and external ear canals both ears  Nose: Nares normal. Septum midline. Mucosa normal. No drainage or sinus tenderness.  Throat: lips, mucosa, and tongue normal; teeth and gums normal  Neck: no adenopathy, no carotid bruit, no JVD, supple, symmetrical, trachea midline and thyroid not enlarged, symmetric, no tenderness/mass/nodules  Back: symmetric, no curvature. ROM normal. No CVA tenderness.  Lungs: clear to auscultation bilaterally  Chest wall: no tenderness  Heart: regular rate and rhythm, S1, S2 normal, no murmur, click, rub or gallop  Abdomen: soft, non-tender; bowel sounds normal; no masses,  no organomegaly  Extremities: extremities  normal, atraumatic, no cyanosis or edema  Pulses: 2+ and symmetric  Skin: Skin color, texture, turgor normal. No rashes or lesions                  ASSESSMENT:  1. Diabetes mellitus type 2:  trulicity , Amaryl 4mg bid, metformin 1000mg Bid. We discussed appropriate diet and exercise. Discussed hypoglycemia.  He walks daily.  .      .  Work on diet and discussed exercise.      labs look good ok but needs better control.        2. Anxiety-- He is cutting  down to using 1/2 xanax every morning-  .    stay on cymbalta. .drug  reviewed.  3. He has obstructive sleep apnea for which he uses CPAP. He was using it every night- he wake up well refreshed.    4. ED-- he has tried Viagra- cialias.    5. Back pain- seeing Chiropractor for this.- off NSAID due to h/o DEE.       6. Peripheral neuropathy.  -Had to come off   elavil  == He is on this due to his oa of the knees and shoulder pains.  He had ARF in 2013 on NSAIDs.  We took him off the Mobic because of acute renal failure and his chronic kidney disease. So we discussed this again. Reviewed labs and will monitor.    7. HTN- better on  amlodipine to 10  mg a day -    8.  episodic N/V-- this has resolved since Feb 2022           Follow up in 6  months with labs      covid booster

## 2022-08-08 RX ORDER — DULOXETIN HYDROCHLORIDE 60 MG/1
CAPSULE, DELAYED RELEASE ORAL
Qty: 90 CAPSULE | Refills: 3 | Status: SHIPPED | OUTPATIENT
Start: 2022-08-08 | End: 2023-08-15

## 2022-08-08 NOTE — TELEPHONE ENCOUNTER
Care Due:                  Date            Visit Type   Department     Provider  --------------------------------------------------------------------------------                                EP -                              PRIMARY      [facility] INTERNAL  Last Visit: 07-      CARE ([facility])   LUCIA Delaney                               -                              PRIMARY      [facility] INTERNAL  Next Visit: 01-      CARE ([facility])   [facility]       Eddie Delaney                                                            Last  Test          Frequency    Reason                     Performed    Due Date  --------------------------------------------------------------------------------    Uric Acid...  12 months..  allopurinoL..............  Not Found    Overdue    [employer] Embedded Care Gaps. Reference number: [id number]. 8/08/2022   1:34:33 PM CDT

## 2022-08-08 NOTE — TELEPHONE ENCOUNTER
----- Message from Iris Kline sent at 8/8/2022  1:31 PM CDT -----  Contact: [phone]  Requesting an RX refill or new RX.  Is this a refill or new RX: refill  RX name and strength (copy/paste from chart):  DULoxetine (CYMBALTA) 60 MG capsule  Is this a 30 day or 90 day RX:   Pharmacy name and phone # (copy/paste from chart):    [facility] - [place] [id number] [address]  Phone: [phone] Fax: [phone]  The doctors have asked that we provide their patients with the following 2 reminders -- prescription refills can take up to 72 hours, and a friendly reminder that in the future you can use your [facility] account to request refills: Call back

## 2022-10-31 RX ORDER — AMLODIPINE BESYLATE 10 MG/1
10 TABLET ORAL DAILY
Qty: 90 TABLET | Refills: 3 | Status: SHIPPED | OUTPATIENT
Start: 2022-10-31 | End: 2023-05-01 | Stop reason: SDUPTHER

## 2022-10-31 NOTE — TELEPHONE ENCOUNTER
----- Message from Broderick Tomas sent at 10/31/2022  2:07 PM CDT -----  Contact: Pt [phone]  Requesting an RX refill or new RX.  Is this a refill or new RX: refill  RX name and strength (copy/paste from chart):  amLODIPine (NORVASC) 10 MG tablet  Is this a 30 day or 90 day RX:   Pharmacy name and phone # (copy/paste from chart):  [facility] - [place] [id number]   Phone:  [phone]  Fax:  [phone]        The doctors have asked that we provide their patients with the following 2 reminders -- prescription refills can take up to 72 hours, and a friendly reminder that in the future you can use your [facility] account to request refills: yes     Pt also would like to ask Dr Delaney to do a fit in kilo for him and his wife due to kilo scheduled for [place] was booked out he states he knows he they are please advise     
No new care gaps identified.  [facility] Embedded Care Gaps. Reference number: [id number]. 10/31/2022   2:13:58 PM CDT  
None

## 2022-11-22 ENCOUNTER — TELEPHONE (OUTPATIENT)
Dept: INTERNAL MEDICINE | Facility: CLINIC | Age: 72
End: 2022-11-22
Payer: MEDICARE

## 2022-11-22 NOTE — TELEPHONE ENCOUNTER
----- Message from Nereyda Car MA sent at 11/15/2022  1:27 PM CST -----  Contact: [phone]    ----- Message -----  From: Duyen Mancia  Sent: 11/15/2022   1:16 PM CST  To: Elaina POTTS Jr Staff    Pt's wife called to advise that the pt's glucose has been 240-280 for the past week or so. Would like to know what they should do. Please Advise.

## 2022-11-28 DIAGNOSIS — E11.9 TYPE 2 DIABETES MELLITUS WITHOUT COMPLICATION, WITHOUT LONG-TERM CURRENT USE OF INSULIN: Primary | ICD-10-CM

## 2022-11-28 RX ORDER — MELOXICAM 15 MG/1
TABLET ORAL
Qty: 30 TABLET | Refills: 2 | Status: SHIPPED | OUTPATIENT
Start: 2022-11-28 | End: 2023-03-14

## 2022-11-29 ENCOUNTER — OFFICE VISIT (OUTPATIENT)
Dept: INTERNAL MEDICINE | Facility: CLINIC | Age: 72
End: 2022-11-29
Payer: MEDICARE

## 2022-11-29 ENCOUNTER — LAB VISIT (OUTPATIENT)
Dept: LAB | Facility: HOSPITAL | Age: 72
End: 2022-11-29
Attending: INTERNAL MEDICINE
Payer: MEDICARE

## 2022-11-29 VITALS
BODY MASS INDEX: 28.64 KG/M2 | DIASTOLIC BLOOD PRESSURE: 76 MMHG | HEIGHT: 72 IN | OXYGEN SATURATION: 95 % | WEIGHT: 211.44 LBS | SYSTOLIC BLOOD PRESSURE: 132 MMHG | HEART RATE: 70 BPM

## 2022-11-29 DIAGNOSIS — E11.49 TYPE II DIABETES MELLITUS WITH NEUROLOGICAL MANIFESTATIONS: Primary | ICD-10-CM

## 2022-11-29 DIAGNOSIS — E11.49 TYPE II DIABETES MELLITUS WITH NEUROLOGICAL MANIFESTATIONS: ICD-10-CM

## 2022-11-29 PROBLEM — R10.9 ABDOMINAL PAIN: Status: RESOLVED | Noted: 2021-04-01 | Resolved: 2022-11-29

## 2022-11-29 LAB
ALBUMIN SERPL BCP-MCNC: 4.1 G/DL (ref 3.5–5.2)
ALBUMIN/CREAT UR: 498.8 UG/MG (ref 0–30)
ALP SERPL-CCNC: 107 U/L (ref 55–135)
ALT SERPL W/O P-5'-P-CCNC: 33 U/L (ref 10–44)
ANION GAP SERPL CALC-SCNC: 10 MMOL/L (ref 8–16)
AST SERPL-CCNC: 24 U/L (ref 10–40)
BACTERIA #/AREA URNS AUTO: ABNORMAL /HPF
BILIRUB SERPL-MCNC: 0.7 MG/DL (ref 0.1–1)
BILIRUB UR QL STRIP: NEGATIVE
BUN SERPL-MCNC: 25 MG/DL (ref 8–23)
CALCIUM SERPL-MCNC: 9.8 MG/DL (ref 8.7–10.5)
CHLORIDE SERPL-SCNC: 105 MMOL/L (ref 95–110)
CLARITY UR REFRACT.AUTO: ABNORMAL
CO2 SERPL-SCNC: 25 MMOL/L (ref 23–29)
COLOR UR AUTO: YELLOW
CREAT SERPL-MCNC: 1.1 MG/DL (ref 0.5–1.4)
CREAT UR-MCNC: 160 MG/DL (ref 23–375)
EST. GFR  (NO RACE VARIABLE): >60 ML/MIN/1.73 M^2
ESTIMATED AVG GLUCOSE: 174 MG/DL (ref 68–131)
GLUCOSE SERPL-MCNC: 150 MG/DL (ref 70–110)
GLUCOSE UR QL STRIP: NEGATIVE
HBA1C MFR BLD: 7.7 % (ref 4–5.6)
HGB UR QL STRIP: NEGATIVE
HYALINE CASTS UR QL AUTO: 8 /LPF
KETONES UR QL STRIP: NEGATIVE
LEUKOCYTE ESTERASE UR QL STRIP: NEGATIVE
MICROALBUMIN UR DL<=1MG/L-MCNC: 798 UG/ML
MICROSCOPIC COMMENT: ABNORMAL
NITRITE UR QL STRIP: NEGATIVE
PH UR STRIP: 5 [PH] (ref 5–8)
POTASSIUM SERPL-SCNC: 5.1 MMOL/L (ref 3.5–5.1)
PROT SERPL-MCNC: 7.2 G/DL (ref 6–8.4)
PROT UR QL STRIP: ABNORMAL
RBC #/AREA URNS AUTO: 1 /HPF (ref 0–4)
SODIUM SERPL-SCNC: 140 MMOL/L (ref 136–145)
SP GR UR STRIP: 1.02 (ref 1–1.03)
SQUAMOUS #/AREA URNS AUTO: 7 /HPF
URN SPEC COLLECT METH UR: ABNORMAL
WBC #/AREA URNS AUTO: 1 /HPF (ref 0–5)

## 2022-11-29 PROCEDURE — 99999 PR PBB SHADOW E&M-EST. PATIENT-LVL III: ICD-10-PCS | Mod: PBBFAC,,, | Performed by: INTERNAL MEDICINE

## 2022-11-29 PROCEDURE — 36415 COLL VENOUS BLD VENIPUNCTURE: CPT | Performed by: INTERNAL MEDICINE

## 2022-11-29 PROCEDURE — 81001 URINALYSIS AUTO W/SCOPE: CPT | Performed by: INTERNAL MEDICINE

## 2022-11-29 PROCEDURE — 99999 PR PBB SHADOW E&M-EST. PATIENT-LVL III: CPT | Mod: PBBFAC,,, | Performed by: INTERNAL MEDICINE

## 2022-11-29 PROCEDURE — 83036 HEMOGLOBIN GLYCOSYLATED A1C: CPT | Performed by: INTERNAL MEDICINE

## 2022-11-29 PROCEDURE — 80053 COMPREHEN METABOLIC PANEL: CPT | Performed by: INTERNAL MEDICINE

## 2022-11-29 PROCEDURE — 81003 URINALYSIS AUTO W/O SCOPE: CPT | Mod: 59 | Performed by: INTERNAL MEDICINE

## 2022-11-29 PROCEDURE — 82570 ASSAY OF URINE CREATININE: CPT | Performed by: INTERNAL MEDICINE

## 2022-11-29 PROCEDURE — 99214 PR OFFICE/OUTPT VISIT, EST, LEVL IV, 30-39 MIN: ICD-10-PCS | Mod: S$PBB,,, | Performed by: INTERNAL MEDICINE

## 2022-11-29 PROCEDURE — 82043 UR ALBUMIN QUANTITATIVE: CPT | Performed by: INTERNAL MEDICINE

## 2022-11-29 PROCEDURE — 99213 OFFICE O/P EST LOW 20 MIN: CPT | Mod: PBBFAC | Performed by: INTERNAL MEDICINE

## 2022-11-29 PROCEDURE — 99214 OFFICE O/P EST MOD 30 MIN: CPT | Mod: S$PBB,,, | Performed by: INTERNAL MEDICINE

## 2022-11-29 RX ORDER — DULAGLUTIDE 1.5 MG/.5ML
1.5 INJECTION, SOLUTION SUBCUTANEOUS
Qty: 12 PEN | Refills: 3 | Status: SHIPPED | OUTPATIENT
Start: 2022-11-29 | End: 2023-02-15

## 2022-11-29 NOTE — TELEPHONE ENCOUNTER
Please call-- His gliucose is elevated still but not too bad-- I want to increase his trulicity-- I sent a new prescription in for him

## 2022-11-29 NOTE — PROGRESS NOTES
This is a 72 -year-old gentleman follow up for his medical problem.  Blood sugar has been running high for the last 2-3 weeks.                    . PMHx of HTN, acute kidney injury, kidney stones, and DM. PSHx of hernia repair and colonoscopy. Social hx of using skoal all day for the past 40 years. No change in diet. Looks like the first 2 weeks in NOv- but now they are coming back down.  Was in the 250's . No pill changes- same dose of trulicity.  He denies any polyuria.  NO recent steroids.  Got new machine and glucose have been doing better.               Diabetes mellitus type 2. He has had DM for 20 years.  He is on   Amaryl 4 mg in the morning, metformin, and trulicity.    . . His hemoglobin A1c 7.4 (6/2022)   and recent glucose is 147   . He has been moving around more. He is able to work, but not exercising very much. No low Glucoses at home. He is having some nocturnal burning in feet.            2. He has chronic kidney disease, status post acute kidney injury back in  December 2013. Creatinine had gone up to 2.0 to 2.3. He had renal stones and    had lithotripsy last year. Most recent creatinine is 1.1 , putting him at  stage II chronic kidney disease.   He has renal stones and sees a urologist in [place].           3. He has hyperlipidemia, which he has had for several years. He is on  Lipitor. His recent cholesterol is 101 , HDL 38, LDL is 47, and triglycerides    are 80       4. He has OA of both knees, s/p right  knee replacement 2013. And left knee replacement in 2018.    Gong to try to see Ortho in [place].       5. Torn rotator cuffs- He is going to see MD in [facility] for his shoulder.  .  Did not have surgery.  He is getting dry needling.  this is fine        6. ENZO- using CPAP every night and is doing well. Feels well rested.       7. htn -- bp is  controled to day-132/76-  Last visit we started him on amlodipine 5mg a day.  He has all his supplies.          He has had several skin cancers  removed.  Follows up regularly with his dermatologist in [place].                ROS : Gen - no fatigue -- he lost 3   # since last visit--   Eyes - no eye pain  - he has had some blurriness. -   He has an appointment in Jan .    ENT - no hoarseness or sore throat  CV - No chest pain or SOB. NO palpitations.  Pulm - no cough or wheezing  GI - no N/V/D   no dysuria or incontinence  MS - no joint pain or muscle pain  Skin - no rash, or c/o of skin lesions  Neuro -  dizziness--- memory is doing well. -  Heme - no abnormal bleeding or bruising  Endo - no polydipsia, or temperature changes  Psych -  HE has been more stressed recently-- it is slow time of year for his buisness        PHYSICAL EXAMINATION:        /76 (BP Location: Right arm, Patient Position: Sitting, BP Method: Large (Manual))   Pulse 70   Ht 6' (1.829 m)   Wt 95.9 kg (211 lb 6.7 oz)   SpO2 95%   BMI 28.67 kg/m²              General appearance: alert, appears stated age and cooperative  Head: Normocephalic, without obvious abnormality, atraumatic-  He has a t shaped wound o his scalp-  Left front-- stood up under his tool box.    Ears: normal TM's and external ear canals both ears  Nose: Nares normal. Septum midline. Mucosa normal. No drainage or sinus tenderness.  Throat: lips, mucosa, and tongue normal; teeth and gums normal  Neck: no adenopathy, no carotid bruit, no JVD, supple, symmetrical, trachea midline and thyroid not enlarged, symmetric, no tenderness/mass/nodules  Back: symmetric, no curvature. ROM normal. No CVA tenderness.  Lungs: clear to auscultation bilaterally  Chest wall: no tenderness  Heart: regular rate and rhythm, S1, S2 normal, no murmur, click, rub or gallop  Abdomen: soft, non-tender; bowel sounds normal; no masses,  no organomegaly  Extremities: extremities normal, atraumatic, no cyanosis or edema  Pulses: 2+ and symmetric  Skin: Skin color, texture, turgor normal. No rashes or lesions                   ASSESSMENT:  1. Diabetes mellitus type 2:  trulicity ( on low dose-- can increase if wee needs to.  WIll check hgb A1c and CMP first and also check UA to rule out UTI)  , Amaryl 4mg bid, metformin 1000mg Bid. We discussed appropriate diet and exercise. Discussed hypoglycemia.  He walks daily.  .      .  Work on diet and discussed exercise.      labs look good ok but needs better control.        2. Anxiety-- He is cutting  down to using 1/2 xanax every morning-  .    stay on cymbalta. .drug  reviewed.  3. He has obstructive sleep apnea for which he uses CPAP. He was using it every night- he wake up well refreshed.    4. ED-- he has tried Viagra- cialias.    5. Back pain- seeing Chiropractor for this.- off NSAID due to h/o DEE.       6. Peripheral neuropathy.  -Had to come off   elavil  == He is on this due to his oa of the knees and shoulder pains.  He had ARF in 2013 on NSAIDs.  We took him off the Mobic because of acute renal failure and his chronic kidney disease. So we discussed this again. Reviewed labs and will monitor.    7. HTN- better on  amlodipine to 10  mg a day -    8.  episodic N/V-- this has resolved since Feb 2022           Follow up in 6  months with labs       covid booster

## 2022-11-30 NOTE — TELEPHONE ENCOUNTER
Informed Ms. Hawkins, the pt wife, per Dr. Delaney's saying... she verbalized understanding and will relay the message to pt.     Nereyda DELCID

## 2023-01-23 ENCOUNTER — TELEPHONE (OUTPATIENT)
Dept: ENDOSCOPY | Facility: HOSPITAL | Age: 73
End: 2023-01-23
Payer: MEDICARE

## 2023-02-14 ENCOUNTER — LAB VISIT (OUTPATIENT)
Dept: LAB | Facility: HOSPITAL | Age: 73
End: 2023-02-14
Attending: INTERNAL MEDICINE
Payer: MEDICARE

## 2023-02-14 ENCOUNTER — OFFICE VISIT (OUTPATIENT)
Dept: INTERNAL MEDICINE | Facility: CLINIC | Age: 73
End: 2023-02-14
Payer: MEDICARE

## 2023-02-14 VITALS
SYSTOLIC BLOOD PRESSURE: 124 MMHG | BODY MASS INDEX: 28.22 KG/M2 | WEIGHT: 212.94 LBS | OXYGEN SATURATION: 95 % | HEART RATE: 67 BPM | HEIGHT: 73 IN | DIASTOLIC BLOOD PRESSURE: 76 MMHG

## 2023-02-14 VITALS
DIASTOLIC BLOOD PRESSURE: 76 MMHG | OXYGEN SATURATION: 95 % | HEART RATE: 67 BPM | BODY MASS INDEX: 28.84 KG/M2 | WEIGHT: 212.94 LBS | SYSTOLIC BLOOD PRESSURE: 124 MMHG | HEIGHT: 72 IN

## 2023-02-14 DIAGNOSIS — Z00.00 ENCOUNTER FOR PREVENTIVE HEALTH EXAMINATION: Primary | ICD-10-CM

## 2023-02-14 DIAGNOSIS — Z12.5 SCREENING PSA (PROSTATE SPECIFIC ANTIGEN): ICD-10-CM

## 2023-02-14 DIAGNOSIS — Z74.09 OTHER REDUCED MOBILITY: ICD-10-CM

## 2023-02-14 DIAGNOSIS — N18.31 STAGE 3A CHRONIC KIDNEY DISEASE: ICD-10-CM

## 2023-02-14 DIAGNOSIS — G62.9 PERIPHERAL POLYNEUROPATHY: ICD-10-CM

## 2023-02-14 DIAGNOSIS — E11.65 TYPE 2 DIABETES MELLITUS WITH HYPERGLYCEMIA, WITHOUT LONG-TERM CURRENT USE OF INSULIN: ICD-10-CM

## 2023-02-14 DIAGNOSIS — R26.9 ABNORMALITY OF GAIT AND MOBILITY: ICD-10-CM

## 2023-02-14 DIAGNOSIS — E11.49 TYPE II DIABETES MELLITUS WITH NEUROLOGICAL MANIFESTATIONS: ICD-10-CM

## 2023-02-14 DIAGNOSIS — F41.9 ANXIETY: ICD-10-CM

## 2023-02-14 DIAGNOSIS — J43.9 PULMONARY EMPHYSEMA, UNSPECIFIED EMPHYSEMA TYPE: ICD-10-CM

## 2023-02-14 DIAGNOSIS — D50.9 IRON DEFICIENCY ANEMIA, UNSPECIFIED IRON DEFICIENCY ANEMIA TYPE: ICD-10-CM

## 2023-02-14 DIAGNOSIS — I73.9 PERIPHERAL VASCULAR DISEASE, UNSPECIFIED: ICD-10-CM

## 2023-02-14 DIAGNOSIS — Z87.891 PERSONAL HISTORY OF NICOTINE DEPENDENCE: ICD-10-CM

## 2023-02-14 DIAGNOSIS — K21.9 GASTROESOPHAGEAL REFLUX DISEASE, UNSPECIFIED WHETHER ESOPHAGITIS PRESENT: ICD-10-CM

## 2023-02-14 DIAGNOSIS — N40.0 BENIGN PROSTATIC HYPERPLASIA, UNSPECIFIED WHETHER LOWER URINARY TRACT SYMPTOMS PRESENT: ICD-10-CM

## 2023-02-14 DIAGNOSIS — R79.9 ABNORMAL FINDING OF BLOOD CHEMISTRY, UNSPECIFIED: ICD-10-CM

## 2023-02-14 DIAGNOSIS — E78.5 HYPERLIPIDEMIA, UNSPECIFIED HYPERLIPIDEMIA TYPE: ICD-10-CM

## 2023-02-14 DIAGNOSIS — I48.0 PAROXYSMAL ATRIAL FIBRILLATION: ICD-10-CM

## 2023-02-14 DIAGNOSIS — I48.0 PAROXYSMAL ATRIAL FIBRILLATION: Primary | ICD-10-CM

## 2023-02-14 DIAGNOSIS — M10.9 GOUT, UNSPECIFIED CAUSE, UNSPECIFIED CHRONICITY, UNSPECIFIED SITE: ICD-10-CM

## 2023-02-14 DIAGNOSIS — Z72.0 TOBACCO ABUSE: ICD-10-CM

## 2023-02-14 DIAGNOSIS — F32.A DEPRESSION, UNSPECIFIED DEPRESSION TYPE: ICD-10-CM

## 2023-02-14 LAB
ALBUMIN SERPL BCP-MCNC: 3.9 G/DL (ref 3.5–5.2)
ALP SERPL-CCNC: 92 U/L (ref 55–135)
ALT SERPL W/O P-5'-P-CCNC: 37 U/L (ref 10–44)
ANION GAP SERPL CALC-SCNC: 8 MMOL/L (ref 8–16)
AST SERPL-CCNC: 31 U/L (ref 10–40)
BILIRUB SERPL-MCNC: 0.6 MG/DL (ref 0.1–1)
BUN SERPL-MCNC: 19 MG/DL (ref 8–23)
CALCIUM SERPL-MCNC: 9.5 MG/DL (ref 8.7–10.5)
CHLORIDE SERPL-SCNC: 105 MMOL/L (ref 95–110)
CHOLEST SERPL-MCNC: 113 MG/DL (ref 120–199)
CHOLEST/HDLC SERPL: 2.8 {RATIO} (ref 2–5)
CO2 SERPL-SCNC: 25 MMOL/L (ref 23–29)
COMPLEXED PSA SERPL-MCNC: 0.57 NG/ML (ref 0–4)
CREAT SERPL-MCNC: 0.9 MG/DL (ref 0.5–1.4)
EST. GFR  (NO RACE VARIABLE): >60 ML/MIN/1.73 M^2
ESTIMATED AVG GLUCOSE: 183 MG/DL (ref 68–131)
GLUCOSE SERPL-MCNC: 125 MG/DL (ref 70–110)
HBA1C MFR BLD: 8 % (ref 4–5.6)
HDLC SERPL-MCNC: 41 MG/DL (ref 40–75)
HDLC SERPL: 36.3 % (ref 20–50)
LDLC SERPL CALC-MCNC: 45.2 MG/DL (ref 63–159)
NONHDLC SERPL-MCNC: 72 MG/DL
POTASSIUM SERPL-SCNC: 4.5 MMOL/L (ref 3.5–5.1)
PROT SERPL-MCNC: 7.1 G/DL (ref 6–8.4)
SODIUM SERPL-SCNC: 138 MMOL/L (ref 136–145)
TRIGL SERPL-MCNC: 134 MG/DL (ref 30–150)

## 2023-02-14 PROCEDURE — 80061 LIPID PANEL: CPT | Performed by: INTERNAL MEDICINE

## 2023-02-14 PROCEDURE — 36415 COLL VENOUS BLD VENIPUNCTURE: CPT | Performed by: INTERNAL MEDICINE

## 2023-02-14 PROCEDURE — 99215 OFFICE O/P EST HI 40 MIN: CPT | Mod: S$PBB,,, | Performed by: INTERNAL MEDICINE

## 2023-02-14 PROCEDURE — 99215 PR OFFICE/OUTPT VISIT, EST, LEVL V, 40-54 MIN: ICD-10-PCS | Mod: S$PBB,,, | Performed by: INTERNAL MEDICINE

## 2023-02-14 PROCEDURE — 99214 OFFICE O/P EST MOD 30 MIN: CPT | Mod: 25,PBBFAC | Performed by: NURSE PRACTITIONER

## 2023-02-14 PROCEDURE — 99213 OFFICE O/P EST LOW 20 MIN: CPT | Mod: PBBFAC | Performed by: INTERNAL MEDICINE

## 2023-02-14 PROCEDURE — 99999 PR PBB SHADOW E&M-EST. PATIENT-LVL IV: CPT | Mod: PBBFAC,,, | Performed by: NURSE PRACTITIONER

## 2023-02-14 PROCEDURE — G0439 PPPS, SUBSEQ VISIT: HCPCS | Mod: ,,, | Performed by: NURSE PRACTITIONER

## 2023-02-14 PROCEDURE — 83036 HEMOGLOBIN GLYCOSYLATED A1C: CPT | Performed by: INTERNAL MEDICINE

## 2023-02-14 PROCEDURE — 99999 PR PBB SHADOW E&M-EST. PATIENT-LVL IV: ICD-10-PCS | Mod: PBBFAC,,, | Performed by: NURSE PRACTITIONER

## 2023-02-14 PROCEDURE — 80053 COMPREHEN METABOLIC PANEL: CPT | Performed by: INTERNAL MEDICINE

## 2023-02-14 PROCEDURE — 99999 PR PBB SHADOW E&M-EST. PATIENT-LVL III: CPT | Mod: PBBFAC,,, | Performed by: INTERNAL MEDICINE

## 2023-02-14 PROCEDURE — 84153 ASSAY OF PSA TOTAL: CPT | Performed by: INTERNAL MEDICINE

## 2023-02-14 PROCEDURE — 99999 PR PBB SHADOW E&M-EST. PATIENT-LVL III: ICD-10-PCS | Mod: PBBFAC,,, | Performed by: INTERNAL MEDICINE

## 2023-02-14 PROCEDURE — G0439 PR MEDICARE ANNUAL WELLNESS SUBSEQUENT VISIT: ICD-10-PCS | Mod: ,,, | Performed by: NURSE PRACTITIONER

## 2023-02-14 RX ORDER — GABAPENTIN 300 MG/1
300 CAPSULE ORAL 2 TIMES DAILY
Qty: 180 CAPSULE | Refills: 3 | Status: SHIPPED | OUTPATIENT
Start: 2023-02-14 | End: 2024-02-20 | Stop reason: SDUPTHER

## 2023-02-14 NOTE — PROGRESS NOTES
"Ellis Hatch presented for a  Medicare AWV and comprehensive Health Risk Assessment today. The following components were reviewed and updated:    Medical history  Family History  Social history  Allergies and Current Medications  Health Risk Assessment  Health Maintenance  Care Team         ** See Completed Assessments for Annual Wellness Visit within the encounter summary.**         The following assessments were completed:  Living Situation  CAGE  Depression Screening  Timed Get Up and Go  Whisper Test  Cognitive Function Screening    Nutrition Screening  ADL Screening  PAQ Screening  Review for Opioid Screening: Pt does not have Rx for Opioids.  Review for Substance Use Disorders: Patient does not use substances.        Vitals:    02/14/23 1104   BP: 124/76   BP Location: Left arm   Pulse: 67   SpO2: 95%   Weight: 96.6 kg (212 lb 15.4 oz)   Height: 6' 1" (1.854 m)     Body mass index is 28.1 kg/m².    Physical Exam  Vitals reviewed.   Constitutional:       Appearance: Normal appearance.   HENT:      Head: Normocephalic.   Cardiovascular:      Rate and Rhythm: Normal rate.   Pulmonary:      Effort: Pulmonary effort is normal.   Abdominal:      General: Bowel sounds are normal.   Musculoskeletal:      Right lower leg: No edema.      Left lower leg: No edema.      Comments: Gait antalgic.   Skin:     General: Skin is warm and dry.      Capillary Refill: Capillary refill takes less than 2 seconds.   Neurological:      Mental Status: He is alert and oriented to person, place, and time.   Psychiatric:         Behavior: Behavior normal.         Thought Content: Thought content normal.         Judgment: Judgment normal.             Diagnoses and health risks identified today and associated recommendations/orders:    1. Encounter for preventive health examination  Assessments completed.  HM recommendations reviewed. States he received shingrix, covid bivalent booster but likely in [place] immunization system. Discussed PCV23 " booster.   F/u with PCP as instructed.    2. Stage 3a chronic kidney disease  Chronic, stable on current regimen. Followed by PCP.    3. Pulmonary emphysema, unspecified emphysema type  Chronic, stable on current regimen. Followed by PCP.    4. Peripheral vascular disease, unspecified  Chronic, stable on current regimen. Followed by PCP / outside cardiology.    5. Paroxysmal atrial fibrillation  Chronic, stable on current regimen. Followed by PCP / outside cardiology.    6. Type 2 diabetes mellitus with hyperglycemia, without long-term current use of insulin  Chronic, stable on current regimen. Followed by PCP.    7. Type II diabetes mellitus with neurological manifestations  Chronic, stable on current regimen. Followed by PCP.    8. Peripheral polyneuropathy  Chronic, stable on current regimen. Followed by PCP.    9. Hyperlipidemia, unspecified hyperlipidemia type  Chronic, stable on current regimen. Followed by PCP / outside cardiology.    10. Benign prostatic hyperplasia, unspecified whether lower urinary tract symptoms present  Chronic, stable on current regimen. Followed by PCP.    11. Iron deficiency anemia, unspecified iron deficiency anemia type  Chronic, stable on current regimen. Followed by PCP.     12. Gastroesophageal reflux disease, unspecified whether esophagitis present  Chronic, stable on current regimen. Followed by GI.    13. Gout, unspecified cause, unspecified chronicity, unspecified site  Chronic, stable on current regimen. Followed by PCP.    14. Anxiety  Chronic, stable on current regimen. Followed by PCP.    15. Depression, unspecified depression type  Chronic, stable on current regimen. Followed by PCP.    16. Abnormality of gait and mobility  Chronic, stable. No recent falls. Does not use assistive devices. Followed by PCP.     17. Other reduced mobility  Chronic, stable. No recent falls. Does not use assistive devices. Followed by PCP.       Barbara Corral with a 5-10 year written  screening schedule and personal prevention plan. Recommendations were developed using the USPSTF age appropriate recommendations. Education, counseling, and referrals were provided as needed. After Visit Summary printed and given to patient which includes a list of additional screenings\tests needed.    Follow up in about 1 year (around 2/14/2024) for Medicare AWV and with PCP as instructed.       Jennie Angel NP    I offered to discuss advanced care planning, including how to pick a person who would make decisions for you if you were unable to make them for yourself, called a health care power of , and what kind of decisions you might make such as use of life sustaining treatments such as ventilators and tube feeding when faced with a life limiting illness recorded on a living will that they will need to know. (How you want to be cared for as you near the end of your natural life)     X  Patient has advanced directives written and agrees to provide copies to the institution.

## 2023-02-14 NOTE — PROGRESS NOTES
"  This is a 72 -year-old gentleman follow up for his medical problem.  Blood sugar has been running "pretty good"                . PMHx of HTN, acute kidney injury, kidney stones, and DM. PSHx of hernia repair and colonoscopy. Social hx of using skoal all day for the past 40 years. No change in diet. Looks like the first 2 weeks in NOv- but now they are coming back down.  Was in the 250's . No pill changes- same dose of trulicity.  He denies any polyuria.  NO recent steroids.  Got new machine and glucose have been doing better.               Diabetes mellitus type 2. He has had DM for 20 years.  He is on   Amaryl 4 mg in the morning, metformin, and trulicity.    . . His hemoglobin A1c 7.4 (11/2022) .  Recent GLu 125-180.   Trulicity was increased to 1.5 at last visit.  . He has been moving around more. He is able to work, but not exercising very much. No low Glucoses at home. He is having some nocturnal burning in feet.            2. He has chronic kidney disease, status post acute kidney injury back in  December 2013. Creatinine had gone up to 2.0 to 2.3. He had renal stones and    had lithotripsy last year. Most recent creatinine is 1.1 , putting him at  stage II chronic kidney disease.   He has renal stones and sees a urologist in [place].           3. He has hyperlipidemia, which he has had for several years. He is on  Lipitor. His cholesterol was 101 , HDL 38, LDL is 47, and triglycerides    are 80 - these are from 2/2022-- due to recheck.        4. He has OA of both knees, s/p right  knee replacement 2013. And left knee replacement in 2018.    Gong to try to see Ortho in [place].       5. Torn rotator cuffs- He is going to see MD in [facility] for his shoulder.  .  Did not have surgery.  He is getting dry needling.  this is fine        6. ENZO- using CPAP every night and is doing well. Feels well rested.  Sees his sleep doctor next visit.       7. htn -- bp is  controled to day-124/76-  Last visit we started " him on amlodipine 5mg a day.  He has all his supplies.          He has had several skin cancers removed.  Follows up regularly with his dermatologist in [place]. due next month.                 ROS : Gen - no fatigue -- he  gained 1   # since last visit--   Eyes - no eye pain  - he has had some blurriness. -   He has an appointment in Jan .    ENT - no hoarseness or sore throat  CV - No chest pain or SOB. NO palpitations.  Pulm - no cough or wheezing  GI - no N/V/D   no dysuria or incontinence  MS - no joint pain or muscle pain  Skin - no rash, or c/o of skin lesions  Neuro -  dizziness--- memory is doing well. -  Heme - no abnormal bleeding or bruising  Endo - no polydipsia, or temperature changes  Psych -  HE has been more stressed recently-- it is slow time of year for his buisness        PHYSICAL EXAMINATION:           /76 (BP Location: Left arm, Patient Position: Sitting, BP Method: Large (Manual))   Pulse 67   Ht 6' (1.829 m)   Wt 96.6 kg (212 lb 15.4 oz)   SpO2 95%   BMI 28.88 kg/m²           General appearance: alert, appears stated age and cooperative  Head: Normocephalic, without obvious abnormality, atraumatic-  He has a t shaped wound o his scalp-  Left front-- stood up under his tool box.    Ears: normal TM's and external ear canals both ears  Nose: Nares normal. Septum midline. Mucosa normal. No drainage or sinus tenderness.  Throat: lips, mucosa, and tongue normal; teeth and gums normal  Neck: no adenopathy, no carotid bruit, no JVD, supple, symmetrical, trachea midline and thyroid not enlarged, symmetric, no tenderness/mass/nodules  Back: symmetric, no curvature. ROM normal. No CVA tenderness.  Lungs: clear to auscultation bilaterally  Chest wall: no tenderness  Heart: regular rate and rhythm, S1, S2 normal, no murmur, click, rub or gallop  Abdomen: soft, non-tender; bowel sounds normal; no masses,  no organomegaly  Extremities: extremities normal, atraumatic, no cyanosis or  edema  Pulses: 2+ and symmetric  Skin: Skin color, texture, turgor normal. No rashes or lesions                  ASSESSMENT:  1. Diabetes mellitus type 2:  trulicity   WIll check hgb A1c and CMP first and also check UA to rule out UTI)  , Amaryl 4mg bid, metformin 1000mg Bid. We discussed appropriate diet and exercise. Discussed hypoglycemia.  He walks daily.  .      .  Work on diet and discussed exercise.     labs today -- follow up in 3 months       2. Anxiety-- He is cutting  down to using 1/2 xanax every morning-  .    stay on cymbalta. .drug  reviewed.  3. He has obstructive sleep apnea for which he uses CPAP. He was using it every night- he wake up well refreshed.    4. ED-- he has tried Viagra- cialias.    5. Back pain- seeing Chiropractor for this.- off NSAID due to h/o DEE.       6. Peripheral neuropathy.  -Had to come off   elavil  == He is on this due to his oa of the knees and shoulder pains.  He had ARF in 2013 on NSAIDs.  We took him off the Mobic because of acute renal failure and his chronic kidney disease. He is on gabapentin 300 mg a day- maybe at night-- will increase to 300 mg twice daily.   7. HTN- better on  amlodipine to 10  mg a day -    8.  episodic N/V-- this has resolved since Feb 2022  9. Coccydynia-  discuss sitting cushion.    10 PVD-- control bp, chol and diabetes.    11. Afib was in 2013-- no new episodes.    12.  Emphysematous changes are suggested.  Atelectasis and/or scar suggested at the lung bases.  4 mm solid nodule suggested in the right lower lobe of the lung (series 2, image 9. this appears essentially unchanged since 06/04/2014 abdomen pelvis CT and as such is favored to be benign.-- on CT of abdomen from 2/12/21-- will get screening ct of chest.          Paroxysmal atrial fibrillation    Pulmonary emphysema, unspecified emphysema type    Type II diabetes mellitus with neurological manifestations  -     Comprehensive Metabolic Panel; Future; Expected date: 02/14/2023  -      Hemoglobin A1C; Future; Expected date: 02/14/2023    Peripheral vascular disease, unspecified  -     Lipid Panel; Future; Expected date: 02/14/2023    Stage 3a chronic kidney disease    Screening PSA (prostate specific antigen)  -     PSA, Screening; Future; Expected date: 02/14/2023    Abnormal finding of blood chemistry, unspecified  -     Lipid Panel; Future; Expected date: 02/14/2023    Personal history of nicotine dependence  -     CT Chest Lung Screening Low Dose; Future; Expected date: 02/14/2023    Tobacco abuse    Other orders  -     gabapentin (NEURONTIN) 300 MG capsule; Take 1 capsule (300 mg total) by mouth 2 (two) times daily.  Dispense: 180 capsule; Refill: 3       Follow up in 6  months with labs

## 2023-02-14 NOTE — PATIENT INSTRUCTIONS
1. Follow up with Dr. Eddie Delaney MD as instructed.    2. SHINGRIX vaccines at pharmacy if interested.    3. Covid bivalent booster if interested.    Counseling and Referral of Other Preventative  (Italic type indicates deductible and co-insurance are waived)    Patient Name: Ellis Hatch  Today's Date: 2/14/2023    Health Maintenance         Date Due Completion Date    Pneumococcal Vaccines (Age 65+) (3 - PPSV23 if available, else PCV20) 08/15/2021 1/11/2018    Shingles Vaccine (3 of 3) 02/06/2022 12/12/2021    COVID-19 Vaccine (5 - Booster for Pfizer series) 08/26/2022 7/1/2022    Lipid Panel 02/10/2023 2/10/2022    Eye Exam 05/24/2023 5/24/2022    Override on 2/24/2022: Done ([facility] [place]    Override on 5/17/2017: Done (in [place]- will fax me report)    Override on 12/15/2015: Done    Override on 4/8/2014: (N/S) (outside)    Hemoglobin A1c 05/29/2023 11/29/2022    Foot Exam 07/01/2023 7/1/2022    Override on 10/14/2021: Done    Override on 3/4/2015: Done    Diabetes Urine Screening 11/29/2023 11/29/2022    Low Dose Statin 02/13/2024 2/13/2023    Colorectal Cancer Screening 03/05/2024 3/5/2021    Override on 4/16/2008: (N/S)    TETANUS VACCINE 04/19/2032 4/19/2022          No orders of the defined types were placed in this encounter.      The following information is provided to all patients.  This information is to help you find resources for any of the problems found today that may be affecting your health:                Living healthy guide: www.[facility].[place].gov      Understanding Diabetes: www.diabetes.org      Eating healthy: www.cdc.gov/healthyweight      CDC home safety checklist: www.cdc.gov/steadi/patient.html      Agency on Aging: www.goea.[place].gov      Alcoholics anonymous (AA): www.aa.org      Physical Activity: www.kasia.nih.gov/dp6bujb      Tobacco use: www.quitwithusla.org

## 2023-02-15 ENCOUNTER — TELEPHONE (OUTPATIENT)
Dept: INTERNAL MEDICINE | Facility: CLINIC | Age: 73
End: 2023-02-15
Payer: MEDICARE

## 2023-02-15 DIAGNOSIS — E11.9 TYPE 2 DIABETES MELLITUS WITHOUT COMPLICATION, WITHOUT LONG-TERM CURRENT USE OF INSULIN: ICD-10-CM

## 2023-02-15 DIAGNOSIS — E11.49 TYPE II DIABETES MELLITUS WITH NEUROLOGICAL MANIFESTATIONS: Primary | ICD-10-CM

## 2023-02-15 RX ORDER — DULAGLUTIDE 3 MG/.5ML
3 INJECTION, SOLUTION SUBCUTANEOUS
Qty: 12 PEN | Refills: 3 | Status: SHIPPED | OUTPATIENT
Start: 2023-02-15 | End: 2023-11-20

## 2023-02-15 NOTE — TELEPHONE ENCOUNTER
Pt notified and updated about results and new rx sent. Pt also scheduled for labs and f/u visit.     Nereyda DELCID

## 2023-02-15 NOTE — TELEPHONE ENCOUNTER
----- Message from Karen Long sent at 2/15/2023  4:03 PM CST -----  Contact: [phone]  Patient is returning a phone call.  Who left a message for the patient: Nereyda Car MA  Does patient know what this is regarding:  yes   Would you like a call back, or a response through your [facility] portal?:   call back   Comments:

## 2023-02-15 NOTE — TELEPHONE ENCOUNTER
Please call-- glucoses is worse.  Please have him increase trulicity to 3 mg a week-- I sent new rx in   Also get a bmp and hgb A1c prior to his next visit

## 2023-02-16 ENCOUNTER — TELEPHONE (OUTPATIENT)
Dept: INTERNAL MEDICINE | Facility: CLINIC | Age: 73
End: 2023-02-16
Payer: MEDICARE

## 2023-02-16 NOTE — TELEPHONE ENCOUNTER
----- Message from Katerin Rachel sent at 2/16/2023 10:11 AM CST -----  Contact: [phone] Patient  Pt is calling in regards to he already takes 2 gabapentin a day. Pt stated Dr Delaney had told him to start taking 2 a day but the pt already does take 2 a day. Please call and advise.

## 2023-02-17 ENCOUNTER — HOSPITAL ENCOUNTER (OUTPATIENT)
Dept: RADIOLOGY | Facility: HOSPITAL | Age: 73
Discharge: HOME | End: 2023-02-17
Attending: INTERNAL MEDICINE
Payer: MEDICARE

## 2023-02-17 DIAGNOSIS — Z87.891 PERSONAL HISTORY OF NICOTINE DEPENDENCE: ICD-10-CM

## 2023-02-17 PROCEDURE — 71271 CT CHEST LUNG SCREENING LOW DOSE: ICD-10-PCS | Mod: 26,,, | Performed by: RADIOLOGY

## 2023-02-17 PROCEDURE — 71271 CT THORAX LUNG CANCER SCR C-: CPT | Mod: 26,,, | Performed by: RADIOLOGY

## 2023-02-17 PROCEDURE — 71271 CT THORAX LUNG CANCER SCR C-: CPT | Mod: TC

## 2023-02-22 NOTE — TELEPHONE ENCOUNTER
----- Message from Iris Kline sent at 2/22/2023  2:19 PM CST -----  Contact: Yesi ZAMUDIO [facility] office [phone]  Yesi is calling to get lab report and the sleep test send over to Fax # [phone] or [phone]

## 2023-02-23 NOTE — TELEPHONE ENCOUNTER
----- Message from Naomi Ruffin sent at 2/23/2023  1:24 PM CST -----  Contact: Shruthi spouse [phone]  Patient is returning a phone call.  Who left a message for the patient: Fanta  Does patient know what this is regarding:    Would you like a call back, or a response through your [facility] portal? Call back  Comments: Pt's spouse was returning the nurses call

## 2023-02-23 NOTE — TELEPHONE ENCOUNTER
No new care gaps identified.  [facility] Embedded Care Gaps. Reference number: [id number]. 2/23/2023   2:24:22 PM CST

## 2023-02-24 RX ORDER — ALPRAZOLAM 0.5 MG/1
0.5 TABLET ORAL 3 TIMES DAILY
Qty: 15 TABLET | Refills: 2 | OUTPATIENT
Start: 2023-02-24

## 2023-04-05 RX ORDER — ALPRAZOLAM 0.5 MG/1
0.5 TABLET ORAL 3 TIMES DAILY
Qty: 15 TABLET | Refills: 0 | Status: SHIPPED | OUTPATIENT
Start: 2023-04-05 | End: 2023-05-31

## 2023-04-05 NOTE — TELEPHONE ENCOUNTER
----- Message from Iris Kline sent at 4/5/2023 10:39 AM CDT -----  Contact: [phone]  Requesting an RX refill or new RX.  Is this a refill or new RX: refill  RX name and strength (copy/paste from chart):  ALPRAZolam (XANAX) 0.5 MG tablet  Is this a 30 day or 90 day RX:   Pharmacy name and phone # (copy/paste from chart):    [facility] - [address] [address]  Phone: [phone] Fax: [phone]      The doctors have asked that we provide their patients with the following 2 reminders -- prescription refills can take up to 72 hours, and a friendly reminder that in the future you can use your [facility] account to request refills: call back

## 2023-04-05 NOTE — TELEPHONE ENCOUNTER
No new care gaps identified.  [facility] Embedded Care Gaps. Reference number: [id number]. 4/05/2023   10:56:30 AM CDT

## 2023-05-01 RX ORDER — AMLODIPINE BESYLATE 10 MG/1
10 TABLET ORAL DAILY
Qty: 90 TABLET | Refills: 3 | Status: SHIPPED | OUTPATIENT
Start: 2023-05-01 | End: 2024-02-20 | Stop reason: SDUPTHER

## 2023-05-01 NOTE — TELEPHONE ENCOUNTER
----- Message from Sumi Carvajal sent at 5/1/2023 11:47 AM CDT -----  Contact: Ertjeqy-[phone]    Requesting an RX refill or new RX.-Ellis Hatch -    Is this a refill or new RX: - Refill-    RX name and strength (amLODIPine (NORVASC) 10 MG tablet)    Is this a 30 day or 90 day RX: -10 Mg tablet-    Pharmacy name and phone # (    [facility] - [place], [place] - [id number] [address]    Phone: [phone] Fax: [phone]    The doctors have asked that we provide their patients with the following 2 reminders -- prescription refills can take up to 72 hours, and a friendly reminder that in the future you can use your [facility] account to request refills:- Call back-    Comments: Please call the patient back to advise.

## 2023-05-01 NOTE — TELEPHONE ENCOUNTER
Care Due:                  Date            Visit Type   Department     Provider  --------------------------------------------------------------------------------                                EP -                              PRIMARY      [facility] INTERNAL  Last Visit: 02-      CARE ([facility])   LUCIA Delaney                              [facility]                              PRIMARY      [facility] INTERNAL  Next Visit: 05-      CARE ([facility])   LUCIA Delaney                                                            Last  Test          Frequency    Reason                     Performed    Due Date  --------------------------------------------------------------------------------    CBC.........  12 months..  allopurinoL..............  02- 01-    Uric Acid...  12 months..  allopurinoL..............  Not Found    Overdue    [employer] Embedded Care Due Messages. Reference number: [id number].   5/01/2023 11:57:38 AM CDT

## 2023-05-10 ENCOUNTER — LAB VISIT (OUTPATIENT)
Dept: LAB | Facility: HOSPITAL | Age: 73
End: 2023-05-10
Attending: INTERNAL MEDICINE
Payer: MEDICARE

## 2023-05-10 DIAGNOSIS — E11.49 TYPE II DIABETES MELLITUS WITH NEUROLOGICAL MANIFESTATIONS: ICD-10-CM

## 2023-05-10 LAB
ANION GAP SERPL CALC-SCNC: 11 MMOL/L (ref 8–16)
BUN SERPL-MCNC: 21 MG/DL (ref 8–23)
CALCIUM SERPL-MCNC: 9.7 MG/DL (ref 8.7–10.5)
CHLORIDE SERPL-SCNC: 106 MMOL/L (ref 95–110)
CO2 SERPL-SCNC: 21 MMOL/L (ref 23–29)
CREAT SERPL-MCNC: 1.1 MG/DL (ref 0.5–1.4)
EST. GFR  (NO RACE VARIABLE): >60 ML/MIN/1.73 M^2
ESTIMATED AVG GLUCOSE: 169 MG/DL (ref 68–131)
GLUCOSE SERPL-MCNC: 173 MG/DL (ref 70–110)
HBA1C MFR BLD: 7.5 % (ref 4–5.6)
POTASSIUM SERPL-SCNC: 4.5 MMOL/L (ref 3.5–5.1)
SODIUM SERPL-SCNC: 138 MMOL/L (ref 136–145)

## 2023-05-10 PROCEDURE — 83036 HEMOGLOBIN GLYCOSYLATED A1C: CPT | Performed by: INTERNAL MEDICINE

## 2023-05-10 PROCEDURE — 80048 BASIC METABOLIC PNL TOTAL CA: CPT | Performed by: INTERNAL MEDICINE

## 2023-05-10 PROCEDURE — 36415 COLL VENOUS BLD VENIPUNCTURE: CPT | Performed by: INTERNAL MEDICINE

## 2023-05-11 RX ORDER — ALLOPURINOL 100 MG/1
TABLET ORAL
Qty: 90 TABLET | Refills: 0 | Status: SHIPPED | OUTPATIENT
Start: 2023-05-11 | End: 2023-10-13

## 2023-05-11 NOTE — TELEPHONE ENCOUNTER
----- Message from Jeffersonsarah Osmel sent at 5/11/2023 10:58 AM CDT -----  Contact: self [phone]  Requesting an RX refill or new RX.  Is this a refill or new RX: refill  RX name and strength (copy/paste from chart): allopurinoL (ZYLOPRIM) 100 MG tablet   Is this a 30 day or 90 day RX:   Pharmacy name and phone # (copy/paste from chart):    [facility] - [place] [id number] [address]  Phone: [phone] Fax: [phone]      The doctors have asked that we provide their patients with the following 2 reminders -- prescription refills can take up to 72 hours, and a friendly reminder that in the future you can use your [facility] account to request refills: yes    Please call and advise

## 2023-05-11 NOTE — TELEPHONE ENCOUNTER
No care due was identified.  [facility] Embedded Care Due Messages. Reference number: [id number].   5/11/2023 10:27:39 AM CDT

## 2023-05-11 NOTE — TELEPHONE ENCOUNTER
Refill Routing Note   Medication(s) are not appropriate for processing by [facility] for the following reason(s):      Required labs outdated    [facility] action(s):  Defer None identified          Appointments  past 12m or future 3m with PCP    Date Provider   Last Visit   2/14/2023 Eddie Delaney Jr., MD   Next Visit   5/16/2023 Eddie Delaney Jr., MD   ED visits in past 90 days: 0        Note composed:11:07 AM 05/11/2023

## 2023-05-16 ENCOUNTER — OFFICE VISIT (OUTPATIENT)
Dept: INTERNAL MEDICINE | Facility: CLINIC | Age: 73
End: 2023-05-16
Payer: MEDICARE

## 2023-05-16 VITALS
BODY MASS INDEX: 27.92 KG/M2 | HEIGHT: 72 IN | SYSTOLIC BLOOD PRESSURE: 130 MMHG | WEIGHT: 206.13 LBS | HEART RATE: 72 BPM | DIASTOLIC BLOOD PRESSURE: 82 MMHG | OXYGEN SATURATION: 95 %

## 2023-05-16 DIAGNOSIS — F41.9 ANXIETY: Primary | ICD-10-CM

## 2023-05-16 DIAGNOSIS — J43.9 PULMONARY EMPHYSEMA, UNSPECIFIED EMPHYSEMA TYPE: ICD-10-CM

## 2023-05-16 DIAGNOSIS — E78.5 HYPERLIPIDEMIA, UNSPECIFIED HYPERLIPIDEMIA TYPE: ICD-10-CM

## 2023-05-16 DIAGNOSIS — M10.9 GOUT, UNSPECIFIED CAUSE, UNSPECIFIED CHRONICITY, UNSPECIFIED SITE: ICD-10-CM

## 2023-05-16 DIAGNOSIS — E11.9 TYPE 2 DIABETES MELLITUS WITHOUT COMPLICATION, WITHOUT LONG-TERM CURRENT USE OF INSULIN: ICD-10-CM

## 2023-05-16 DIAGNOSIS — E11.49 TYPE II DIABETES MELLITUS WITH NEUROLOGICAL MANIFESTATIONS: ICD-10-CM

## 2023-05-16 DIAGNOSIS — I48.0 PAROXYSMAL ATRIAL FIBRILLATION: ICD-10-CM

## 2023-05-16 PROBLEM — K83.8 BILIARY SLUDGE: Status: RESOLVED | Noted: 2021-02-14 | Resolved: 2023-05-16

## 2023-05-16 PROBLEM — R19.7 DIARRHEA: Status: RESOLVED | Noted: 2022-02-03 | Resolved: 2023-05-16

## 2023-05-16 PROCEDURE — 99999 PR PBB SHADOW E&M-EST. PATIENT-LVL III: ICD-10-PCS | Mod: PBBFAC,,, | Performed by: INTERNAL MEDICINE

## 2023-05-16 PROCEDURE — 99214 PR OFFICE/OUTPT VISIT, EST, LEVL IV, 30-39 MIN: ICD-10-PCS | Mod: S$PBB,,, | Performed by: INTERNAL MEDICINE

## 2023-05-16 PROCEDURE — 99213 OFFICE O/P EST LOW 20 MIN: CPT | Mod: PBBFAC | Performed by: INTERNAL MEDICINE

## 2023-05-16 PROCEDURE — 99999 PR PBB SHADOW E&M-EST. PATIENT-LVL III: CPT | Mod: PBBFAC,,, | Performed by: INTERNAL MEDICINE

## 2023-05-16 PROCEDURE — 99214 OFFICE O/P EST MOD 30 MIN: CPT | Mod: S$PBB,,, | Performed by: INTERNAL MEDICINE

## 2023-05-16 NOTE — PROGRESS NOTES
"  This is a 73 -year-old gentleman follow up for his medical problem.  Blood sugar has been running "pretty good"                . PMHx of HTN, acute kidney injury, kidney stones, and DM. PSHx of hernia repair and colonoscopy. Social hx of using skoal all day for the past 40 years. No change in diet. Looks like the first 2 weeks in NOv- but now they are coming back down.  Was in the 250's . No pill changes- same dose of trulicity.  He denies any polyuria.  NO recent steroids.  Got new machine and glucose have been doing better.               Diabetes mellitus type 2. He has had DM for 20 years.  He is on   Amaryl 4 mg in the morning, metformin, and trulicity.    . . His hemoglobin A1c 7.5 (5/2023) .  Recent GLu 125-170.   Trulicity was increased to 3 mg at last visit.  . He has been moving around more. He is able to work, but not exercising very much. No low Glucoses at home. He is having some nocturnal burning in feet.            2. He has chronic kidney disease, status post acute kidney injury back in  December 2013. Creatinine had gone up to 2.0 to 2.3 twice I the past  (2014 & 2018) . He had renal stones and    had lithotripsy last year. Most recent creatinine is 1.1 , putting him at  stage II chronic kidney disease.   He has renal stones and sees a urologist in [place].           3. He has hyperlipidemia, which he has had for several years. He is on  Lipitor. His cholesterol was 113,, HDL 41 , LDL is 45, and triglycerides    are 80 - these are from 2/2022-- due to recheck.        4. He has OA of both knees, s/p right  knee replacement 2013. And left knee replacement in 2018.    Gong to try to see Ortho in [place].       5. Torn rotator cuffs- He is going to see MD in [facility] for his shoulder.  .  Did not have surgery.  He is getting dry needling.  this is fine        6. ENZO- using CPAP every night and is doing well. Feels well rested.  Sees his sleep doctor next visit.       7. htn -- bp is  controled to " day-130/82  Last visit we started him on amlodipine 5mg a day.  He has all his supplies.          He has had several skin cancers removed.  Follows up regularly with his dermatologist in [place]. due next month.                 ROS : Gen - no fatigue -- he  lost 6  # since last visit--   Eyes - no eye pain  - he has had some blurriness. -   He has an appointment in Jan .    ENT - no hoarseness or sore throat  CV - No chest pain or SOB. NO palpitations.  Pulm - no cough or wheezing  GI - no N/V/D   no dysuria or incontinence  MS - no joint pain or muscle pain  Skin - no rash, or c/o of skin lesions  Neuro -  dizziness--- memory is doing well. -  Heme - no abnormal bleeding or bruising  Endo - no polydipsia, or temperature changes  Psych -  HE has been more stressed recently-- it is slow time of year for his buisness        PHYSICAL EXAMINATION:   /82 (BP Location: Right arm, Patient Position: Sitting, BP Method: Medium (Manual))   Pulse 72   Ht 6' (1.829 m)   Wt 93.5 kg (206 lb 2.1 oz)   SpO2 95%   BMI 27.96 kg/m²            General appearance: alert, appears stated age and cooperative  Head: Normocephalic, without obvious abnormality, atraumatic-  He has a t shaped wound o his scalp-  Left front-- stood up under his tool box.    Ears: normal TM's and external ear canals both ears  Nose: Nares normal. Septum midline. Mucosa normal. No drainage or sinus tenderness.  Throat: lips, mucosa, and tongue normal; teeth and gums normal  Neck: no adenopathy, no carotid bruit, no JVD, supple, symmetrical, trachea midline and thyroid not enlarged, symmetric, no tenderness/mass/nodules  Back: symmetric, no curvature. ROM normal. No CVA tenderness.  Lungs: clear to auscultation bilaterally  Chest wall: no tenderness  Heart: regular rate and rhythm, S1, S2 normal, no murmur, click, rub or gallop  Abdomen: soft, non-tender; bowel sounds normal; no masses,  no organomegaly  Extremities: extremities normal, atraumatic,  no cyanosis or edema  Pulses: 2+ and symmetric  Skin: Skin color, texture, turgor normal. No rashes or lesions                  ASSESSMENT:  1. Diabetes mellitus type 2:  trulicity 3mg /week   WIll check hgb A1c and CMP first and also check UA to rule out UTI)  , Amaryl 4mg bid, metformin 1000mg Bid. We discussed appropriate diet and exercise. Discussed hypoglycemia.  He walks daily.  .      .  Work on diet and discussed exercise.     labs today -- follow up in 3 months       2. Anxiety-- He is cutting  down to using 1/2 xanax every morning-  .    stay on cymbalta. .drug  reviewed.  3. He has obstructive sleep apnea for which he uses CPAP. He was using it every night- he wake up well refreshed.    4. ED-- he has tried Viagra- cialias.    5. Back pain- seeing Chiropractor for this.- off NSAID due to h/o DEE.       6. Peripheral neuropathy.  -Had to come off   elavil  == He is on this due to his oa of the knees and shoulder pains.  He had ARF in 2013 on NSAIDs.  We took him off the Mobic because of acute renal failure and his chronic kidney disease. He is on gabapentin 300 mg a day- maybe at night-- will increase to 300 mg twice daily.   7. HTN- better on  amlodipine to 10  mg a day -    8.  episodic N/V-- this has resolved since Feb 2022  9. Coccydynia-  discuss sitting cushion.    10 PVD-- control bp, chol and diabetes.    11. Afib was in 2013-- no new episodes.    12.  Emphysematous changes are suggested.  Atelectasis and/or scar suggested at the lung bases.  4 mm solid nodule suggested in the right lower lobe of the lung (series 2, image 9. this appears essentially unchanged since 06/04/2014 abdomen pelvis CT and as such is favored to be benign.-- on CT of abdomen from 2/12/21-- will get screening ct of chest.            Emphysematous changes seen on CT-- needs to stop smoking.       Tobacco abuse---needs to stop smoking.           gets his [place] and shingles in the fall.       Follow up in 6  months with  labs

## 2023-05-19 ENCOUNTER — TELEPHONE (OUTPATIENT)
Dept: INTERNAL MEDICINE | Facility: CLINIC | Age: 73
End: 2023-05-19
Payer: MEDICARE

## 2023-05-19 RX ORDER — METFORMIN HYDROCHLORIDE 500 MG/1
TABLET ORAL
Qty: 360 TABLET | Refills: 1 | Status: SHIPPED | OUTPATIENT
Start: 2023-05-19 | End: 2023-11-21

## 2023-05-19 NOTE — TELEPHONE ENCOUNTER
Refill Decision Note   Ellis Hatch  is requesting a refill authorization.  Brief Assessment and Rationale for Refill:  Approve     Medication Therapy Plan:       Medication Reconciliation Completed: No   Comments:     No Care Gaps recommended.     Note composed:12:35 PM 05/19/2023

## 2023-05-19 NOTE — TELEPHONE ENCOUNTER
----- Message from Isa Kevin sent at 5/19/2023 12:11 PM CDT -----  Contact: [phone] pt's wife Shruthi  Pt is needing a refill on his metFORMIN (GLUCOPHAGE) 500 MG tablet 360 tablet. Pt is using   [facility] - [address] [address]  Phone: [phone] Fax: [phone]            Thank you

## 2023-05-19 NOTE — TELEPHONE ENCOUNTER
No care due was identified.  [facility] Embedded Care Due Messages. Reference number: [id number].   5/19/2023 12:13:15 PM CDT

## 2023-05-19 NOTE — ASSESSMENT & PLAN NOTE
"Seen on previous lung CT in 2021-- DIscussed smoking to day-- still smoke "occasionally.  Discussed quitting.     " Subjective   Patient ID: Teddy Todd is a 84 y.o. male who presents for Follow-up (Here for a 3 mo check up ).    Assessment/Plan   Problem List Items Addressed This Visit    None  Visit Diagnoses       Diabetic retinopathy screening        Routine general medical examination at a health care facility            83-year-old here for  follow up, last Medicare wellness in February 23     Body aches-discontinue simvastatin to see if it gets resolved  If not further work-up like checking CPK and empiric treatment with prednisone may be indicated    Diarrhea-discontinue metformin.  Empirically treated with Cipro and Flagyl for 1 week and patient will use over-the-counter Imodium and Metamucil.  If does not improve, will need to be referred to GI     left shoulder pain- improved with physical therapy      Diabetes- on metformin and glipizide. Recently elevated home sugar numbers, check A1c.      Hypertension- on metoprolol and lisinopril. Refill medication.      Hyperlipidemia -simvastatin. Check lipid panel     Hypothyroidism- levothyroxine. Check TSH     Lab work    Source of history: Nurse, Medical personnel, Medical record, Patient.  History limitation: None.    HPI    No Known Allergies  This is a 83-year-old man here for  follow up     Reports diarrhea which according to him is ongoing for some time  No blood or fever or any other red flags        Past medical history includes T2 DM, metabolic syndrome, hypertension, hyperlipidemia, hypothyroidism, hearing impairment, UTI, back pain with left lower extremity radiculopathy, and bilateral shoulder discomfort.     Social history  Drinks 5-6 drinks a year. Denies smoking or drugs     Family history is noncontributory   Current Outpatient Medications   Medication Sig Dispense Refill    glipiZIDE (GlucotroL) 5 mg tablet Take 1 tablet daily 90 tablet 1    levothyroxine (Synthroid, Levoxyl) 25 mcg tablet Take 1 tablet (25 mcg) by mouth once daily.      lisinopril 10 mg  "tablet Take 1 tablet (10 mg) by mouth once daily.      metFORMIN (Glucophage) 1,000 mg tablet Take 1 tablet (1,000 mg) by mouth every 12 hours.      metoprolol tartrate (Lopressor) 25 mg tablet Take 1 tablet (25 mg) by mouth once daily.      OneTouch Ultra Test strip TEST ONCE DAILY      OneTouch Ultra2 Meter misc TEST ONCE A DAY AS DIRECTED      OneTouch UltraSoft Lancets misc TEST BLOOD SUGAR ONCE A DAY      simvastatin (Zocor) 20 mg tablet Take 1 tablet (20 mg) by mouth once daily at bedtime. 90 tablet 1    cyclobenzaprine (Flexeril) 10 mg tablet Take 1 tablet (10 mg) by mouth once daily at bedtime.       No current facility-administered medications for this visit.       Objective   Visit Vitals  /70   Ht 1.753 m (5' 9\")   Wt 65.6 kg (144 lb 9.6 oz)   BMI 21.35 kg/m²   Smoking Status Never   BSA 1.79 m²     Physical Exam  Vital signs as per nursing/MA documentation  General appearance: Alert and in no acute distress  HEENT: Normal Inspection  Neck - Normal Inspection  Respiratory : No respiratory distress. Lungs are clear   Cardiovascular: heart rate normal. No gallop  Back - normal inspection  Skin inspection:Warm  Musculoskeletal : No deformities  Neuro : Limited exam. Baseline    Review of Systems   Comprehensive review of systems as allowed by patient condition and nursing input is negative    Results including lab work, imaging reports were reviewed and wherever possible, independently verified    No family history on file.  Social History     Socioeconomic History    Marital status:      Spouse name: None    Number of children: None    Years of education: None    Highest education level: None   Occupational History    None   Tobacco Use    Smoking status: Never    Smokeless tobacco: Never   Vaping Use    Vaping status: None   Substance and Sexual Activity    Alcohol use: Not Currently    Drug use: None    Sexual activity: None   Other Topics Concern    None   Social History Narrative    None "     Social Determinants of Health     Financial Resource Strain: Not on file   Food Insecurity: Not on file   Transportation Needs: Not on file   Physical Activity: Not on file   Stress: Not on file   Social Connections: Not on file   Intimate Partner Violence: Not on file   Housing Stability: Not on file     Past Medical History:   Diagnosis Date    Hyperlipidemia, unspecified 04/30/2021    Hyperlipidemia    Metabolic syndrome 03/06/2018    Dysmetabolic syndrome    Personal history of other diseases of the circulatory system     History of hypertension    Personal history of other endocrine, nutritional and metabolic disease 11/06/2014    History of hypothyroidism    Personal history of urinary (tract) infections 11/05/2013    Personal history of urinary tract infection    Type 2 diabetes mellitus without complications (CMS/[facility]) 08/15/2022    Type 2 diabetes mellitus     History reviewed. No pertinent surgical history.    Charting was completed using voice recognition technology and may include unintended errors.

## 2023-05-23 NOTE — TELEPHONE ENCOUNTER
No care due was identified.  [facility] Embedded Care Due Messages. Reference number: [id number].   5/23/2023 10:51:07 AM CDT

## 2023-05-23 NOTE — TELEPHONE ENCOUNTER
----- Message from Wanda Louie sent at 5/23/2023 10:29 AM CDT -----  Contact: Shruthi(wife)[phone]  Requesting an RX refill or new RX.  Is this a refill or new RX: refill  RX name and strength (copy/paste from chart):  ALPRAZolam (XANAX) 0.5 MG tablet  Is this a 30 day or 90 day RX:   Pharmacy name and phone # (copy/paste from chart):    [facility] - [place] [id number] [address]  Phone: [phone] Fax: [phone]     The doctors have asked that we provide their patients with the following 2 reminders -- prescription refills can take up to 72 hours, and a friendly reminder that in the future you can use your [facility] account to request refills: yes

## 2023-05-26 RX ORDER — ALPRAZOLAM 0.5 MG/1
0.5 TABLET ORAL 3 TIMES DAILY
Qty: 15 TABLET | Refills: 0 | OUTPATIENT
Start: 2023-05-26

## 2023-05-31 RX ORDER — ALPRAZOLAM 0.5 MG/1
TABLET ORAL
Qty: 15 TABLET | Refills: 2 | Status: SHIPPED | OUTPATIENT
Start: 2023-05-31 | End: 2023-08-08

## 2023-05-31 NOTE — TELEPHONE ENCOUNTER
----- Message from Nan Bolivar sent at 5/31/2023 12:03 PM CDT -----  Contact: [phone]  Pt requested medication last week Pharmacy reached out but no call returned      Requesting an RX refill or new RX.  Is this a refill or new RX: refill  RX name and strength (copy/paste from chart):  ALPRAZolam (XANAX) 0.5 MG tablet  Is this a 30 day or 90 day RX: 30  Pharmacy name and phone # (copy/paste from chart):    [facility] - [place] [id number] [address]  Phone: [phone] Fax: [phone]      The doctors have asked that we provide their patients with the following 2 reminders -- prescription refills can take up to 72 hours, and a friendly reminder that in the future you can use your [facility] account to request refills:

## 2023-05-31 NOTE — TELEPHONE ENCOUNTER
No care due was identified.  [facility] Embedded Care Due Messages. Reference number: [id number].   5/31/2023 12:02:57 PM CDT

## 2023-06-06 RX ORDER — MELOXICAM 15 MG/1
TABLET ORAL
Qty: 30 TABLET | Refills: 2 | Status: SHIPPED | OUTPATIENT
Start: 2023-06-06 | End: 2023-09-18 | Stop reason: SDUPTHER

## 2023-06-09 RX ORDER — GLIMEPIRIDE 4 MG/1
TABLET ORAL
Qty: 180 TABLET | Refills: 1 | Status: SHIPPED | OUTPATIENT
Start: 2023-06-09 | End: 2023-12-14

## 2023-06-09 NOTE — TELEPHONE ENCOUNTER
Refill Decision Note   Ellis Hatch  is requesting a refill authorization.  Brief Assessment and Rationale for Refill:  Approve     Medication Therapy Plan:         Comments:     Note composed:9:48 AM 06/09/2023             Appointments     Last Visit   5/16/2023 Eddie Delaney Jr., MD   Next Visit   11/20/2023 Eddie Delaney Jr., MD

## 2023-06-09 NOTE — TELEPHONE ENCOUNTER
No care due was identified.  [facility] Embedded Care Due Messages. Reference number: [id number].   6/09/2023 8:04:35 AM CDT

## 2023-07-22 RX ORDER — PANTOPRAZOLE SODIUM 40 MG/1
TABLET, DELAYED RELEASE ORAL
Qty: 90 TABLET | Refills: 3 | Status: SHIPPED | OUTPATIENT
Start: 2023-07-22 | End: 2024-02-20 | Stop reason: SDUPTHER

## 2023-07-23 NOTE — TELEPHONE ENCOUNTER
Refill Decision Note   Ellis Hatch  is requesting a refill authorization.  Brief Assessment and Rationale for Refill:  Approve     Medication Therapy Plan:       Medication Reconciliation Completed: No   Comments:     No Care Gaps recommended.     Note composed:9:23 PM 07/22/2023

## 2023-07-25 ENCOUNTER — TELEPHONE (OUTPATIENT)
Dept: PODIATRY | Facility: CLINIC | Age: 73
End: 2023-07-25
Payer: MEDICARE

## 2023-07-27 ENCOUNTER — OFFICE VISIT (OUTPATIENT)
Dept: PODIATRY | Facility: CLINIC | Age: 73
End: 2023-07-27
Payer: MEDICARE

## 2023-07-27 VITALS
BODY MASS INDEX: 27.9 KG/M2 | HEIGHT: 72 IN | HEART RATE: 81 BPM | DIASTOLIC BLOOD PRESSURE: 70 MMHG | SYSTOLIC BLOOD PRESSURE: 116 MMHG | WEIGHT: 206 LBS

## 2023-07-27 DIAGNOSIS — E11.9 COMPREHENSIVE DIABETIC FOOT EXAMINATION, TYPE 2 DM, ENCOUNTER FOR: ICD-10-CM

## 2023-07-27 DIAGNOSIS — M20.42 HAMMER TOE OF LEFT FOOT: ICD-10-CM

## 2023-07-27 DIAGNOSIS — L97.521 ULCER OF LEFT FOOT, LIMITED TO BREAKDOWN OF SKIN: ICD-10-CM

## 2023-07-27 DIAGNOSIS — E11.49 TYPE II DIABETES MELLITUS WITH NEUROLOGICAL MANIFESTATIONS: Primary | ICD-10-CM

## 2023-07-27 PROCEDURE — 99213 PR OFFICE/OUTPT VISIT, EST, LEVL III, 20-29 MIN: ICD-10-PCS | Mod: S$PBB,,, | Performed by: PODIATRIST

## 2023-07-27 PROCEDURE — 99214 OFFICE O/P EST MOD 30 MIN: CPT | Mod: PBBFAC,PN | Performed by: PODIATRIST

## 2023-07-27 PROCEDURE — 99213 OFFICE O/P EST LOW 20 MIN: CPT | Mod: S$PBB,,, | Performed by: PODIATRIST

## 2023-07-27 PROCEDURE — 99999 PR PBB SHADOW E&M-EST. PATIENT-LVL IV: ICD-10-PCS | Mod: PBBFAC,,, | Performed by: PODIATRIST

## 2023-07-27 PROCEDURE — 99999 PR PBB SHADOW E&M-EST. PATIENT-LVL IV: CPT | Mod: PBBFAC,,, | Performed by: PODIATRIST

## 2023-07-30 PROBLEM — M20.42 HAMMER TOE OF LEFT FOOT: Status: ACTIVE | Noted: 2023-07-30

## 2023-07-30 PROBLEM — E11.9 COMPREHENSIVE DIABETIC FOOT EXAMINATION, TYPE 2 DM, ENCOUNTER FOR: Status: ACTIVE | Noted: 2023-07-30

## 2023-07-30 PROBLEM — L97.521 ULCER OF LEFT FOOT, LIMITED TO BREAKDOWN OF SKIN: Status: ACTIVE | Noted: 2023-07-30
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
## 2023-08-08 RX ORDER — ALPRAZOLAM 0.5 MG/1
TABLET ORAL
Qty: 15 TABLET | Refills: 2 | Status: SHIPPED | OUTPATIENT
Start: 2023-08-08 | End: 2023-12-04

## 2023-08-08 NOTE — TELEPHONE ENCOUNTER
Care Due:                  Date            Visit Type   Department     Provider  --------------------------------------------------------------------------------                                EP -                              PRIMARY      [facility] INTERNAL  Last Visit: 05-      CARE ([facility])   LUCIA Delaney                              [facility]                              PRIMARY      [facility] INTERNAL  Next Visit: 11-      CARE ([facility])   [facility]       Eddie Delaney                                                            Last  Test          Frequency    Reason                     Performed    Due Date  --------------------------------------------------------------------------------    CBC.........  12 months..  allopurinoL..............  02- 01-    Uric Acid...  12 months..  allopurinoL..............  Not Found    Overdue    [employer] Embedded Care Due Messages. Reference number: [id number].   8/08/2023 11:27:53 AM CDT

## 2023-08-15 RX ORDER — DULOXETIN HYDROCHLORIDE 60 MG/1
CAPSULE, DELAYED RELEASE ORAL
Qty: 90 CAPSULE | Refills: 3 | Status: SHIPPED | OUTPATIENT
Start: 2023-08-15 | End: 2024-02-20 | Stop reason: SDUPTHER

## 2023-08-15 NOTE — TELEPHONE ENCOUNTER
Provider Staff:  Action required for this patient     Please see care gap opportunities below in Care Due Message.    Thanks!  [facility]     Appointments      Date Provider   Last Visit   5/16/2023 Eddie Delaney Jr., MD   Next Visit   11/20/2023 Eddie Delaney Jr., MD     Refill Decision Note   Ellis Hatch  is requesting a refill authorization.  Brief Assessment and Rationale for Refill:  Approve     Medication Therapy Plan:         Comments:     Note composed:11:31 AM 08/15/2023            
Care Due:                  Date            Visit Type   Department     Provider  --------------------------------------------------------------------------------                                EP -                              PRIMARY      [facility] INTERNAL  Last Visit: 05-      CARE ([facility])   LUCIA Delaney                              [facility]                              PRIMARY      [facility] INTERNAL  Next Visit: 11-      CARE ([facility])   [facility]       Eddie Delaney                                                            Last  Test          Frequency    Reason                     Performed    Due Date  --------------------------------------------------------------------------------    HBA1C.......  6 months...  dulaglutide, glimepiride,   05-   11-                             metFORMIN................    [employer] Embedded Care Due Messages. Reference number: [id number].   8/15/2023 11:13:18 AM CDT  
No

## 2023-08-18 RX ORDER — TAMSULOSIN HYDROCHLORIDE 0.4 MG/1
CAPSULE ORAL
Qty: 90 CAPSULE | Refills: 2 | Status: SHIPPED | OUTPATIENT
Start: 2023-08-18

## 2023-08-18 NOTE — TELEPHONE ENCOUNTER
Refill Decision Note   Ellis Hatch  is requesting a refill authorization.  Brief Assessment and Rationale for Refill:  Approve     Medication Therapy Plan:         Comments:     Note composed:12:04 PM 08/18/2023

## 2023-08-18 NOTE — TELEPHONE ENCOUNTER
No care due was identified.  [facility] Embedded Care Due Messages. Reference number: [id number].   8/18/2023 8:05:06 AM CDT

## 2023-09-18 RX ORDER — MELOXICAM 15 MG/1
TABLET ORAL
Qty: 30 TABLET | Refills: 2 | Status: SHIPPED | OUTPATIENT
Start: 2023-09-18 | End: 2023-12-18

## 2023-09-18 NOTE — TELEPHONE ENCOUNTER
----- Message from Leslie Villa sent at 9/18/2023  1:45 PM CDT -----  Contact: [phone]  Requesting an RX refill or new RX.  Is this a refill or new RX:   RX name and strength (copy/paste from chart):  meloxicam (MOBIC) 15 MG tablet  Is this a 30 day or 90 day RX:   Pharmacy name and phone # (copy/paste from chart):      [facility] - [place] [id number] [address]  Phone: [phone] Fax: [phone]

## 2023-10-13 ENCOUNTER — TELEPHONE (OUTPATIENT)
Dept: INTERNAL MEDICINE | Facility: CLINIC | Age: 73
End: 2023-10-13
Payer: MEDICARE

## 2023-10-13 RX ORDER — ALLOPURINOL 100 MG/1
TABLET ORAL
Qty: 90 TABLET | Refills: 0 | Status: SHIPPED | OUTPATIENT
Start: 2023-10-13 | End: 2024-01-16

## 2023-10-13 NOTE — TELEPHONE ENCOUNTER
Care Due:                  Date            Visit Type   Department     Provider  --------------------------------------------------------------------------------                                EP -                              PRIMARY      [facility] INTERNAL  Last Visit: 05-      CARE ([facility])   LUCIA Delaney                              [facility]                              PRIMARY      [facility] INTERNAL  Next Visit: 11-      CARE ([facility])   [facility]       Eddie Delaney                                                            Last  Test          Frequency    Reason                     Performed    Due Date  --------------------------------------------------------------------------------    CBC.........  12 months..  allopurinoL, meloxicam...  02- 01-    Uric Acid...  12 months..  allopurinoL..............  Not Found    Overdue    [employer] Embedded Care Due Messages. Reference number: [id number].   10/13/2023 3:54:45 PM CDT

## 2023-10-13 NOTE — TELEPHONE ENCOUNTER
----- Message from Gayle Bhardwaj sent at 10/13/2023  3:59 PM CDT -----  Contact: [phone] @ patient  Requesting an RX refill or new RX.allopurinoL (ZYLOPRIM) 100 MG tablet  Is this a refill or new RX: refill  RX name and strength (copy/paste from chart):    Is this a 30 day or 90 day RX:   Pharmacy name and phone # [facility] - [facility] [id number] [place] [address]  The doctors have asked that we provide their patients with the following 2 reminders -- prescription refills can take up to 72 hours, and a friendly reminder that in the future you can use your [facility] account to request refills:

## 2023-10-13 NOTE — TELEPHONE ENCOUNTER
Refill Routing Note   Medication(s) are not appropriate for processing by [facility] for the following reason(s):      Required labs outdated    [facility] action(s):  Defer Care Due:  Labs due            Appointments  past 12m or future 3m with PCP    Date Provider   Last Visit   5/16/2023 Eddie Delaney Jr., MD   Next Visit   11/20/2023 Eddie Delaney Jr., MD   ED visits in past 90 days: 0        Note composed:4:04 PM 10/13/2023

## 2023-10-19 ENCOUNTER — TELEPHONE (OUTPATIENT)
Dept: INTERNAL MEDICINE | Facility: CLINIC | Age: 73
End: 2023-10-19
Payer: MEDICARE

## 2023-10-19 RX ORDER — PEN NEEDLE, DIABETIC 30 GX3/16"
NEEDLE, DISPOSABLE MISCELLANEOUS
Qty: 100 EACH | Refills: 3 | Status: SHIPPED | OUTPATIENT
Start: 2023-10-19

## 2023-10-19 RX ORDER — INSULIN GLARGINE 100 [IU]/ML
INJECTION, SOLUTION SUBCUTANEOUS
Qty: 3 ML | Refills: 12 | Status: SHIPPED | OUTPATIENT
Start: 2023-10-19 | End: 2023-10-24

## 2023-10-19 NOTE — TELEPHONE ENCOUNTER
----- Message from Gayle Bhardwaj sent at 10/19/2023  4:20 PM CDT -----  Contact: [phone]@   Good afternoon patient wife would like a call back to discuss her  sugar level being high. Please give her a call back [phone]

## 2023-10-20 ENCOUNTER — TELEPHONE (OUTPATIENT)
Dept: INTERNAL MEDICINE | Facility: CLINIC | Age: 73
End: 2023-10-20
Payer: MEDICARE

## 2023-10-20 NOTE — TELEPHONE ENCOUNTER
----- Message from Sheila Graham sent at 10/20/2023  8:57 AM CDT -----  Contact: Mr. Winters Phone# [phone]  Mr. Winters a Pharmacist from  Pharmacy said that you have put in an order for Lantus for the patient but the patients insurance company will cover Levemir for the patient and not the Lantus.      [facility] - [place] [id number] [address]  Phone: [phone] Fax: [phone]

## 2023-10-24 RX ORDER — INSULIN DETEMIR 100 [IU]/ML
INJECTION, SOLUTION SUBCUTANEOUS
Qty: 1 PACKET | Refills: 0 | Status: SHIPPED | OUTPATIENT
Start: 2023-10-24 | End: 2024-02-20 | Stop reason: SDUPTHER

## 2023-10-25 ENCOUNTER — TELEPHONE (OUTPATIENT)
Dept: INTERNAL MEDICINE | Facility: CLINIC | Age: 73
End: 2023-10-25
Payer: MEDICARE

## 2023-10-25 NOTE — TELEPHONE ENCOUNTER
----- Message from Gayle Bhardwaj sent at 10/25/2023  4:16 PM CDT -----  Contact: [phone] @ patient  Good afternoon patient would like a call back to discuss med that the doc was supposed to call in but its not at the pharmacy yet. Please give patient a call back [phone]

## 2023-11-14 ENCOUNTER — LAB VISIT (OUTPATIENT)
Dept: LAB | Facility: HOSPITAL | Age: 73
End: 2023-11-14
Attending: INTERNAL MEDICINE
Payer: MEDICARE

## 2023-11-14 DIAGNOSIS — E78.5 HYPERLIPIDEMIA, UNSPECIFIED HYPERLIPIDEMIA TYPE: ICD-10-CM

## 2023-11-14 DIAGNOSIS — E11.9 TYPE 2 DIABETES MELLITUS WITHOUT COMPLICATION, WITHOUT LONG-TERM CURRENT USE OF INSULIN: ICD-10-CM

## 2023-11-14 LAB
ALBUMIN SERPL BCP-MCNC: 3.6 G/DL (ref 3.5–5.2)
ALP SERPL-CCNC: 95 U/L (ref 55–135)
ALT SERPL W/O P-5'-P-CCNC: 31 U/L (ref 10–44)
ANION GAP SERPL CALC-SCNC: 10 MMOL/L (ref 8–16)
AST SERPL-CCNC: 21 U/L (ref 10–40)
BILIRUB SERPL-MCNC: 0.5 MG/DL (ref 0.1–1)
BUN SERPL-MCNC: 23 MG/DL (ref 8–23)
CALCIUM SERPL-MCNC: 9.2 MG/DL (ref 8.7–10.5)
CHLORIDE SERPL-SCNC: 106 MMOL/L (ref 95–110)
CHOLEST SERPL-MCNC: 125 MG/DL (ref 120–199)
CHOLEST/HDLC SERPL: 3 {RATIO} (ref 2–5)
CO2 SERPL-SCNC: 25 MMOL/L (ref 23–29)
CREAT SERPL-MCNC: 1.2 MG/DL (ref 0.5–1.4)
EST. GFR  (NO RACE VARIABLE): >60 ML/MIN/1.73 M^2
ESTIMATED AVG GLUCOSE: 183 MG/DL (ref 68–131)
GLUCOSE SERPL-MCNC: 178 MG/DL (ref 70–110)
HBA1C MFR BLD: 8 % (ref 4–5.6)
HDLC SERPL-MCNC: 41 MG/DL (ref 40–75)
HDLC SERPL: 32.8 % (ref 20–50)
LDLC SERPL CALC-MCNC: 60.4 MG/DL (ref 63–159)
NONHDLC SERPL-MCNC: 84 MG/DL
POTASSIUM SERPL-SCNC: 4.5 MMOL/L (ref 3.5–5.1)
PROT SERPL-MCNC: 6.9 G/DL (ref 6–8.4)
SODIUM SERPL-SCNC: 141 MMOL/L (ref 136–145)
TRIGL SERPL-MCNC: 118 MG/DL (ref 30–150)

## 2023-11-14 PROCEDURE — 36415 COLL VENOUS BLD VENIPUNCTURE: CPT | Performed by: INTERNAL MEDICINE

## 2023-11-14 PROCEDURE — 83036 HEMOGLOBIN GLYCOSYLATED A1C: CPT | Performed by: INTERNAL MEDICINE

## 2023-11-14 PROCEDURE — 80053 COMPREHEN METABOLIC PANEL: CPT | Performed by: INTERNAL MEDICINE

## 2023-11-14 PROCEDURE — 80061 LIPID PANEL: CPT | Performed by: INTERNAL MEDICINE

## 2023-11-20 ENCOUNTER — OFFICE VISIT (OUTPATIENT)
Dept: INTERNAL MEDICINE | Facility: CLINIC | Age: 73
End: 2023-11-20
Payer: MEDICARE

## 2023-11-20 VITALS
HEIGHT: 72 IN | OXYGEN SATURATION: 97 % | BODY MASS INDEX: 28.33 KG/M2 | SYSTOLIC BLOOD PRESSURE: 134 MMHG | HEART RATE: 75 BPM | WEIGHT: 209.19 LBS | DIASTOLIC BLOOD PRESSURE: 68 MMHG

## 2023-11-20 DIAGNOSIS — E11.9 TYPE 2 DIABETES MELLITUS WITHOUT COMPLICATION, WITHOUT LONG-TERM CURRENT USE OF INSULIN: Primary | ICD-10-CM

## 2023-11-20 DIAGNOSIS — M48.061 SPINAL STENOSIS AT L4-L5 LEVEL: ICD-10-CM

## 2023-11-20 DIAGNOSIS — N18.31 STAGE 3A CHRONIC KIDNEY DISEASE: ICD-10-CM

## 2023-11-20 DIAGNOSIS — E78.5 HYPERLIPIDEMIA, UNSPECIFIED HYPERLIPIDEMIA TYPE: ICD-10-CM

## 2023-11-20 DIAGNOSIS — E11.49 TYPE II DIABETES MELLITUS WITH NEUROLOGICAL MANIFESTATIONS: ICD-10-CM

## 2023-11-20 PROCEDURE — 99213 OFFICE O/P EST LOW 20 MIN: CPT | Mod: PBBFAC | Performed by: INTERNAL MEDICINE

## 2023-11-20 PROCEDURE — 99999 PR PBB SHADOW E&M-EST. PATIENT-LVL III: ICD-10-PCS | Mod: PBBFAC,,, | Performed by: INTERNAL MEDICINE

## 2023-11-20 PROCEDURE — 99999 PR PBB SHADOW E&M-EST. PATIENT-LVL III: CPT | Mod: PBBFAC,,, | Performed by: INTERNAL MEDICINE

## 2023-11-20 PROCEDURE — 99214 OFFICE O/P EST MOD 30 MIN: CPT | Mod: S$PBB,,, | Performed by: INTERNAL MEDICINE

## 2023-11-20 PROCEDURE — 99214 PR OFFICE/OUTPT VISIT, EST, LEVL IV, 30-39 MIN: ICD-10-PCS | Mod: S$PBB,,, | Performed by: INTERNAL MEDICINE

## 2023-11-20 RX ORDER — DULAGLUTIDE 1.5 MG/.5ML
1.5 INJECTION, SOLUTION SUBCUTANEOUS
Qty: 4 PEN | Refills: 2 | Status: SHIPPED | OUTPATIENT
Start: 2023-11-20 | End: 2024-02-20

## 2023-11-20 NOTE — TELEPHONE ENCOUNTER
----- Message from Nina Mcmahan sent at 11/20/2023  4:27 PM CST -----  Contact: [phone]  Requesting an RX refill or new RX.  Is this a refill or new RX: refill  RX name and strength (copy/paste from chart):  metFORMIN (GLUCOPHAGE) 500 MG tablet   Is this a 30 day or 90 day RX: 90  Pharmacy name and phone # (copy/paste from chart):    [facility] - [place] [id number] [address]  Phone: [phone] Fax: [phone]  The doctors have asked that we provide their patients with the following 2 reminders -- prescription refills can take up to 72 hours, and a friendly reminder that in the future you can use your [facility] account to request refills: yes

## 2023-11-20 NOTE — PROGRESS NOTES
"  This is a 73 -year-old gentleman follow up for his medical problem.  Blood sugar has been running "pretty good"                . PMHx of HTN, acute kidney injury, kidney stones, and DM. PSHx of hernia repair and colonoscopy. Social hx of using skoal all day for the past 40 years.  He denies any polyuria.  NO recent steroids.  Got new machine and glucose have been doing better.               Diabetes mellitus type 2. He has had DM for 20 years.  He is on   Amaryl 4 mg in the morning, metformin, Levemir- ( which was started in October)   and trulicity- this was stopped mistakingly  a month agao when the insulin was started .    . . His hemoglobin A1c 8.0 (11/2023) .  Recent GLu 125-185 - It ween high when he gtot steroid shot and also when he eats pecan pie..  .    He has been moving around more. He is able to work, but not exercising very much. No low Glucoses at home. He is having some nocturnal burning in feet.            2. He has chronic kidney disease, status post acute kidney injury back in  December 2013. Creatinine had gone up to 2.0 to 2.3 twice I the past  (2014 & 2018) . He had renal stones and    had lithotripsy last year. Most recent creatinine is 1.2, putting him at  stage II chronic kidney disease.   He has renal stones and sees a urologist in [place] for renal stones. .           3. He has hyperlipidemia, which he has had for several years. He is on  Lipitor. His cholesterol was 125,, HDL 41 , LDL is 60, and triglycerides    are 80 - these are from 2/2022-- due to recheck.        4. He has OA of both knees, s/p right  knee replacement 2013. And left knee replacement in 2018.    Gong to try to see Ortho in [place].       5. Torn rotator cuffs- He is going to see MD in [facility] for his shoulder.  .  Did not have surgery.  He is getting dry needling.  this is fine        6. ENZO- using CPAP every night and is doing well. Feels well rested.  Sees his sleep doctor next visit.       7. htn -- bp is  " controled to day-134/68 Last visit we started him on amlodipine 5mg a day.  He has all his supplies.          He has had several skin cancers removed.  Follows up regularly with his dermatologist in [place]. Sees them next week.      He has spinal stenois at l3/l4  L4/l5 and l5/S! And several areas of foraminal stenosis.  Seeing orthopedics in [place].  Considering surgery- about to get another Epidural.                ROS : Gen - no fatigue -- he  up 3 #.   # since last visit--   Eyes - no eye pain  - he has had some blurriness. -   He has an appointment in Jan .    ENT - no hoarseness or sore throat  CV - No chest pain or SOB. NO palpitations.  Pulm - no cough or wheezing  GI - no N/V/D   no dysuria or incontinence  MS - no joint pain or muscle pain  Skin - no rash, or c/o of skin lesions  Neuro -  dizziness--- memory is doing well. -  Heme - no abnormal bleeding or bruising  Endo - no polydipsia, or temperature changes  Psych -  HE has been more stressed recently-- it is slow time of year for his buisness        PHYSICAL EXAMINATION:       /68 (BP Location: Left arm, Patient Position: Sitting, BP Method: Large (Manual))   Pulse 75   Ht 6' (1.829 m)   Wt 94.9 kg (209 lb 3.5 oz)   SpO2 97%   BMI 28.37 kg/m²           General appearance: alert, appears stated age and cooperative  Head: Normocephalic, without obvious abnormality, atraumatic-  He has a t shaped wound o his scalp-  Left front-- stood up under his tool box.    Ears: normal TM's and external ear canals both ears  Nose: Nares normal. Septum midline. Mucosa normal. No drainage or sinus tenderness.  Throat: lips, mucosa, and tongue normal; teeth and gums normal  Neck: no adenopathy, no carotid bruit, no JVD, supple, symmetrical, trachea midline and thyroid not enlarged, symmetric, no tenderness/mass/nodules  Back: symmetric, no curvature. ROM normal. No CVA tenderness.  Lungs: clear to auscultation bilaterally  Chest wall: no  tenderness  Heart: regular rate and rhythm, S1, S2 normal, no murmur, click, rub or gallop  Abdomen: soft, non-tender; bowel sounds normal; no masses,  no organomegaly  Extremities: extremities normal, atraumatic, no cyanosis or edema  Pulses: 2+ and symmetric  Skin: Skin color, texture, turgor normal. No rashes or lesions                  ASSESSMENT:  1. Diabetes mellitus type 2: restart -   trulicity 1.5 mg /week He stopped on 3 mg- will move him back up.   Continue levemir.  Amaryl 4mg bid, metformin 1000mg Bid. We discussed appropriate diet and exercise. Discussed hypoglycemia.  He walks daily.  .      .  Work on diet and discussed exercise.     labs today -- follow up in 3 months       2. Anxiety-- He is cutting  down to using 1/2 xanax every morning-  .    stay on cymbalta. .drug  reviewed.  3. He has obstructive sleep apnea for which he uses CPAP. He was using it every night- he wake up well refreshed.    4. ED-- he has tried Viagra- cialias.    5. Back pain- seeing Chiropractor for this.- off NSAID due to h/o DEE.       6. Peripheral neuropathy.  -Had to come off   elavil  == He is on this due to his oa of the knees and shoulder pains.  He had ARF in 2013 on NSAIDs.  We took him off the Mobic because of acute renal failure and his chronic kidney disease. He is on gabapentin 300 mg a day- maybe at night-- will increase to 300 mg twice daily.   7. HTN- better on  amlodipine to 10  mg a day -    8.  episodic N/V-- this has resolved since Feb 2022  9. Coccydynia-  discuss sitting cushion.    10 PVD-- control bp, chol and diabetes.    11. Afib was in 2013-- no new episodes.    12.  Emphysematous changes are suggested.  Atelectasis and/or scar suggested at the lung bases.  4 mm solid nodule suggested in the right lower lobe of the lung (series 2, image 9. this appears essentially unchanged since 06/04/2014 abdomen pelvis CT and as such is favored to be benign.-- on CT of abdomen from 2/12/21--              Emphysematous changes seen on CT-- needs to stop smoking.  Discussed-- has diabetes-- discussed chantix-- he has tried.  Had screening ct in 2/14/2023    13.  Spinal stenoisis-- discussed.       Tobacco abuse---needs to stop smoking.           gets his [place] and shingles in the fall.       Follow up in 6  months with labs

## 2023-11-20 NOTE — TELEPHONE ENCOUNTER
No care due was identified.  [facility] Embedded Care Due Messages. Reference number: [id number].   11/20/2023 4:30:54 PM CST

## 2023-11-21 RX ORDER — METFORMIN HYDROCHLORIDE 500 MG/1
TABLET ORAL
Qty: 360 TABLET | Refills: 1 | Status: SHIPPED | OUTPATIENT
Start: 2023-11-21

## 2023-11-21 NOTE — TELEPHONE ENCOUNTER
Refill Decision Note   Ellis Hatch  is requesting a refill authorization.  Brief Assessment and Rationale for Refill:  Approve     Medication Therapy Plan:         Pharmacist review requested: Yes   Extended chart review required: Yes   Comments:     Note composed:3:12 AM 11/21/2023

## 2023-11-21 NOTE — TELEPHONE ENCOUNTER
Refill Routing Note   Medication(s) are not appropriate for processing by [facility] for the following reason(s):        Drug-disease interaction    [facility] action(s):  Defer        Medication Therapy Plan: Drug-Disease: metFORMIN and Gastroenteritis    Pharmacist review requested: Yes     Appointments  past 12m or future 3m with PCP    Date Provider   Last Visit   11/20/2023 Eddie Delaney Jr., MD   Next Visit   2/20/2024 Eddie Delaney Jr., MD   ED visits in past 90 days: 0        Note composed:6:04 PM 11/20/2023

## 2023-11-29 NOTE — TELEPHONE ENCOUNTER
No care due was identified.  [facility] Embedded Care Due Messages. Reference number: [id number].   11/29/2023 2:06:41 PM CST

## 2023-12-04 RX ORDER — ALPRAZOLAM 0.5 MG/1
TABLET ORAL
Qty: 10 TABLET | Refills: 2 | Status: SHIPPED | OUTPATIENT
Start: 2023-12-04 | End: 2024-02-20 | Stop reason: SDUPTHER

## 2023-12-14 RX ORDER — GLIMEPIRIDE 4 MG/1
TABLET ORAL
Qty: 180 TABLET | Refills: 1 | Status: SHIPPED | OUTPATIENT
Start: 2023-12-14 | End: 2024-02-20 | Stop reason: SDUPTHER

## 2023-12-14 NOTE — TELEPHONE ENCOUNTER
Care Due:                  Date            Visit Type   Department     Provider  --------------------------------------------------------------------------------                                EP -                              PRIMARY      [facility] INTERNAL  Last Visit: 11-      CARE ([facility])   LUCIA Delaney                              [facility]                              PRIMARY      [facility] INTERNAL  Next Visit: 02-      CARE ([facility])   [facility]       Eddie Delaney                                                            Last  Test          Frequency    Reason                     Performed    Due Date  --------------------------------------------------------------------------------    CBC.........  12 months..  allopurinoL, meloxicam...  02- 01-    Uric Acid...  12 months..  allopurinoL..............  Not Found    Overdue    [employer] Embedded Care Due Messages. Reference number: [id number].   12/14/2023 8:01:08 AM CST

## 2023-12-15 NOTE — TELEPHONE ENCOUNTER
Provider Staff:  Action required for this patient    Requires labs      Please see care gap opportunities below in Care Due Message.    Thanks!  [facility]     Appointments      Date Provider   Last Visit   11/20/2023 Eddie Delaney Jr., MD   Next Visit   2/20/2024 Eddie Delaney Jr., MD     Refill Decision Note   Ellis Hatch  is requesting a refill authorization.  Brief Assessment and Rationale for Refill:  Approve     Medication Therapy Plan:         Comments:     Note composed:8:11 PM 12/14/2023

## 2023-12-18 RX ORDER — MELOXICAM 15 MG/1
TABLET ORAL
Qty: 30 TABLET | Refills: 2 | Status: SHIPPED | OUTPATIENT
Start: 2023-12-18 | End: 2024-03-10

## 2024-01-13 NOTE — TELEPHONE ENCOUNTER
No care due was identified.  [facility] Embedded Care Due Messages. Reference number: [id number].   1/13/2024 10:57:56 AM CST

## 2024-01-16 RX ORDER — ALLOPURINOL 100 MG/1
TABLET ORAL
Qty: 90 TABLET | Refills: 3 | Status: SHIPPED | OUTPATIENT
Start: 2024-01-16 | End: 2024-01-16

## 2024-01-16 RX ORDER — ALLOPURINOL 100 MG/1
TABLET ORAL
Qty: 90 TABLET | Refills: 2 | Status: SHIPPED | OUTPATIENT
Start: 2024-01-16 | End: 2024-02-20 | Stop reason: SDUPTHER

## 2024-01-16 NOTE — TELEPHONE ENCOUNTER
----- Message from Sheila Graham sent at 1/16/2024 12:22 PM CST -----  Contact: Pts home [phone] Mrs. Hatch  Requesting an RX refill or new RX.  Is this a refill or new RX: Refill  RX name and strength allopurinoL (ZYLOPRIM) 100 MG tablet  Is this a 30 day or 90 day RX: 90  Pharmacy name and phone #   [facility] - [facility] [address] [address] [address]  Phone: [phone] Fax: [phone]  The doctors have asked that we provide their patients with the following 2 reminders -- prescription refills can take up to 72 hours, and a friendly reminder that in the future you can use your [facility] account to request refills:

## 2024-01-16 NOTE — TELEPHONE ENCOUNTER
No care due was identified.  [facility] Embedded Care Due Messages. Reference number: [id number].   1/16/2024 12:26:35 PM CST

## 2024-02-07 DIAGNOSIS — E11.9 TYPE 2 DIABETES MELLITUS WITHOUT COMPLICATION: ICD-10-CM

## 2024-02-14 ENCOUNTER — LAB VISIT (OUTPATIENT)
Dept: LAB | Facility: HOSPITAL | Age: 74
End: 2024-02-14
Attending: INTERNAL MEDICINE
Payer: MEDICARE

## 2024-02-14 DIAGNOSIS — E11.9 TYPE 2 DIABETES MELLITUS WITHOUT COMPLICATION, WITHOUT LONG-TERM CURRENT USE OF INSULIN: ICD-10-CM

## 2024-02-14 LAB
ALBUMIN SERPL BCP-MCNC: 3.6 G/DL (ref 3.5–5.2)
ALP SERPL-CCNC: 85 U/L (ref 55–135)
ALT SERPL W/O P-5'-P-CCNC: 38 U/L (ref 10–44)
ANION GAP SERPL CALC-SCNC: 9 MMOL/L (ref 8–16)
AST SERPL-CCNC: 32 U/L (ref 10–40)
BILIRUB SERPL-MCNC: 0.6 MG/DL (ref 0.1–1)
BUN SERPL-MCNC: 20 MG/DL (ref 8–23)
CALCIUM SERPL-MCNC: 9.1 MG/DL (ref 8.7–10.5)
CHLORIDE SERPL-SCNC: 105 MMOL/L (ref 95–110)
CO2 SERPL-SCNC: 26 MMOL/L (ref 23–29)
CREAT SERPL-MCNC: 1.2 MG/DL (ref 0.5–1.4)
EST. GFR  (NO RACE VARIABLE): >60 ML/MIN/1.73 M^2
ESTIMATED AVG GLUCOSE: 169 MG/DL (ref 68–131)
GLUCOSE SERPL-MCNC: 126 MG/DL (ref 70–110)
HBA1C MFR BLD: 7.5 % (ref 4–5.6)
POTASSIUM SERPL-SCNC: 4.4 MMOL/L (ref 3.5–5.1)
PROT SERPL-MCNC: 6.7 G/DL (ref 6–8.4)
SODIUM SERPL-SCNC: 140 MMOL/L (ref 136–145)

## 2024-02-14 PROCEDURE — 83036 HEMOGLOBIN GLYCOSYLATED A1C: CPT | Performed by: INTERNAL MEDICINE

## 2024-02-14 PROCEDURE — 36415 COLL VENOUS BLD VENIPUNCTURE: CPT | Performed by: INTERNAL MEDICINE

## 2024-02-14 PROCEDURE — 80053 COMPREHEN METABOLIC PANEL: CPT | Performed by: INTERNAL MEDICINE

## 2024-02-20 ENCOUNTER — OFFICE VISIT (OUTPATIENT)
Dept: INTERNAL MEDICINE | Facility: CLINIC | Age: 74
End: 2024-02-20
Payer: MEDICARE

## 2024-02-20 VITALS
SYSTOLIC BLOOD PRESSURE: 116 MMHG | HEART RATE: 73 BPM | WEIGHT: 211 LBS | HEIGHT: 72 IN | BODY MASS INDEX: 28.58 KG/M2 | OXYGEN SATURATION: 96 % | DIASTOLIC BLOOD PRESSURE: 70 MMHG

## 2024-02-20 DIAGNOSIS — I48.0 PAROXYSMAL ATRIAL FIBRILLATION: ICD-10-CM

## 2024-02-20 DIAGNOSIS — L97.521 ULCER OF LEFT FOOT, LIMITED TO BREAKDOWN OF SKIN: ICD-10-CM

## 2024-02-20 DIAGNOSIS — G62.9 PERIPHERAL POLYNEUROPATHY: ICD-10-CM

## 2024-02-20 DIAGNOSIS — J43.9 PULMONARY EMPHYSEMA, UNSPECIFIED EMPHYSEMA TYPE: ICD-10-CM

## 2024-02-20 DIAGNOSIS — Z12.5 SCREENING PSA (PROSTATE SPECIFIC ANTIGEN): ICD-10-CM

## 2024-02-20 DIAGNOSIS — E11.49 TYPE II DIABETES MELLITUS WITH NEUROLOGICAL MANIFESTATIONS: ICD-10-CM

## 2024-02-20 DIAGNOSIS — E78.5 HYPERLIPIDEMIA, UNSPECIFIED HYPERLIPIDEMIA TYPE: ICD-10-CM

## 2024-02-20 DIAGNOSIS — I73.9 PERIPHERAL VASCULAR DISEASE, UNSPECIFIED: ICD-10-CM

## 2024-02-20 DIAGNOSIS — N18.31 STAGE 3A CHRONIC KIDNEY DISEASE: Primary | ICD-10-CM

## 2024-02-20 DIAGNOSIS — F41.9 ANXIETY: ICD-10-CM

## 2024-02-20 DIAGNOSIS — R11.0 NAUSEA: ICD-10-CM

## 2024-02-20 DIAGNOSIS — E11.9 TYPE 2 DIABETES MELLITUS WITHOUT COMPLICATION, WITHOUT LONG-TERM CURRENT USE OF INSULIN: ICD-10-CM

## 2024-02-20 DIAGNOSIS — F32.A DEPRESSION, UNSPECIFIED DEPRESSION TYPE: ICD-10-CM

## 2024-02-20 PROCEDURE — 99999 PR PBB SHADOW E&M-EST. PATIENT-LVL III: CPT | Mod: PBBFAC,,, | Performed by: INTERNAL MEDICINE

## 2024-02-20 PROCEDURE — G2211 COMPLEX E/M VISIT ADD ON: HCPCS | Mod: S$PBB,,, | Performed by: INTERNAL MEDICINE

## 2024-02-20 PROCEDURE — 99213 OFFICE O/P EST LOW 20 MIN: CPT | Mod: PBBFAC | Performed by: INTERNAL MEDICINE

## 2024-02-20 PROCEDURE — 99214 OFFICE O/P EST MOD 30 MIN: CPT | Mod: S$PBB,,, | Performed by: INTERNAL MEDICINE

## 2024-02-20 RX ORDER — GLIMEPIRIDE 4 MG/1
TABLET ORAL
Qty: 180 TABLET | Refills: 3 | Status: SHIPPED | OUTPATIENT
Start: 2024-02-20 | End: 2024-05-21 | Stop reason: SDUPTHER

## 2024-02-20 RX ORDER — ONDANSETRON 4 MG/1
4 TABLET, FILM COATED ORAL EVERY 6 HOURS PRN
Qty: 30 TABLET | Refills: 1 | Status: SHIPPED | OUTPATIENT
Start: 2024-02-20

## 2024-02-20 RX ORDER — PANTOPRAZOLE SODIUM 40 MG/1
40 TABLET, DELAYED RELEASE ORAL DAILY
Qty: 90 TABLET | Refills: 3 | Status: SHIPPED | OUTPATIENT
Start: 2024-02-20

## 2024-02-20 RX ORDER — ALPRAZOLAM 0.5 MG/1
0.5 TABLET ORAL 3 TIMES DAILY
Qty: 30 TABLET | Refills: 2 | Status: SHIPPED | OUTPATIENT
Start: 2024-02-20

## 2024-02-20 RX ORDER — DULAGLUTIDE 3 MG/.5ML
3 INJECTION, SOLUTION SUBCUTANEOUS
Qty: 12 PEN | Refills: 3 | Status: SHIPPED | OUTPATIENT
Start: 2024-02-20

## 2024-02-20 RX ORDER — AMLODIPINE BESYLATE 10 MG/1
10 TABLET ORAL DAILY
Qty: 90 TABLET | Refills: 3 | Status: SHIPPED | OUTPATIENT
Start: 2024-02-20

## 2024-02-20 RX ORDER — ATORVASTATIN CALCIUM 40 MG/1
40 TABLET, FILM COATED ORAL DAILY
Qty: 90 TABLET | Refills: 3 | Status: SHIPPED | OUTPATIENT
Start: 2024-02-20

## 2024-02-20 RX ORDER — GABAPENTIN 300 MG/1
300 CAPSULE ORAL 2 TIMES DAILY
Qty: 180 CAPSULE | Refills: 3 | Status: SHIPPED | OUTPATIENT
Start: 2024-02-20

## 2024-02-20 RX ORDER — DULOXETIN HYDROCHLORIDE 60 MG/1
60 CAPSULE, DELAYED RELEASE ORAL DAILY
Qty: 90 CAPSULE | Refills: 3 | Status: SHIPPED | OUTPATIENT
Start: 2024-02-20 | End: 2024-05-21 | Stop reason: SDUPTHER

## 2024-02-20 RX ORDER — INSULIN DETEMIR 100 [IU]/ML
INJECTION, SOLUTION SUBCUTANEOUS
Qty: 1 PACKET | Refills: 0 | Status: SHIPPED | OUTPATIENT
Start: 2024-02-20 | End: 2024-05-15

## 2024-02-20 RX ORDER — ALLOPURINOL 100 MG/1
TABLET ORAL
Qty: 90 TABLET | Refills: 2 | Status: SHIPPED | OUTPATIENT
Start: 2024-02-20 | End: 2024-05-21 | Stop reason: SDUPTHER

## 2024-02-20 NOTE — PROGRESS NOTES
"  This is a 73 -year-old gentleman follow up for his medical problem.  Blood sugar has been running "pretty good"                . PMHx of HTN, acute kidney injury, kidney stones, and DM. PSHx of hernia repair and colonoscopy. Social hx of using skoal all day for the past 40 years.  He denies any polyuria.  NO recent steroids.  Got new machine and glucose have been doing better.               Diabetes mellitus type 2. He has had DM for 20 years.  He is on   Amaryl 4 mg in the morning, metformin, Levemir- ( which was started in October)   and trulicity- this was stopped mistakingly  a month agao when the insulin was started .    . . His hemoglobin A1c 7.5 (2/2024) .  Recent GLu 125-185 - It ween high when he gtot steroid shot and also when he eats pecan pie..  .    He has been moving around more. He is able to work, but not exercising very much. No low Glucoses at home. He is having some nocturnal burning in feet.            2. He has chronic kidney disease, status post acute kidney injury back in  December 2013. Creatinine had gone up to 2.0 to 2.3 twice I the past  (2014 & 2018) . He had renal stones and    had lithotripsy last year. Most recent creatinine is 1.2, putting him at  stage II chronic kidney disease.   He has renal stones and sees a urologist in [place] for renal stones. .           3. He has hyperlipidemia, which he has had for several years. He is on  Lipitor. His cholesterol was 125,, HDL 41 , LDL is 60, and triglycerides    are 80 - these are from 11/2023--          4. He has OA of both knees, s/p right  knee replacement 2013. And left knee replacement in 2018.    Gong to try to see Ortho in [place].       5. Torn rotator cuffs- He is going to see MD in [facility] for his shoulder.  .  Did not have surgery.  He is getting dry needling.  this is fine        6. ENZO- using CPAP every night and is doing well. Feels well rested.  Sees his sleep doctor next visit.       7. htn -- bp is  controled to " day-116/70.  Last visit we started him on amlodipine 5mg a day.  He has all his supplies.          He has had several skin cancers removed.  Follows up regularly with his dermatologist in [place]. Sees them next week.       He has spinal stenois at l3/l4  L4/l5 and l5/S! And several areas of foraminal stenosis.  Seeing orthopedics in [place].  Considering surgery or marijuana - He has gotten several  Epidural.                ROS : Gen - no fatigue -- he  up 1 #.       Eyes - no eye pain  - he has had some blurriness. -   He has an appointment in Jan .    ENT - no hoarseness or sore throat  CV - No chest pain or SOB. NO palpitations.  Pulm - no cough or wheezing  GI - no N/V/D   no dysuria or incontinence  MS - no joint pain or muscle pain  Skin - no rash, or c/o of skin lesions  Neuro -  dizziness--- memory is doing well. -  Heme - no abnormal bleeding or bruising  Endo - no polydipsia, or temperature changes  Psych -  HE has been more stressed recently-- it is slow time of year for his buisness        PHYSICAL EXAMINATION:         /70 (BP Location: Right arm, Patient Position: Sitting, BP Method: Large (Manual))   Pulse 73   Ht 6' (1.829 m)   Wt 95.7 kg (210 lb 15.7 oz)   SpO2 96%   BMI 28.61 kg/m²        General appearance: alert, appears stated age and cooperative  Head: Normocephalic, without obvious abnormality, atraumatic-  He has a t shaped wound o his scalp-  Left front-- stood up under his tool box.    Ears: normal TM's and external ear canals both ears  Nose: Nares normal. Septum midline. Mucosa normal. No drainage or sinus tenderness.  Throat: lips, mucosa, and tongue normal; teeth and gums normal  Neck: no adenopathy, no carotid bruit, no JVD, supple, symmetrical, trachea midline and thyroid not enlarged, symmetric, no tenderness/mass/nodules  Back: symmetric, no curvature. ROM normal. No CVA tenderness.  Lungs: clear to auscultation bilaterally  Chest wall: no tenderness  Heart: regular  rate and rhythm, S1, S2 normal, no murmur, click, rub or gallop  Abdomen: soft, non-tender; bowel sounds normal; no masses,  no organomegaly  Extremities: extremities normal, atraumatic, no cyanosis or edema  Pulses: 2+ and symmetric  Skin: Skin color, texture, turgor normal. No rashes or lesions                  ASSESSMENT:  1. Diabetes mellitus type 2:  -   trulicity 1.5 mg /week.  He stopped on 3 mg- will move him back up.   Continue levemir.  Amaryl 4mg bid, metformin 1000mg Bid. We discussed appropriate diet and exercise. Discussed hypoglycemia.  He walks daily.  .      .  Work on diet and discussed exercise.     labs today -- follow up in 3 months       2. Anxiety-- He is cutting  down to using 1/2 xanax every morning-  .    stay on cymbalta. .drug  reviewed.  3. He has obstructive sleep apnea for which he uses CPAP. He was using it every night- he wake up well refreshed.    4. ED-- he has tried Viagra- cialias.    5. Back pain- seeing Chiropractor for this.- off NSAID due to h/o DEE.       6. Peripheral neuropathy.  -Had to come off   elavil  == He is on this due to his oa of the knees and shoulder pains.  He had ARF in 2013 on NSAIDs.  We took him off the Mobic because of acute renal failure and his chronic kidney disease. He is on gabapentin 300 mg a day- maybe at night-- will increase to 300 mg twice daily.   7. HTN- better on  amlodipine to 10  mg a day -    8.  episodic N/V-- this has resolved since Feb 2022  9. Coccydynia-  discuss sitting cushion.    10 PVD-- control bp, chol and diabetes.    11. Afib was in 2013-- no new episodes.    12.  Emphysematous changes are suggested.  Atelectasis and/or scar suggested at the lung bases.  4 mm solid nodule suggested in the right lower lobe of the lung (series 2, image 9. this appears essentially unchanged since 06/04/2014 abdomen pelvis CT and as such is favored to be benign.-- on CT of abdomen from 2/12/21--    13. Colon polysp-- due back in 2024- around  may-- will order             Emphysematous changes seen on CT-- needs to stop smoking.  Discussed-- has diabetes-- discussed chantix-- he has tried.  Had screening ct in 2/14/2023     14.  Spinal stenoisis-- discussed.       Tobacco abuse---needs to stop smoking.          Our office providers are the focal point for all needed health care services that are part of ongoing care related to a patient's single serious condition or the patient's complex conditions.      Follow up in 6  months with labs

## 2024-03-06 NOTE — TELEPHONE ENCOUNTER
No care due was identified.  [facility] Embedded Care Due Messages. Reference number: [id number].   3/06/2024 8:04:08 AM CST

## 2024-03-06 NOTE — TELEPHONE ENCOUNTER
----- Message from Mary Anne Skaggs sent at 3/6/2024  4:08 PM CST -----  Contact: spouse Shruthi  [phone]  Patient's spouse Shruthi called requesting a call back from Dr. Delaney, regarding insurance company won't cover rx for Levemr flex pen injection, please call spouse to discuss     Established care with DR Mayer 8 27 2020. Per DR Blu Valadez request  PLAN:  · Newly diagnosed pulmonary hypertension from [address] 9/2/2020 :Order placed for [facility] referral to [facility] or first available [facility] specialist at [facility]. · E-scribed furosemide 60mg BID  · BMP lab script for Friday     · Request for records to be sent to [facility] program and Pacs transfer radiology and cardiology for last 12 months from [facility]. (most testings already performed were done at [facility]). Sadie Max is established with Nephrology at [facility] post kidney transplant 2014. Neurology. and has upcoming Cardiovascular medicine visit 9 28 2020.       Neurology    [address], [address] [place] [address]. Ekaterina [facility], [address] [facility]   [phone] (Fax)     OLINDA on CPAP      09/28/2020 Office Visit CARDIOVASCULAR MEDICINE [facility]   [address]   [place] [id number] [facility]   [phone] (Fax)     For dyspnea      Paz Gandhi (Attending)  [phone] (Work)  [phone] (Fax)  [address]  [place], [facility],[address]Th Floor  Nephrology  Transplant Center    . [place] [address]   [phone]     Nephrology [facility]. [place] [address]   [phone]   Pinky Álvarez ([address], [address] [facility]   [phone] (Fax)       Current Outpatient Medications on File Prior to Visit   Medication Sig Dispense Refill    rosuvastatin (CRESTOR) 20 MG tablet Take 1 tablet by mouth daily 90 tablet 3    isosorbide mononitrate (IMDUR) 60 MG extended release tablet TAKE 1 TABLET DAILY 90 tablet 3    hydrALAZINE (APRESOLINE) 50 MG tablet Take 1 tablet by mouth every 8 hours (Patient taking differently: Take 75 mg by mouth every 8 hours ) 90 tablet 3    tacrolimus (PROGRAF) 1 MG capsule Take 2 capsules by mouth 2 times daily 60 capsule 3    metoprolol succinate (TOPROL XL) 50 MG

## 2024-03-10 RX ORDER — MELOXICAM 15 MG/1
TABLET ORAL
Qty: 30 TABLET | Refills: 2 | Status: SHIPPED | OUTPATIENT
Start: 2024-03-10 | End: 2024-05-21 | Stop reason: SDUPTHER

## 2024-03-27 ENCOUNTER — TELEPHONE (OUTPATIENT)
Dept: INTERNAL MEDICINE | Facility: CLINIC | Age: 74
End: 2024-03-27
Payer: MEDICARE

## 2024-03-27 NOTE — TELEPHONE ENCOUNTER
----- Message from Broderick Tomas sent at 3/27/2024  1:18 PM CDT -----  Contact: Pt's wife [phone]  Requesting an RX refill or new RX.  Is this a refill or new RX: Refill  RX name and strength (copy/paste from chart):  insulin detemir U-100, Levemir, (LEVEMIR FLEXPEN) 100 unit/mL (3 mL) InPn pen  Is this a 30 day or 90 day RX:   Pharmacy name and phone # (copy/paste from chart): [facility] - [place] [address]   Phone: [phone]  Fax: [phone]         The doctors have asked that we provide their patients with the following 2 reminders -- prescription refills can take up to 72 hours, and a friendly reminder that in the future you can use your [facility] account to request refills: yes    Pt states insurance will no longer cover without a PA for next refill

## 2024-03-27 NOTE — TELEPHONE ENCOUNTER
Ms. Hawkins wanted to notify you that the levemir will need a PA from now on. Pt doesn't need a refill at the moment. I explained that once refill is sent, we will get a form/notification that PA is needed. She verbalized understanding.    Nereyda DELCID

## 2024-04-26 RX ORDER — ALPRAZOLAM 0.5 MG/1
0.5 TABLET ORAL 3 TIMES DAILY
Qty: 30 TABLET | Refills: 2 | OUTPATIENT
Start: 2024-04-26

## 2024-04-26 NOTE — TELEPHONE ENCOUNTER
Care Due:                  Date            Visit Type   Department     Provider  --------------------------------------------------------------------------------                                EP -                              PRIMARY      [facility] INTERNAL  Last Visit: 02-      CARE ([facility])   LUCIA Delaney                              [facility]                              PRIMARY      [facility] INTERNAL  Next Visit: 05-      CARE ([facility])   [facility]       Eddie Delaney                                                            Last  Test          Frequency    Reason                     Performed    Due Date  --------------------------------------------------------------------------------    CBC.........  12 months..  allopurinoL, meloxicam...  Not Found    Overdue    Uric Acid...  12 months..  allopurinoL..............  Not Found    Overdue    [employer] Embedded Care Due Messages. Reference number: [id number].   4/26/2024 11:04:52 AM CDT

## 2024-05-14 ENCOUNTER — TELEPHONE (OUTPATIENT)
Dept: PHARMACY | Facility: CLINIC | Age: 74
End: 2024-05-14
Payer: MEDICARE

## 2024-05-14 ENCOUNTER — TELEPHONE (OUTPATIENT)
Dept: INTERNAL MEDICINE | Facility: CLINIC | Age: 74
End: 2024-05-14
Payer: MEDICARE

## 2024-05-14 ENCOUNTER — LAB VISIT (OUTPATIENT)
Dept: LAB | Facility: HOSPITAL | Age: 74
End: 2024-05-14
Attending: INTERNAL MEDICINE
Payer: MEDICARE

## 2024-05-14 DIAGNOSIS — E78.5 HYPERLIPIDEMIA, UNSPECIFIED HYPERLIPIDEMIA TYPE: ICD-10-CM

## 2024-05-14 DIAGNOSIS — E11.9 TYPE 2 DIABETES MELLITUS WITHOUT COMPLICATION, WITHOUT LONG-TERM CURRENT USE OF INSULIN: ICD-10-CM

## 2024-05-14 DIAGNOSIS — Z12.5 SCREENING PSA (PROSTATE SPECIFIC ANTIGEN): ICD-10-CM

## 2024-05-14 DIAGNOSIS — N18.31 STAGE 3A CHRONIC KIDNEY DISEASE: ICD-10-CM

## 2024-05-14 LAB
ALBUMIN SERPL BCP-MCNC: 3.8 G/DL (ref 3.5–5.2)
ALP SERPL-CCNC: 102 U/L (ref 55–135)
ALT SERPL W/O P-5'-P-CCNC: 34 U/L (ref 10–44)
ANION GAP SERPL CALC-SCNC: 9 MMOL/L (ref 8–16)
AST SERPL-CCNC: 26 U/L (ref 10–40)
BILIRUB SERPL-MCNC: 0.7 MG/DL (ref 0.1–1)
BUN SERPL-MCNC: 24 MG/DL (ref 8–23)
CALCIUM SERPL-MCNC: 9.1 MG/DL (ref 8.7–10.5)
CHLORIDE SERPL-SCNC: 105 MMOL/L (ref 95–110)
CHOLEST SERPL-MCNC: 112 MG/DL (ref 120–199)
CHOLEST/HDLC SERPL: 3.1 {RATIO} (ref 2–5)
CO2 SERPL-SCNC: 23 MMOL/L (ref 23–29)
COMPLEXED PSA SERPL-MCNC: 0.56 NG/ML (ref 0–4)
CREAT SERPL-MCNC: 1.2 MG/DL (ref 0.5–1.4)
EST. GFR  (NO RACE VARIABLE): >60 ML/MIN/1.73 M^2
ESTIMATED AVG GLUCOSE: 166 MG/DL (ref 68–131)
GLUCOSE SERPL-MCNC: 149 MG/DL (ref 70–110)
HBA1C MFR BLD: 7.4 % (ref 4–5.6)
HDLC SERPL-MCNC: 36 MG/DL (ref 40–75)
HDLC SERPL: 32.1 % (ref 20–50)
LDLC SERPL CALC-MCNC: 43.6 MG/DL (ref 63–159)
NONHDLC SERPL-MCNC: 76 MG/DL
POTASSIUM SERPL-SCNC: 4.8 MMOL/L (ref 3.5–5.1)
PROT SERPL-MCNC: 7.3 G/DL (ref 6–8.4)
SODIUM SERPL-SCNC: 137 MMOL/L (ref 136–145)
TRIGL SERPL-MCNC: 162 MG/DL (ref 30–150)

## 2024-05-14 PROCEDURE — 80053 COMPREHEN METABOLIC PANEL: CPT | Performed by: INTERNAL MEDICINE

## 2024-05-14 PROCEDURE — 84153 ASSAY OF PSA TOTAL: CPT | Performed by: INTERNAL MEDICINE

## 2024-05-14 PROCEDURE — 36415 COLL VENOUS BLD VENIPUNCTURE: CPT | Performed by: INTERNAL MEDICINE

## 2024-05-14 PROCEDURE — 83036 HEMOGLOBIN GLYCOSYLATED A1C: CPT | Performed by: INTERNAL MEDICINE

## 2024-05-14 PROCEDURE — 80061 LIPID PANEL: CPT | Performed by: INTERNAL MEDICINE

## 2024-05-14 NOTE — TELEPHONE ENCOUNTER
Called [facility] because pt's family explained they paid out of pocket for this medication. Levemir is considered Non-formulary.   PA was denied because pt only tried Lantus and not Toujeo or Tresiba which are considered formulary.       Pt's family said they are interested in submitting application for pharmacy assistance.

## 2024-05-15 RX ORDER — INSULIN DEGLUDEC 100 U/ML
INJECTION, SOLUTION SUBCUTANEOUS
Qty: 2 PEN | Refills: 2 | Status: SHIPPED | OUTPATIENT
Start: 2024-05-15 | End: 2024-05-21

## 2024-05-15 NOTE — TELEPHONE ENCOUNTER
"I recommend try the tresebia-  I sent prescription for this.      Levemir is not longer produced--- " it ain't there no more" -- so formulary exclusions and PA's won't work    "

## 2024-05-21 ENCOUNTER — OFFICE VISIT (OUTPATIENT)
Dept: INTERNAL MEDICINE | Facility: CLINIC | Age: 74
End: 2024-05-21
Payer: MEDICARE

## 2024-05-21 ENCOUNTER — TELEPHONE (OUTPATIENT)
Dept: INTERNAL MEDICINE | Facility: CLINIC | Age: 74
End: 2024-05-21
Payer: MEDICARE

## 2024-05-21 VITALS
SYSTOLIC BLOOD PRESSURE: 122 MMHG | HEIGHT: 72 IN | HEART RATE: 85 BPM | WEIGHT: 208.75 LBS | OXYGEN SATURATION: 98 % | DIASTOLIC BLOOD PRESSURE: 74 MMHG | BODY MASS INDEX: 28.27 KG/M2

## 2024-05-21 DIAGNOSIS — E78.5 HYPERLIPIDEMIA, UNSPECIFIED HYPERLIPIDEMIA TYPE: ICD-10-CM

## 2024-05-21 DIAGNOSIS — Z96.653 STATUS POST TOTAL BILATERAL KNEE REPLACEMENT: ICD-10-CM

## 2024-05-21 DIAGNOSIS — M20.42 HAMMER TOE OF LEFT FOOT: Primary | ICD-10-CM

## 2024-05-21 DIAGNOSIS — E11.9 TYPE 2 DIABETES MELLITUS WITHOUT COMPLICATION, WITHOUT LONG-TERM CURRENT USE OF INSULIN: ICD-10-CM

## 2024-05-21 DIAGNOSIS — F17.210 NICOTINE DEPENDENCE, CIGARETTES, UNCOMPLICATED: ICD-10-CM

## 2024-05-21 DIAGNOSIS — E11.49 TYPE II DIABETES MELLITUS WITH NEUROLOGICAL MANIFESTATIONS: ICD-10-CM

## 2024-05-21 DIAGNOSIS — J43.9 PULMONARY EMPHYSEMA, UNSPECIFIED EMPHYSEMA TYPE: ICD-10-CM

## 2024-05-21 DIAGNOSIS — Z72.0 TOBACCO ABUSE: ICD-10-CM

## 2024-05-21 DIAGNOSIS — K63.5 POLYP OF COLON, UNSPECIFIED PART OF COLON, UNSPECIFIED TYPE: ICD-10-CM

## 2024-05-21 LAB
ALBUMIN/CREAT UR: 362.6 UG/MG (ref 0–30)
CREAT UR-MCNC: 171 MG/DL (ref 23–375)
MICROALBUMIN UR DL<=1MG/L-MCNC: 620 UG/ML

## 2024-05-21 PROCEDURE — 99214 OFFICE O/P EST MOD 30 MIN: CPT | Mod: PBBFAC | Performed by: INTERNAL MEDICINE

## 2024-05-21 PROCEDURE — 82043 UR ALBUMIN QUANTITATIVE: CPT | Performed by: INTERNAL MEDICINE

## 2024-05-21 PROCEDURE — 99215 OFFICE O/P EST HI 40 MIN: CPT | Mod: S$PBB,,, | Performed by: INTERNAL MEDICINE

## 2024-05-21 PROCEDURE — 99999 PR PBB SHADOW E&M-EST. PATIENT-LVL IV: CPT | Mod: PBBFAC,,, | Performed by: INTERNAL MEDICINE

## 2024-05-21 RX ORDER — MELOXICAM 15 MG/1
TABLET ORAL
Qty: 30 TABLET | Refills: 2 | Status: SHIPPED | OUTPATIENT
Start: 2024-05-21

## 2024-05-21 RX ORDER — TAMSULOSIN HYDROCHLORIDE 0.4 MG/1
1 CAPSULE ORAL DAILY
Qty: 90 CAPSULE | Refills: 2 | Status: SHIPPED | OUTPATIENT
Start: 2024-05-21

## 2024-05-21 RX ORDER — DULOXETIN HYDROCHLORIDE 60 MG/1
60 CAPSULE, DELAYED RELEASE ORAL DAILY
Qty: 90 CAPSULE | Refills: 3 | Status: SHIPPED | OUTPATIENT
Start: 2024-05-21

## 2024-05-21 RX ORDER — GLIMEPIRIDE 4 MG/1
TABLET ORAL
Qty: 180 TABLET | Refills: 3 | Status: SHIPPED | OUTPATIENT
Start: 2024-05-21

## 2024-05-21 RX ORDER — ALLOPURINOL 100 MG/1
TABLET ORAL
Qty: 90 TABLET | Refills: 2 | Status: SHIPPED | OUTPATIENT
Start: 2024-05-21

## 2024-05-21 RX ORDER — INSULIN GLARGINE 100 [IU]/ML
25 INJECTION, SOLUTION SUBCUTANEOUS DAILY
Qty: 24 ML | Refills: 3 | Status: SHIPPED | OUTPATIENT
Start: 2024-05-21

## 2024-05-21 NOTE — PROGRESS NOTES
"  This is a 74 -year-old gentleman follow up for his medical problem.  Blood sugar has been running "pretty good" Late today due [place] dou[place] the [place].                 . PMHx of HTN, acute kidney injury, kidney stones, and DM. PSHx of hernia repair and colonoscopy. Social hx of using skoal all day for the past 40 years.  He denies any polyuria.  NO recent steroids.  Got new machine and glucose have been doing better.               Diabetes mellitus type 2. He has had DM for 20 years.  He is on   Amaryl 4 mg in the morning, metformin, Levemir-25 units.  ( I lantus- but they want generic and has not paid for it.  )    and trulicity-   . His hemoglobin A1c 7.4(5/2024) .  Recent GLu 125-185 -.    He has been moving around more. He is able to work, but not exercising very much. No low Glucoses at home. He is having some nocturnal burning in feet.            2. He has chronic kidney disease, status post acute kidney injury back in  December 2013. Creatinine had gone up to 2.0 to 2.3 twice I the past  (2014 & 2018) . He had renal stones and    had lithotripsy last year. Most recent creatinine is 1.2, putting him at  stage II chronic kidney disease.   He has renal stones and sees a urologist in [place] for renal stones. .           3. He has hyperlipidemia, which he has had for several years. He is on  Lipitor. His cholesterol was 112,, HDL 36 , LDL is 43.6, and triglycerides    are 162 - these are from  5/2024.        4. He has OA of both knees, s/p right  knee replacement 2013. And left knee replacement in 2018.    Gong to try to see Ortho in [place].  He is alos seeing a spine doctor for his back  has had 2 epidural.       5. Torn rotator cuffs- He is going to see MD in [facility] for his shoulder.  .  Did not have surgery.  He is getting dry needling.  this is fine   He has been doing ross desk work recnetly and that has been been aggravating him.       6. ENZO- using CPAP every night and is doing well. Feels well " rested.  Sees his sleep doctor next visit.       7. htn -- bp is  controled to day-122/74.  Last visit we started him on amlodipine 5mg a day.  He has all his supplies.          He has had several skin cancers removed.  Follows up regularly with his dermatologist in [place]. Sees them next week.                      ROS : Gen - no fatigue -- he  down 2 #       Eyes - no eye pain  - he has had some blurriness. -   He has an appointment in Jan .    ENT - no hoarseness or sore throat  CV - No chest pain or SOB. NO palpitations.  Pulm - no cough or wheezing  GI - no N/V/D   no dysuria or incontinence  MS - no joint pain or muscle pain  Skin - no rash, or c/o of skin lesions  Neuro -  dizziness--- memory is doing well. -  Heme - no abnormal bleeding or bruising  Endo - no polydipsia, or temperature changes  Psych - ok  Foot- left foot second toe- clawed.        PHYSICAL EXAMINATION:             /74 (BP Location: Right arm, Patient Position: Sitting, BP Method: Medium (Manual))   Pulse 85   Ht 6' (1.829 m)   Wt 94.7 kg (208 lb 12.4 oz)   SpO2 98%   BMI 28.32 kg/m²           General appearance: alert, appears stated age and cooperative- not moving around room very well due to back.    Head: Normocephalic, without obvious abnormality, atraumatic-  He has a t shaped wound o his scalp-  Left front-- stood up under his tool box.    Ears: normal TM's and external ear canals both ears  Nose: Nares normal. Septum midline. Mucosa normal. No drainage or sinus tenderness.  Throat: lips, mucosa, and tongue normal; teeth and gums normal  Neck: no adenopathy, no carotid bruit, no JVD, supple, symmetrical, trachea midline and thyroid not enlarged, symmetric, no tenderness/mass/nodules  Back: symmetric, no curvature. ROM normal. No CVA tenderness.  Lungs: clear to auscultation bilaterally  Chest wall: no tenderness  Heart: regular rate and rhythm, S1, S2 normal, no murmur, click, rub or gallop  Abdomen: soft, non-tender;  bowel sounds normal; no masses,  no organomegaly  Extremities: extremities normal, atraumatic, no cyanosis or edema  Pulses: 2+ and symmetric  Skin: Skin color, texture, turgor normal. No rashes or lesions                    ASSESSMENT:  1. Diabetes mellitus type 2:  -   trulicity 3 mg /week.      levemir has been discontinues- will try Basaglar  in it;s place.  .  Amaryl 4mg bid, metformin 1000mg Bid. We discussed appropriate diet and exercise. Discussed hypoglycemia.  He walks daily.  .      .  Work on diet and discussed exercise.     labs today -- follow up in 3 months       2. Anxiety-- He is cutting  down to using 1/2 xanax every morning-  .    stay on cymbalta. .drug  reviewed.  3. He has obstructive sleep apnea for which he uses CPAP. He was using it every night- he wake up well refreshed.    4. ED-- he has tried Viagra- cialias.    5. Back pain- seeing Chiropractor and either ortho or Neuroseugery  for this.- off NSAID due to h/o DEE.       6. Peripheral neuropathy.  -Had to come off   elavil  == He is on this due to his oa of the knees and shoulder pains.  He had ARF in 2013 on NSAIDs.  We took him off the Mobic because of acute renal failure and his chronic kidney disease. He is on gabapentin 300 mg a day- maybe at night-- will increase to 300 mg twice daily.   7. HTN- better on  amlodipine to 10  mg a day -    8.  episodic N/V-- this has resolved/ or been a lot better  since Feb 2022  9. Coccydynia-  discuss sitting cushion- last visit   10 PVD-- control bp, chol and diabetes.    11. Afib was in 2013-- no new episodes.    12.  Emphysematous changes are suggested.  Atelectasis and/or scar suggested at the lung bases.  4 mm solid nodule suggested in the right lower lobe of the lung (series 2, image 9. this appears essentially unchanged since 06/04/2014 abdomen pelvis CT and as such is favored to be benign.-- on CT of abdomen from 2/12/21--    13. Colon polysp-- due back in 2024- around may-- will order   today-- discussed with them             Emphysematous changes seen on CT-- needs to stop smoking.  Discussed again  .  Discussed-- has diabetes-- discussed chantix-- he has tried.  Had screening ct in 2/14/2023     14.  Spinal stenoisis-- discussed.       Tobacco abuse---needs to stop smoking.          Our office providers are the focal point for all needed health care services that are part of ongoing care related to a patient's single serious condition or the patient's complex conditions.      Follow up in 6  months with labs      Let me know if has trouble getting insulin.  Discussed rollator.

## 2024-05-21 NOTE — TELEPHONE ENCOUNTER
----- Message from Susan Kevin sent at 5/21/2024  7:18 AM CDT -----  Contact: [phone]  FYI: Patient is stuck on [facility] bridge and will be late for 8 am appointment , please call and advise , there appears to be a bad accident on the bridge, coming from [place] and left early this morning.

## 2024-05-27 ENCOUNTER — TELEPHONE (OUTPATIENT)
Dept: INTERNAL MEDICINE | Facility: CLINIC | Age: 74
End: 2024-05-27
Payer: MEDICARE

## 2024-05-27 NOTE — TELEPHONE ENCOUNTER
Informed pt wife that he will be getting a call on 7/17 to be scheduled for colonoscopy. She verbalized understanding.    Nereyda DELCID

## 2024-05-27 NOTE — TELEPHONE ENCOUNTER
----- Message from [facility] Santos sent at 5/27/2024  3:11 PM CDT -----  Contact: Home   [phone]      Mrs. Hatch  Patients wife Mrs. Hatch would like to speak with you about her  wanting to have a colonoscopy.

## 2024-05-28 ENCOUNTER — HOSPITAL ENCOUNTER (OUTPATIENT)
Dept: RADIOLOGY | Facility: HOSPITAL | Age: 74
Discharge: HOME | End: 2024-05-28
Attending: INTERNAL MEDICINE
Payer: MEDICARE

## 2024-05-28 DIAGNOSIS — F17.210 NICOTINE DEPENDENCE, CIGARETTES, UNCOMPLICATED: ICD-10-CM

## 2024-05-28 DIAGNOSIS — Z72.0 TOBACCO ABUSE: ICD-10-CM

## 2024-05-28 PROCEDURE — 71271 CT THORAX LUNG CANCER SCR C-: CPT | Mod: TC

## 2024-05-28 PROCEDURE — 71271 CT THORAX LUNG CANCER SCR C-: CPT | Mod: 26,,, | Performed by: RADIOLOGY

## 2024-06-13 ENCOUNTER — TELEPHONE (OUTPATIENT)
Dept: INTERNAL MEDICINE | Facility: CLINIC | Age: 74
End: 2024-06-13
Payer: MEDICARE

## 2024-06-17 ENCOUNTER — TELEPHONE (OUTPATIENT)
Dept: INTERNAL MEDICINE | Facility: CLINIC | Age: 74
End: 2024-06-17
Payer: MEDICARE

## 2024-06-17 DIAGNOSIS — E11.49 TYPE II DIABETES MELLITUS WITH NEUROLOGICAL MANIFESTATIONS: Primary | ICD-10-CM

## 2024-06-17 NOTE — TELEPHONE ENCOUNTER
----- Message from Wanda Louie sent at 6/17/2024  4:01 PM CDT -----  Contact: [phone] Shruthi(wife)  type: Lab    Caller is requesting to schedule their Lab appointment prior to an appointment.    Order is not listed in EPIC.  Please enter order and contact patient to schedule.    Preferred Date and Time of Labs: 06/19 or 06/21 8 am    Date of Appointment:06/25/24    Where would they like the lab performed?[place] lab location    Would the patient rather a call back or a response via [facility]? Call back     Best Call Back Number:Shruthi(wife)[phone]    Additional Information: Pt wife is asking if the pt can have his pre op labs done before his 06/25/24 appt.

## 2024-06-21 ENCOUNTER — LAB VISIT (OUTPATIENT)
Dept: LAB | Facility: HOSPITAL | Age: 74
End: 2024-06-21
Attending: INTERNAL MEDICINE
Payer: MEDICARE

## 2024-06-21 DIAGNOSIS — E11.49 TYPE II DIABETES MELLITUS WITH NEUROLOGICAL MANIFESTATIONS: ICD-10-CM

## 2024-06-21 LAB
ALBUMIN SERPL BCP-MCNC: 3.8 G/DL (ref 3.5–5.2)
ALP SERPL-CCNC: 87 U/L (ref 55–135)
ALT SERPL W/O P-5'-P-CCNC: 38 U/L (ref 10–44)
ANION GAP SERPL CALC-SCNC: 11 MMOL/L (ref 8–16)
AST SERPL-CCNC: 32 U/L (ref 10–40)
BASOPHILS # BLD AUTO: 0.06 K/UL (ref 0–0.2)
BASOPHILS NFR BLD: 0.7 % (ref 0–1.9)
BILIRUB SERPL-MCNC: 0.8 MG/DL (ref 0.1–1)
BUN SERPL-MCNC: 20 MG/DL (ref 8–23)
CALCIUM SERPL-MCNC: 9.3 MG/DL (ref 8.7–10.5)
CHLORIDE SERPL-SCNC: 106 MMOL/L (ref 95–110)
CO2 SERPL-SCNC: 22 MMOL/L (ref 23–29)
CREAT SERPL-MCNC: 1.1 MG/DL (ref 0.5–1.4)
DIFFERENTIAL METHOD BLD: ABNORMAL
EOSINOPHIL # BLD AUTO: 0.2 K/UL (ref 0–0.5)
EOSINOPHIL NFR BLD: 2 % (ref 0–8)
ERYTHROCYTE [DISTWIDTH] IN BLOOD BY AUTOMATED COUNT: 14 % (ref 11.5–14.5)
EST. GFR  (NO RACE VARIABLE): >60 ML/MIN/1.73 M^2
ESTIMATED AVG GLUCOSE: 163 MG/DL (ref 68–131)
GLUCOSE SERPL-MCNC: 124 MG/DL (ref 70–110)
HBA1C MFR BLD: 7.3 % (ref 4–5.6)
HCT VFR BLD AUTO: 40.6 % (ref 40–54)
HGB BLD-MCNC: 13.5 G/DL (ref 14–18)
IMM GRANULOCYTES # BLD AUTO: 0.02 K/UL (ref 0–0.04)
IMM GRANULOCYTES NFR BLD AUTO: 0.2 % (ref 0–0.5)
LYMPHOCYTES # BLD AUTO: 2.5 K/UL (ref 1–4.8)
LYMPHOCYTES NFR BLD: 28.5 % (ref 18–48)
MCH RBC QN AUTO: 29.8 PG (ref 27–31)
MCHC RBC AUTO-ENTMCNC: 33.3 G/DL (ref 32–36)
MCV RBC AUTO: 90 FL (ref 82–98)
MONOCYTES # BLD AUTO: 0.4 K/UL (ref 0.3–1)
MONOCYTES NFR BLD: 4.8 % (ref 4–15)
NEUTROPHILS # BLD AUTO: 5.7 K/UL (ref 1.8–7.7)
NEUTROPHILS NFR BLD: 63.8 % (ref 38–73)
NRBC BLD-RTO: 0 /100 WBC
PLATELET # BLD AUTO: 179 K/UL (ref 150–450)
PMV BLD AUTO: 10.4 FL (ref 9.2–12.9)
POTASSIUM SERPL-SCNC: 4.5 MMOL/L (ref 3.5–5.1)
PROT SERPL-MCNC: 7 G/DL (ref 6–8.4)
RBC # BLD AUTO: 4.53 M/UL (ref 4.6–6.2)
SODIUM SERPL-SCNC: 139 MMOL/L (ref 136–145)
WBC # BLD AUTO: 8.92 K/UL (ref 3.9–12.7)

## 2024-06-21 PROCEDURE — 83036 HEMOGLOBIN GLYCOSYLATED A1C: CPT | Performed by: INTERNAL MEDICINE

## 2024-06-21 PROCEDURE — 85025 COMPLETE CBC W/AUTO DIFF WBC: CPT | Performed by: INTERNAL MEDICINE

## 2024-06-21 PROCEDURE — 80053 COMPREHEN METABOLIC PANEL: CPT | Performed by: INTERNAL MEDICINE

## 2024-06-21 PROCEDURE — 36415 COLL VENOUS BLD VENIPUNCTURE: CPT | Performed by: INTERNAL MEDICINE

## 2024-06-25 ENCOUNTER — OFFICE VISIT (OUTPATIENT)
Dept: INTERNAL MEDICINE | Facility: CLINIC | Age: 74
End: 2024-06-25
Payer: MEDICARE

## 2024-06-25 VITALS
BODY MASS INDEX: 27.95 KG/M2 | OXYGEN SATURATION: 97 % | HEART RATE: 79 BPM | HEIGHT: 72 IN | DIASTOLIC BLOOD PRESSURE: 78 MMHG | WEIGHT: 206.38 LBS | SYSTOLIC BLOOD PRESSURE: 118 MMHG

## 2024-06-25 DIAGNOSIS — E78.5 HYPERLIPIDEMIA, UNSPECIFIED HYPERLIPIDEMIA TYPE: ICD-10-CM

## 2024-06-25 DIAGNOSIS — J43.9 PULMONARY EMPHYSEMA, UNSPECIFIED EMPHYSEMA TYPE: ICD-10-CM

## 2024-06-25 DIAGNOSIS — N18.31 STAGE 3A CHRONIC KIDNEY DISEASE: Primary | ICD-10-CM

## 2024-06-25 DIAGNOSIS — E11.9 TYPE 2 DIABETES MELLITUS WITHOUT COMPLICATION, WITHOUT LONG-TERM CURRENT USE OF INSULIN: ICD-10-CM

## 2024-06-25 DIAGNOSIS — Z72.0 TOBACCO ABUSE: ICD-10-CM

## 2024-06-25 PROCEDURE — 99999 PR PBB SHADOW E&M-EST. PATIENT-LVL III: CPT | Mod: PBBFAC,,, | Performed by: INTERNAL MEDICINE

## 2024-06-25 PROCEDURE — 99214 OFFICE O/P EST MOD 30 MIN: CPT | Mod: S$PBB,,, | Performed by: INTERNAL MEDICINE

## 2024-06-25 PROCEDURE — 99213 OFFICE O/P EST LOW 20 MIN: CPT | Mod: PBBFAC | Performed by: INTERNAL MEDICINE

## 2024-06-25 NOTE — PROGRESS NOTES
"  This is a 74 -year-old gentleman follow up for his medical problem.  Blood sugar has been running "pretty good" .  He  is here for pre-op for Decompression with Coflex L3/L4 and L4/L5.   Surgery going to be July 10th                . PMHx of HTN, acute kidney injury, kidney stones, and DM. PSHx of hernia repair and colonoscopy. Social hx of using skoal all day for the past 40 years.  He denies any polyuria.  NO recent steroids.  Got new machine and glucose have been doing better.               Diabetes mellitus type 2. He has had DM for 20 years.  He is on   Amaryl 4 mg in the morning, metformin, Lantus -25 units.     and trulicity- having trouble getting this.  -   . His hemoglobin A1c 7.3 (6/2024) .  Recent GLu 124  -.    He has been moving around more. He is able to work, but not exercising very much. No low Glucoses at home. He is having some nocturnal burning in feet.            2. He has chronic kidney disease, status post acute kidney injury back in  December 2013. Creatinine had gone up to 2.0 to 2.3 twice I the past  (2014 & 2018) . He had renal stones and    had lithotripsy last year. Most recent creatinine is 1.1 , putting him at  stage II chronic kidney disease.   He has renal stones and sees a urologist in [place] for renal stones. .           3. He has hyperlipidemia, which he has had for several years. He is on  Lipitor. His cholesterol was 112,, HDL 36 , LDL is 43.6, and triglycerides    are 162 - these are from  5/2024.        4. He has OA of both knees, s/p right  knee replacement 2013. And left knee replacement in 2018.    Gong to try to see Ortho in [place].  He is alos seeing a spine doctor for his back  has had 2 epidural.       5. Torn rotator cuffs- He is going to see MD in [facility] for his shoulder.  .  Did not have surgery.  He is getting dry needling.  this is fine   He has been doing ross desk work recnetly and that has been been aggravating him.       6. ENZO- using CPAP every night " and is doing well. Feels well rested.  Sees his sleep doctor next visit.       7. htn -- bp is  controled to day-118/78.  Last visit we started him on amlodipine 5mg a day.  He has all his supplies.          He has had several skin cancers removed.  Follows up regularly with his dermatologist in [place]. Sees them last week or 2 ago.  -                      ROS : Gen - no fatigue -- he  down 2 #       Eyes - no eye pain  - he has had some blurriness. -     ENT - no hoarseness or sore throat  CV - No chest pain or SOB. NO palpitations.  Can walk up 2 flights of stairs at work without stopping to rest or having chest pain .    Pulm - no cough or wheezing  GI - no N/V/D   no dysuria or incontinence  MS - no joint pain or muscle pain  Skin - no rash, or c/o of skin lesions  Neuro -  dizziness--- memory is doing well. -  Heme - no abnormal bleeding or bruising  Endo - no polydipsia, or temperature changes  Psych - ok  Foot- left foot second toe- clawed.        PHYSICAL EXAMINATION:         /78 (BP Location: Right arm, Patient Position: Sitting, BP Method: Large (Manual))   Pulse 79   Ht 6' (1.829 m)   Wt 93.6 kg (206 lb 5.6 oz)   SpO2 97%   BMI 27.99 kg/m²                 General appearance: alert, appears stated age and cooperative- not moving around room very well due to back.    Head: Normocephalic, without obvious abnormality, atraumatic-  He has a t shaped wound o his scalp-  Left front-- stood up under his tool box.    Ears: normal TM's and external ear canals both ears  Nose: Nares normal. Septum midline. Mucosa normal. No drainage or sinus tenderness.  Throat: lips, mucosa, and tongue normal; teeth and gums normal  Neck: no adenopathy, no carotid bruit, no JVD, supple, symmetrical, trachea midline and thyroid not enlarged, symmetric, no tenderness/mass/nodules  Back: symmetric, no curvature. ROM normal. No CVA tenderness.  Lungs: clear to auscultation bilaterally  Chest wall: no tenderness  Heart:  regular rate and rhythm, S1, S2 normal, no murmur, click, rub or gallop  Abdomen: soft, non-tender; bowel sounds normal; no masses,  no organomegaly  Extremities: extremities normal, atraumatic, no cyanosis or edema  Pulses: 2+ and symmetric  Skin: Skin color, texture, turgor normal. No rashes or lesions                     ASSESSMENT:  1. Diabetes mellitus type 2:  -   trulicity 3 mg /week.      levemir has been discontinued  will take Basaglar  in it;s place.  .  Amaryl 4mg bid, metformin 1000mg Bid. We discussed appropriate diet and exercise. Discussed hypoglycemia.  He walks daily.  .      .  Work on diet and discussed exercise.     labs today -- follow up in 3 months       2. Anxiety-- He is cutting  down to using 1/2 xanax every morning-  .    stay on cymbalta. .drug  reviewed.  3. He has obstructive sleep apnea for which he uses CPAP. He was using it every night- he wake up well refreshed.    4. ED-- he has tried Viagra- cialias.    5. Back pain- seeing Chiropractor and either ortho or Neuroseugery  for this.- off NSAID due to h/o DEE.       6. Peripheral neuropathy.  -Had to come off   elavil  == He is on this due to his oa of the knees and shoulder pains.  He had ARF in 2013 on NSAIDs.  We took him off the Mobic because of acute renal failure and his chronic kidney disease. He is on gabapentin 300 mg a day- maybe at night-- will increase to 300 mg twice daily.   7. HTN- better on  amlodipine to 10  mg a day -    8.  episodic N/V-- this has resolved/ or been a lot better  since Feb 2022  9. Coccydynia-  discuss sitting cushion- last visit - may need lumbar surgery    10 PVD-- control bp, chol and diabetes.    11. Afib was in 2013-- no new episodes.    12.  Emphysematous changes are suggested.  Atelectasis and/or scar suggested at the lung bases.  4 mm solid nodule suggested in the right lower lobe of the lung (series 2, image 9. this appears essentially unchanged since 06/04/2014 abdomen pelvis CT and as  such is favored to be benign.-- on CT of abdomen from 2/12/21--    13. Colon polysp-- due back in 2024- around may-- will order  today-- discussed with them             Emphysematous changes seen on CT-- needs to stop smoking.  Discussed again  .  Discussed-- has diabetes-- discussed chantix-- he has tried.  Had screening ct in 2/14/2023     14.  Spinal stenoisis-- discussed.       15. Tobacco abuse---needs to stop smoking.  NO smoking 1 week prior  and 1 week post op-- actually never restart.          Our office providers are the focal point for all needed health care services that are part of ongoing care related to a patient's single serious condition or the patient's complex conditions.      Follow up in 6  months with labs       Let me know if has trouble getting insulin-- THEY HAVE THE 3 mg trulicity here - so will get it filled here today.        He is medically cleared for anesthesia and surgery  - He said he has Recent EKG and his cardiologist has cleared him  recently

## 2024-06-26 ENCOUNTER — TELEPHONE (OUTPATIENT)
Dept: INTERNAL MEDICINE | Facility: CLINIC | Age: 74
End: 2024-06-26
Payer: MEDICARE

## 2024-06-26 NOTE — TELEPHONE ENCOUNTER
----- Message from Elieser Riley sent at 6/26/2024 10:23 AM CDT -----  Regarding: Labwork Needed for Surgery  Contact: [phone]  [email or web address]     Patient is calling for Medical Advice regarding: Labwork needed for patient's surgery clearance. Clearance was received but they still need the labwork results faxed over. Direct number provided in contact info and fax number provided in comment section.    How long has patient had these symptoms:    Pharmacy name and phone#:    Patient wants a call back or thru [facility]:    Comments:    Fax: [phone]

## 2024-07-17 ENCOUNTER — CLINICAL SUPPORT (OUTPATIENT)
Dept: ENDOSCOPY | Facility: HOSPITAL | Age: 74
End: 2024-07-17
Attending: INTERNAL MEDICINE
Payer: MEDICARE

## 2024-07-17 ENCOUNTER — TELEPHONE (OUTPATIENT)
Dept: ENDOSCOPY | Facility: HOSPITAL | Age: 74
End: 2024-07-17

## 2024-07-17 DIAGNOSIS — Z12.11 SCREEN FOR COLON CANCER: Primary | ICD-10-CM

## 2024-07-17 DIAGNOSIS — K63.5 POLYP OF COLON, UNSPECIFIED PART OF COLON, UNSPECIFIED TYPE: ICD-10-CM

## 2024-07-17 NOTE — TELEPHONE ENCOUNTER
PAT appt to schedule colonoscopy with no answer x2.  Left voicemail message with direct contact number for pt to return call.  New refendo entered.

## 2024-07-17 NOTE — PLAN OF CARE
PAT appt to schedule colonoscopy with no answer x2.  Left voicemail message with direct contact number for pt to return call.

## 2024-07-18 ENCOUNTER — TELEPHONE (OUTPATIENT)
Dept: ENDOSCOPY | Facility: HOSPITAL | Age: 74
End: 2024-07-18
Payer: MEDICARE

## 2024-07-18 NOTE — TELEPHONE ENCOUNTER
Received voicemail from pt's wife Shruthi regarding scheduling a colonoscopy.  Telephoned Shruthi with no answer.  Left voicemail message with direct contact number for her or pt to return call.

## 2024-07-23 NOTE — TELEPHONE ENCOUNTER
----- Message from Suzanne Hidalgo sent at 7/23/2024  2:32 PM CDT -----  Contact: [phone]  Requesting an RX refill or new RX.    Is this a refill or new RX:  refill     RX name and strength ALPRAZolam (XANAX) 0.5 MG tablet    Is this a 30 day or 90 day RX: 30    Pharmacy name and phone # (copy/paste from chart):      [facility] - [place] [id number] [address]  Phone: [phone] Fax: [phone]

## 2024-08-01 RX ORDER — ALPRAZOLAM 0.5 MG/1
0.5 TABLET ORAL 3 TIMES DAILY
Qty: 30 TABLET | Refills: 2 | OUTPATIENT
Start: 2024-08-01

## 2024-08-02 ENCOUNTER — TELEPHONE (OUTPATIENT)
Dept: INTERNAL MEDICINE | Facility: CLINIC | Age: 74
End: 2024-08-02
Payer: MEDICARE

## 2024-08-02 NOTE — TELEPHONE ENCOUNTER
----- Message from Nereyda Car MA sent at 8/2/2024  2:32 PM CDT -----  Contact: [phone] Shruthi(wife)    ----- Message -----  From: Wanda Louie  Sent: 8/2/2024  10:54 AM CDT  To: Elaina POTTS Jr Staff    Pt wife states she has called [facility] and the pharmacy has been faxing the Rx request to Dr Delaney since 07/23/24. Please call and advise    --- Message from Suzanne Hidalgo sent at 7/23/2024  2:32 PM CDT -----  Contact: [phone]  Requesting an RX refill or new RX.     Is this a refill or new RX:  refill      RX name and strength ALPRAZolam (XANAX) 0.5 MG tablet     Is this a 30 day or 90 day RX: 30     Pharmacy name and phone # (copy/paste from chart):       [facility] - [place] [id number] [address]  Phone: [phone] Fax: [phone]

## 2024-08-02 NOTE — TELEPHONE ENCOUNTER
----- Message from Nereyda Car MA sent at 8/2/2024  2:32 PM CDT -----  Contact: [phone] Shruthi(wife)    ----- Message -----  From: Wanda Louie  Sent: 8/2/2024  10:54 AM CDT  To: Elaina POTTS Jr Staff    Pt wife states she has called [facility] and the pharmacy has been faxing the Rx request to Dr Delaney since 07/23/24. Please call and advise    --- Message from Suzanne Hidalgo sent at 7/23/2024  2:32 PM CDT -----  Contact: [phone]  Requesting an RX refill or new RX.     Is this a refill or new RX:  refill      RX name and strength ALPRAZolam (XANAX) 0.5 MG tablet     Is this a 30 day or 90 day RX: 30     Pharmacy name and phone # (copy/paste from chart):       [facility] - [place] [id number] [address]  Phone: [phone] Fax: [phone]

## 2024-08-15 ENCOUNTER — TELEPHONE (OUTPATIENT)
Dept: INTERNAL MEDICINE | Facility: CLINIC | Age: 74
End: 2024-08-15
Payer: MEDICARE

## 2024-08-15 NOTE — TELEPHONE ENCOUNTER
----- Message from Katerin Rachel sent at 8/15/2024  3:55 PM CDT -----  Contact: [phone] Patient  Pt has been off the percocet for 8 days today 08/15/2024        Requesting an RX refill or new RX.    Is this a refill or new RX: new    RX name and strength (copy/paste from chart):  ALPRAZolam (XANAX) 0.5 MG tablet    Is this a 30 day or 90 day RX:     Pharmacy name and phone # (copy/paste from chart):    [facility] - [place] [id number] [address]  Phone: [phone] Fax: [phone]

## 2024-08-16 NOTE — ADDENDUM NOTE
Addended by: DUTCH CANO on: 7/5/2019 11:38 AM     Modules accepted: Donavon     no abd pain, no fever, no vomiting Unable to determine.

## 2024-08-16 NOTE — TELEPHONE ENCOUNTER
No care due was identified.  [facility] Embedded Care Due Messages. Reference number: [id number].   8/16/2024 1:45:41 PM CDT

## 2024-08-21 RX ORDER — ALPRAZOLAM 0.5 MG/1
0.5 TABLET ORAL DAILY PRN
Qty: 15 TABLET | Refills: 2 | Status: SHIPPED | OUTPATIENT
Start: 2024-08-21

## 2024-09-23 ENCOUNTER — TELEPHONE (OUTPATIENT)
Dept: INTERNAL MEDICINE | Facility: CLINIC | Age: 74
End: 2024-09-23
Payer: MEDICARE

## 2024-09-23 NOTE — TELEPHONE ENCOUNTER
----- Message from Gayle Bhardwaj sent at 9/23/2024  1:56 PM CDT -----  Contact: [phone]@patient  Requesting an RX refill or new RX.ALPRAZolam (XANAX) 0.5 MG tablet    Is this a refill or new RX: refill     RX name and strength (copy/paste from chart):      Is this a 30 day or 90 day RX:     Pharmacy name and phone #   [facility] - [place] - [address] [place] [address]        The doctors have asked that we provide their patients with the following 2 reminders -- prescription refills can take up to 72 hours, and a friendly reminder that in the future you can use your [facility] account to request refills:

## 2024-10-03 PROCEDURE — 99285 EMERGENCY DEPT VISIT HI MDM: CPT

## 2024-10-03 PROCEDURE — 96365 THER/PROPH/DIAG IV INF INIT: CPT

## 2024-10-03 PROCEDURE — 94761 N-INVAS EAR/PLS OXIMETRY MLT: CPT

## 2024-10-04 ENCOUNTER — ANESTHESIA (OUTPATIENT)
Dept: SURGERY | Facility: HOSPITAL | Age: 74
End: 2024-10-04
Payer: MEDICARE

## 2024-10-04 ENCOUNTER — HOSPITAL ENCOUNTER (OUTPATIENT)
Facility: HOSPITAL | Age: 74
Discharge: HOME | End: 2024-10-05
Attending: EMERGENCY MEDICINE | Admitting: STUDENT IN AN ORGANIZED HEALTH CARE EDUCATION/TRAINING PROGRAM
Payer: MEDICARE

## 2024-10-04 ENCOUNTER — ANESTHESIA EVENT (OUTPATIENT)
Dept: SURGERY | Facility: HOSPITAL | Age: 74
End: 2024-10-04
Payer: MEDICARE

## 2024-10-04 DIAGNOSIS — K81.0 ACUTE CHOLECYSTITIS: Primary | ICD-10-CM

## 2024-10-04 DIAGNOSIS — K80.50 CHOLEDOCHOLITHIASIS: ICD-10-CM

## 2024-10-04 DIAGNOSIS — R10.9 ABDOMINAL PAIN: ICD-10-CM

## 2024-10-04 DIAGNOSIS — K80.00 CALCULUS OF GALLBLADDER WITH ACUTE CHOLECYSTITIS WITHOUT OBSTRUCTION: ICD-10-CM

## 2024-10-04 LAB
ACINETOBACTER CALCOACETICUS/BAUMANNII COMPLEX: NOT DETECTED
ALBUMIN SERPL BCP-MCNC: 3.9 G/DL (ref 3.5–5.2)
ALP SERPL-CCNC: 113 U/L (ref 55–135)
ALT SERPL W/O P-5'-P-CCNC: 23 U/L (ref 10–44)
ANION GAP SERPL CALC-SCNC: 11 MMOL/L (ref 8–16)
AST SERPL-CCNC: 20 U/L (ref 10–40)
BACTERIA #/AREA URNS AUTO: ABNORMAL /HPF
BACTEROIDES FRAGILIS: NOT DETECTED
BASOPHILS # BLD AUTO: 0.06 K/UL (ref 0–0.2)
BASOPHILS NFR BLD: 0.4 % (ref 0–1.9)
BILIRUB SERPL-MCNC: 0.8 MG/DL (ref 0.1–1)
BILIRUB UR QL STRIP: NEGATIVE
BUN SERPL-MCNC: 30 MG/DL (ref 6–30)
BUN SERPL-MCNC: 32 MG/DL (ref 8–23)
CALCIUM SERPL-MCNC: 9.9 MG/DL (ref 8.7–10.5)
CANDIDA ALBICANS: NOT DETECTED
CANDIDA AURIS: NOT DETECTED
CANDIDA GLABRATA: NOT DETECTED
CANDIDA KRUSEI: NOT DETECTED
CANDIDA PARAPSILOSIS: NOT DETECTED
CANDIDA TROPICALIS: NOT DETECTED
CHLORIDE SERPL-SCNC: 103 MMOL/L (ref 95–110)
CHLORIDE SERPL-SCNC: 105 MMOL/L (ref 95–110)
CLARITY UR REFRACT.AUTO: CLEAR
CO2 SERPL-SCNC: 23 MMOL/L (ref 23–29)
COLOR UR AUTO: YELLOW
CREAT SERPL-MCNC: 1.3 MG/DL (ref 0.5–1.4)
CREAT SERPL-MCNC: 1.3 MG/DL (ref 0.5–1.4)
CRYPTOCOCCUS NEOFORMANS/GATTII: NOT DETECTED
CTX-M GENE (ESBL PRODUCER): ABNORMAL
DIFFERENTIAL METHOD BLD: ABNORMAL
ENTEROBACTER CLOACAE COMPLEX: NOT DETECTED
ENTEROBACTERALES: NOT DETECTED
ENTEROCOCCUS FAECALIS: NOT DETECTED
ENTEROCOCCUS FAECIUM: NOT DETECTED
EOSINOPHIL # BLD AUTO: 0.2 K/UL (ref 0–0.5)
EOSINOPHIL NFR BLD: 1 % (ref 0–8)
ERYTHROCYTE [DISTWIDTH] IN BLOOD BY AUTOMATED COUNT: 15.7 % (ref 11.5–14.5)
ESCHERICHIA COLI: NOT DETECTED
EST. GFR  (NO RACE VARIABLE): 58.4 ML/MIN/1.73 M^2
ESTIMATED AVG GLUCOSE: 151 MG/DL (ref 68–131)
GLUCOSE SERPL-MCNC: 215 MG/DL (ref 70–110)
GLUCOSE SERPL-MCNC: 220 MG/DL (ref 70–110)
GLUCOSE UR QL STRIP: ABNORMAL
HAEMOPHILUS INFLUENZAE: NOT DETECTED
HBA1C MFR BLD: 6.9 % (ref 4–5.6)
HCT VFR BLD AUTO: 42.9 % (ref 40–54)
HCT VFR BLD CALC: 43 %PCV (ref 36–54)
HGB BLD-MCNC: 13.5 G/DL (ref 14–18)
HGB UR QL STRIP: NEGATIVE
HYALINE CASTS UR QL AUTO: 1 /LPF
IMM GRANULOCYTES # BLD AUTO: 0.06 K/UL (ref 0–0.04)
IMM GRANULOCYTES NFR BLD AUTO: 0.4 % (ref 0–0.5)
IMP GENE (CARBAPENEM RESISTANT): ABNORMAL
KETONES UR QL STRIP: ABNORMAL
KLEBSIELLA AEROGENES: NOT DETECTED
KLEBSIELLA OXYTOCA: NOT DETECTED
KLEBSIELLA PNEUMONIAE GROUP: NOT DETECTED
KPC RESISTANCE GENE (CARBAPENEM): ABNORMAL
LACTATE SERPL-SCNC: 1.6 MMOL/L (ref 0.5–2.2)
LACTATE SERPL-SCNC: 2.6 MMOL/L (ref 0.5–2.2)
LEUKOCYTE ESTERASE UR QL STRIP: NEGATIVE
LIPASE SERPL-CCNC: 27 U/L (ref 4–60)
LISTERIA MONOCYTOGENES: NOT DETECTED
LYMPHOCYTES # BLD AUTO: 1.8 K/UL (ref 1–4.8)
LYMPHOCYTES NFR BLD: 12.4 % (ref 18–48)
MCH RBC QN AUTO: 28.4 PG (ref 27–31)
MCHC RBC AUTO-ENTMCNC: 31.5 G/DL (ref 32–36)
MCR-1: ABNORMAL
MCV RBC AUTO: 90 FL (ref 82–98)
MEC A/C AND MREJ (MRSA): ABNORMAL
MEC A/C: ABNORMAL
MICROSCOPIC COMMENT: ABNORMAL
MONOCYTES # BLD AUTO: 0.9 K/UL (ref 0.3–1)
MONOCYTES NFR BLD: 5.8 % (ref 4–15)
NDM GENE (CARBAPENEM RESISTANT): ABNORMAL
NEISSERIA MENINGITIDIS: NOT DETECTED
NEUTROPHILS # BLD AUTO: 11.9 K/UL (ref 1.8–7.7)
NEUTROPHILS NFR BLD: 80 % (ref 38–73)
NITRITE UR QL STRIP: NEGATIVE
NRBC BLD-RTO: 0 /100 WBC
OHS QRS DURATION: 92 MS
OHS QTC CALCULATION: 455 MS
OXA-48-LIKE (CARBAPENEM RESISTANT): ABNORMAL
PH UR STRIP: 6 [PH] (ref 5–8)
PLATELET # BLD AUTO: 196 K/UL (ref 150–450)
PMV BLD AUTO: 10.4 FL (ref 9.2–12.9)
POC IONIZED CALCIUM: 1.17 MMOL/L (ref 1.06–1.42)
POC TCO2 (MEASURED): 23 MMOL/L (ref 23–29)
POCT GLUCOSE: 159 MG/DL (ref 70–110)
POCT GLUCOSE: 209 MG/DL (ref 70–110)
POCT GLUCOSE: 95 MG/DL (ref 70–110)
POTASSIUM BLD-SCNC: 4.7 MMOL/L (ref 3.5–5.1)
POTASSIUM SERPL-SCNC: 4.7 MMOL/L (ref 3.5–5.1)
PROT SERPL-MCNC: 7.5 G/DL (ref 6–8.4)
PROT UR QL STRIP: ABNORMAL
PROTEUS SPECIES: NOT DETECTED
PSEUDOMONAS AERUGINOSA: NOT DETECTED
RBC # BLD AUTO: 4.75 M/UL (ref 4.6–6.2)
RBC #/AREA URNS AUTO: 1 /HPF (ref 0–4)
SALMONELLA SP: NOT DETECTED
SAMPLE: ABNORMAL
SERRATIA MARCESCENS: NOT DETECTED
SODIUM BLD-SCNC: 138 MMOL/L (ref 136–145)
SODIUM SERPL-SCNC: 139 MMOL/L (ref 136–145)
SP GR UR STRIP: 1.02 (ref 1–1.03)
STAPHYLOCOCCUS AUREUS: NOT DETECTED
STAPHYLOCOCCUS EPIDERMIDIS: NOT DETECTED
STAPHYLOCOCCUS LUGDUNESIS: NOT DETECTED
STAPHYLOCOCCUS SPECIES: NOT DETECTED
STENOTROPHOMONAS MALTOPHILIA: NOT DETECTED
STREPTOCOCCUS AGALACTIAE: NOT DETECTED
STREPTOCOCCUS PNEUMONIAE: NOT DETECTED
STREPTOCOCCUS PYOGENES: NOT DETECTED
STREPTOCOCCUS SPECIES: DETECTED
TROPONIN I SERPL DL<=0.01 NG/ML-MCNC: <0.006 NG/ML (ref 0–0.03)
URN SPEC COLLECT METH UR: ABNORMAL
VAN A/B (VRE GENE): ABNORMAL
VIM GENE (CARBAPENEM RESISTANT): ABNORMAL
WBC # BLD AUTO: 14.82 K/UL (ref 3.9–12.7)
WBC #/AREA URNS AUTO: 7 /HPF (ref 0–5)

## 2024-10-04 PROCEDURE — D9220A PRA ANESTHESIA: Mod: ,,, | Performed by: ANESTHESIOLOGY

## 2024-10-04 PROCEDURE — 63600175 PHARM REV CODE 636 W HCPCS: Performed by: STUDENT IN AN ORGANIZED HEALTH CARE EDUCATION/TRAINING PROGRAM

## 2024-10-04 PROCEDURE — 71000015 HC POSTOP RECOV 1ST HR: Performed by: STUDENT IN AN ORGANIZED HEALTH CARE EDUCATION/TRAINING PROGRAM

## 2024-10-04 PROCEDURE — 87154 CUL TYP ID BLD PTHGN 6+ TRGT: CPT

## 2024-10-04 PROCEDURE — 63600175 PHARM REV CODE 636 W HCPCS

## 2024-10-04 PROCEDURE — 93005 ELECTROCARDIOGRAM TRACING: CPT | Mod: 59

## 2024-10-04 PROCEDURE — 85025 COMPLETE CBC W/AUTO DIFF WBC: CPT

## 2024-10-04 PROCEDURE — 25000003 PHARM REV CODE 250

## 2024-10-04 PROCEDURE — 96366 THER/PROPH/DIAG IV INF ADDON: CPT

## 2024-10-04 PROCEDURE — 82962 GLUCOSE BLOOD TEST: CPT | Performed by: STUDENT IN AN ORGANIZED HEALTH CARE EDUCATION/TRAINING PROGRAM

## 2024-10-04 PROCEDURE — 27201423 OPTIME MED/SURG SUP & DEVICES STERILE SUPPLY: Performed by: STUDENT IN AN ORGANIZED HEALTH CARE EDUCATION/TRAINING PROGRAM

## 2024-10-04 PROCEDURE — 82962 GLUCOSE BLOOD TEST: CPT

## 2024-10-04 PROCEDURE — 93010 ELECTROCARDIOGRAM REPORT: CPT | Mod: ,,, | Performed by: INTERNAL MEDICINE

## 2024-10-04 PROCEDURE — 88304 TISSUE EXAM BY PATHOLOGIST: CPT | Performed by: PATHOLOGY

## 2024-10-04 PROCEDURE — 83605 ASSAY OF LACTIC ACID: CPT | Mod: 91 | Performed by: EMERGENCY MEDICINE

## 2024-10-04 PROCEDURE — 88304 TISSUE EXAM BY PATHOLOGIST: CPT | Mod: 26,,, | Performed by: PATHOLOGY

## 2024-10-04 PROCEDURE — 83036 HEMOGLOBIN GLYCOSYLATED A1C: CPT

## 2024-10-04 PROCEDURE — 63600175 PHARM REV CODE 636 W HCPCS: Performed by: EMERGENCY MEDICINE

## 2024-10-04 PROCEDURE — 25500020 PHARM REV CODE 255: Performed by: EMERGENCY MEDICINE

## 2024-10-04 PROCEDURE — 71000033 HC RECOVERY, INTIAL HOUR: Performed by: STUDENT IN AN ORGANIZED HEALTH CARE EDUCATION/TRAINING PROGRAM

## 2024-10-04 PROCEDURE — 71000016 HC POSTOP RECOV ADDL HR: Performed by: STUDENT IN AN ORGANIZED HEALTH CARE EDUCATION/TRAINING PROGRAM

## 2024-10-04 PROCEDURE — 63600175 PHARM REV CODE 636 W HCPCS: Mod: JZ,JG | Performed by: STUDENT IN AN ORGANIZED HEALTH CARE EDUCATION/TRAINING PROGRAM

## 2024-10-04 PROCEDURE — 96367 TX/PROPH/DG ADDL SEQ IV INF: CPT | Mod: 59

## 2024-10-04 PROCEDURE — 47562 LAPAROSCOPIC CHOLECYSTECTOMY: CPT | Mod: GC,,, | Performed by: STUDENT IN AN ORGANIZED HEALTH CARE EDUCATION/TRAINING PROGRAM

## 2024-10-04 PROCEDURE — 81001 URINALYSIS AUTO W/SCOPE: CPT

## 2024-10-04 PROCEDURE — 96376 TX/PRO/DX INJ SAME DRUG ADON: CPT | Mod: 59

## 2024-10-04 PROCEDURE — 96375 TX/PRO/DX INJ NEW DRUG ADDON: CPT

## 2024-10-04 PROCEDURE — 80053 COMPREHEN METABOLIC PANEL: CPT

## 2024-10-04 PROCEDURE — 36000709 HC OR TIME LEV III EA ADD 15 MIN: Performed by: STUDENT IN AN ORGANIZED HEALTH CARE EDUCATION/TRAINING PROGRAM

## 2024-10-04 PROCEDURE — 37000008 HC ANESTHESIA 1ST 15 MINUTES: Performed by: STUDENT IN AN ORGANIZED HEALTH CARE EDUCATION/TRAINING PROGRAM

## 2024-10-04 PROCEDURE — 63600175 PHARM REV CODE 636 W HCPCS: Performed by: ANESTHESIOLOGY

## 2024-10-04 PROCEDURE — 83690 ASSAY OF LIPASE: CPT

## 2024-10-04 PROCEDURE — 96361 HYDRATE IV INFUSION ADD-ON: CPT | Mod: 59

## 2024-10-04 PROCEDURE — 83605 ASSAY OF LACTIC ACID: CPT

## 2024-10-04 PROCEDURE — 25000242 PHARM REV CODE 250 ALT 637 W/ HCPCS

## 2024-10-04 PROCEDURE — 37000009 HC ANESTHESIA EA ADD 15 MINS: Performed by: STUDENT IN AN ORGANIZED HEALTH CARE EDUCATION/TRAINING PROGRAM

## 2024-10-04 PROCEDURE — 87040 BLOOD CULTURE FOR BACTERIA: CPT | Mod: 59

## 2024-10-04 PROCEDURE — 80047 BASIC METABLC PNL IONIZED CA: CPT

## 2024-10-04 PROCEDURE — 84484 ASSAY OF TROPONIN QUANT: CPT

## 2024-10-04 PROCEDURE — G0378 HOSPITAL OBSERVATION PER HR: HCPCS

## 2024-10-04 PROCEDURE — 96361 HYDRATE IV INFUSION ADD-ON: CPT

## 2024-10-04 PROCEDURE — 87186 SC STD MICRODIL/AGAR DIL: CPT

## 2024-10-04 PROCEDURE — 36000708 HC OR TIME LEV III 1ST 15 MIN: Performed by: STUDENT IN AN ORGANIZED HEALTH CARE EDUCATION/TRAINING PROGRAM

## 2024-10-04 RX ORDER — SODIUM CHLORIDE, SODIUM LACTATE, POTASSIUM CHLORIDE, CALCIUM CHLORIDE 600; 310; 30; 20 MG/100ML; MG/100ML; MG/100ML; MG/100ML
INJECTION, SOLUTION INTRAVENOUS CONTINUOUS
Status: DISCONTINUED | OUTPATIENT
Start: 2024-10-04 | End: 2024-10-05

## 2024-10-04 RX ORDER — DEXAMETHASONE SODIUM PHOSPHATE 4 MG/ML
INJECTION, SOLUTION INTRA-ARTICULAR; INTRALESIONAL; INTRAMUSCULAR; INTRAVENOUS; SOFT TISSUE
Status: DISCONTINUED | OUTPATIENT
Start: 2024-10-04 | End: 2024-10-04

## 2024-10-04 RX ORDER — GLUCAGON 1 MG
1 KIT INJECTION
Status: DISCONTINUED | OUTPATIENT
Start: 2024-10-04 | End: 2024-10-04

## 2024-10-04 RX ORDER — PROPOFOL 10 MG/ML
VIAL (ML) INTRAVENOUS
Status: DISCONTINUED | OUTPATIENT
Start: 2024-10-04 | End: 2024-10-04

## 2024-10-04 RX ORDER — OXYCODONE HYDROCHLORIDE 5 MG/1
5 TABLET ORAL EVERY 4 HOURS PRN
Status: DISCONTINUED | OUTPATIENT
Start: 2024-10-04 | End: 2024-10-05 | Stop reason: HOSPADM

## 2024-10-04 RX ORDER — HYDROMORPHONE HYDROCHLORIDE 1 MG/ML
0.5 INJECTION, SOLUTION INTRAMUSCULAR; INTRAVENOUS; SUBCUTANEOUS EVERY 6 HOURS PRN
Status: DISCONTINUED | OUTPATIENT
Start: 2024-10-04 | End: 2024-10-05

## 2024-10-04 RX ORDER — MORPHINE SULFATE 2 MG/ML
2 INJECTION, SOLUTION INTRAMUSCULAR; INTRAVENOUS
Status: COMPLETED | OUTPATIENT
Start: 2024-10-04 | End: 2024-10-04

## 2024-10-04 RX ORDER — ACETAMINOPHEN 325 MG/1
650 TABLET ORAL EVERY 8 HOURS PRN
Status: DISCONTINUED | OUTPATIENT
Start: 2024-10-04 | End: 2024-10-04

## 2024-10-04 RX ORDER — LIDOCAINE HYDROCHLORIDE 10 MG/ML
1 INJECTION, SOLUTION EPIDURAL; INFILTRATION; INTRACAUDAL; PERINEURAL ONCE AS NEEDED
Status: DISCONTINUED | OUTPATIENT
Start: 2024-10-04 | End: 2024-10-05 | Stop reason: HOSPADM

## 2024-10-04 RX ORDER — ATORVASTATIN CALCIUM 40 MG/1
40 TABLET, FILM COATED ORAL DAILY
Status: DISCONTINUED | OUTPATIENT
Start: 2024-10-04 | End: 2024-10-05 | Stop reason: HOSPADM

## 2024-10-04 RX ORDER — FENTANYL CITRATE 50 UG/ML
INJECTION, SOLUTION INTRAMUSCULAR; INTRAVENOUS
Status: DISCONTINUED | OUTPATIENT
Start: 2024-10-04 | End: 2024-10-04

## 2024-10-04 RX ORDER — ACETAMINOPHEN 500 MG
1000 TABLET ORAL EVERY 8 HOURS
Status: DISCONTINUED | OUTPATIENT
Start: 2024-10-04 | End: 2024-10-05 | Stop reason: HOSPADM

## 2024-10-04 RX ORDER — ACETAMINOPHEN 325 MG/1
650 TABLET ORAL EVERY 4 HOURS PRN
Status: DISCONTINUED | OUTPATIENT
Start: 2024-10-04 | End: 2024-10-04

## 2024-10-04 RX ORDER — ACETAMINOPHEN 10 MG/ML
INJECTION, SOLUTION INTRAVENOUS
Status: DISCONTINUED | OUTPATIENT
Start: 2024-10-04 | End: 2024-10-04

## 2024-10-04 RX ORDER — HALOPERIDOL 5 MG/ML
0.5 INJECTION INTRAMUSCULAR EVERY 10 MIN PRN
Status: DISCONTINUED | OUTPATIENT
Start: 2024-10-04 | End: 2024-10-04

## 2024-10-04 RX ORDER — ENOXAPARIN SODIUM 100 MG/ML
40 INJECTION SUBCUTANEOUS EVERY 24 HOURS
Status: DISCONTINUED | OUTPATIENT
Start: 2024-10-05 | End: 2024-10-05 | Stop reason: HOSPADM

## 2024-10-04 RX ORDER — BUPIVACAINE HYDROCHLORIDE 2.5 MG/ML
INJECTION, SOLUTION EPIDURAL; INFILTRATION; INTRACAUDAL
Status: DISCONTINUED | OUTPATIENT
Start: 2024-10-04 | End: 2024-10-04 | Stop reason: HOSPADM

## 2024-10-04 RX ORDER — PHENYLEPHRINE HCL IN 0.9% NACL 1 MG/10 ML
SYRINGE (ML) INTRAVENOUS
Status: DISCONTINUED | OUTPATIENT
Start: 2024-10-04 | End: 2024-10-04

## 2024-10-04 RX ORDER — ROCURONIUM BROMIDE 10 MG/ML
INJECTION, SOLUTION INTRAVENOUS
Status: DISCONTINUED | OUTPATIENT
Start: 2024-10-04 | End: 2024-10-04

## 2024-10-04 RX ORDER — ONDANSETRON HYDROCHLORIDE 2 MG/ML
INJECTION, SOLUTION INTRAVENOUS
Status: DISCONTINUED | OUTPATIENT
Start: 2024-10-04 | End: 2024-10-04

## 2024-10-04 RX ORDER — CEFAZOLIN SODIUM 1 G/3ML
INJECTION, POWDER, FOR SOLUTION INTRAMUSCULAR; INTRAVENOUS
Status: DISCONTINUED | OUTPATIENT
Start: 2024-10-04 | End: 2024-10-04

## 2024-10-04 RX ORDER — HYDROMORPHONE HYDROCHLORIDE 1 MG/ML
0.2 INJECTION, SOLUTION INTRAMUSCULAR; INTRAVENOUS; SUBCUTANEOUS EVERY 5 MIN PRN
Status: DISCONTINUED | OUTPATIENT
Start: 2024-10-04 | End: 2024-10-04

## 2024-10-04 RX ORDER — ONDANSETRON 8 MG/1
8 TABLET, ORALLY DISINTEGRATING ORAL EVERY 8 HOURS PRN
Status: DISCONTINUED | OUTPATIENT
Start: 2024-10-04 | End: 2024-10-05 | Stop reason: HOSPADM

## 2024-10-04 RX ORDER — SODIUM CHLORIDE 0.9 % (FLUSH) 0.9 %
10 SYRINGE (ML) INJECTION
Status: DISCONTINUED | OUTPATIENT
Start: 2024-10-04 | End: 2024-10-05 | Stop reason: HOSPADM

## 2024-10-04 RX ORDER — SODIUM CHLORIDE 0.9 % (FLUSH) 0.9 %
3 SYRINGE (ML) INJECTION
Status: DISCONTINUED | OUTPATIENT
Start: 2024-10-04 | End: 2024-10-04

## 2024-10-04 RX ORDER — ONDANSETRON HYDROCHLORIDE 2 MG/ML
4 INJECTION, SOLUTION INTRAVENOUS
Status: COMPLETED | OUTPATIENT
Start: 2024-10-04 | End: 2024-10-04

## 2024-10-04 RX ORDER — LIDOCAINE HYDROCHLORIDE 20 MG/ML
INJECTION, SOLUTION EPIDURAL; INFILTRATION; INTRACAUDAL; PERINEURAL
Status: DISCONTINUED | OUTPATIENT
Start: 2024-10-04 | End: 2024-10-04

## 2024-10-04 RX ORDER — DULOXETIN HYDROCHLORIDE 60 MG/1
60 CAPSULE, DELAYED RELEASE ORAL DAILY
Status: DISCONTINUED | OUTPATIENT
Start: 2024-10-05 | End: 2024-10-05 | Stop reason: HOSPADM

## 2024-10-04 RX ORDER — DEXMEDETOMIDINE HYDROCHLORIDE 100 UG/ML
INJECTION, SOLUTION INTRAVENOUS
Status: DISCONTINUED | OUTPATIENT
Start: 2024-10-04 | End: 2024-10-04

## 2024-10-04 RX ORDER — OXYCODONE HYDROCHLORIDE 10 MG/1
10 TABLET ORAL EVERY 4 HOURS PRN
Status: DISCONTINUED | OUTPATIENT
Start: 2024-10-04 | End: 2024-10-05 | Stop reason: HOSPADM

## 2024-10-04 RX ORDER — HALOPERIDOL 5 MG/ML
0.5 INJECTION INTRAMUSCULAR EVERY 10 MIN PRN
Status: DISCONTINUED | OUTPATIENT
Start: 2024-10-04 | End: 2024-10-05 | Stop reason: HOSPADM

## 2024-10-04 RX ORDER — SUCCINYLCHOLINE CHLORIDE 20 MG/ML
INJECTION INTRAMUSCULAR; INTRAVENOUS
Status: DISCONTINUED | OUTPATIENT
Start: 2024-10-04 | End: 2024-10-04

## 2024-10-04 RX ORDER — ALBUTEROL SULFATE 90 UG/1
INHALANT RESPIRATORY (INHALATION)
Status: DISCONTINUED | OUTPATIENT
Start: 2024-10-04 | End: 2024-10-04

## 2024-10-04 RX ORDER — TALC
6 POWDER (GRAM) TOPICAL NIGHTLY PRN
Status: DISCONTINUED | OUTPATIENT
Start: 2024-10-04 | End: 2024-10-05 | Stop reason: HOSPADM

## 2024-10-04 RX ORDER — MORPHINE SULFATE 4 MG/ML
4 INJECTION, SOLUTION INTRAMUSCULAR; INTRAVENOUS
Status: COMPLETED | OUTPATIENT
Start: 2024-10-04 | End: 2024-10-04

## 2024-10-04 RX ORDER — HYDROMORPHONE HYDROCHLORIDE 1 MG/ML
0.2 INJECTION, SOLUTION INTRAMUSCULAR; INTRAVENOUS; SUBCUTANEOUS EVERY 5 MIN PRN
Status: DISCONTINUED | OUTPATIENT
Start: 2024-10-04 | End: 2024-10-04 | Stop reason: HOSPADM

## 2024-10-04 RX ADMIN — CEFAZOLIN 2 G: 330 INJECTION, POWDER, FOR SOLUTION INTRAMUSCULAR; INTRAVENOUS at 10:10

## 2024-10-04 RX ADMIN — DEXTROSE MONOHYDRATE 2 G: 5 INJECTION INTRAVENOUS at 01:10

## 2024-10-04 RX ADMIN — MORPHINE SULFATE 4 MG: 4 INJECTION INTRAVENOUS at 03:10

## 2024-10-04 RX ADMIN — ROCURONIUM BROMIDE 50 MG: 10 INJECTION, SOLUTION INTRAVENOUS at 11:10

## 2024-10-04 RX ADMIN — ALBUTEROL SULFATE 5 PUFF: 108 INHALANT RESPIRATORY (INHALATION) at 11:10

## 2024-10-04 RX ADMIN — PIPERACILLIN SODIUM AND TAZOBACTAM SODIUM 4.5 G: 4; .5 INJECTION, POWDER, FOR SOLUTION INTRAVENOUS at 03:10

## 2024-10-04 RX ADMIN — PROPOFOL 10 MG: 10 INJECTION, EMULSION INTRAVENOUS at 11:10

## 2024-10-04 RX ADMIN — VANCOMYCIN HYDROCHLORIDE 2250 MG: 1 INJECTION, POWDER, LYOPHILIZED, FOR SOLUTION INTRAVENOUS at 08:10

## 2024-10-04 RX ADMIN — SODIUM CHLORIDE 1000 ML: 9 INJECTION, SOLUTION INTRAVENOUS at 03:10

## 2024-10-04 RX ADMIN — PROPOFOL 150 MG: 10 INJECTION, EMULSION INTRAVENOUS at 10:10

## 2024-10-04 RX ADMIN — ROCURONIUM BROMIDE 5 MG: 10 INJECTION, SOLUTION INTRAVENOUS at 10:10

## 2024-10-04 RX ADMIN — HYDROMORPHONE HYDROCHLORIDE 0.2 MG: 1 INJECTION, SOLUTION INTRAMUSCULAR; INTRAVENOUS; SUBCUTANEOUS at 01:10

## 2024-10-04 RX ADMIN — ONDANSETRON 4 MG: 2 INJECTION INTRAMUSCULAR; INTRAVENOUS at 01:10

## 2024-10-04 RX ADMIN — SODIUM CHLORIDE 1000 ML: 9 INJECTION, SOLUTION INTRAVENOUS at 01:10

## 2024-10-04 RX ADMIN — Medication 100 MCG: at 10:10

## 2024-10-04 RX ADMIN — SUGAMMADEX 380 MG: 100 INJECTION, SOLUTION INTRAVENOUS at 11:10

## 2024-10-04 RX ADMIN — ONDANSETRON 4 MG: 2 INJECTION INTRAMUSCULAR; INTRAVENOUS at 08:10

## 2024-10-04 RX ADMIN — ACETAMINOPHEN 1000 MG: 10 INJECTION, SOLUTION INTRAVENOUS at 10:10

## 2024-10-04 RX ADMIN — FENTANYL CITRATE 100 MCG: 50 INJECTION INTRAMUSCULAR; INTRAVENOUS at 10:10

## 2024-10-04 RX ADMIN — ACETAMINOPHEN 1000 MG: 500 TABLET ORAL at 08:10

## 2024-10-04 RX ADMIN — LIDOCAINE HYDROCHLORIDE 100 MG: 20 INJECTION, SOLUTION EPIDURAL; INFILTRATION; INTRACAUDAL at 10:10

## 2024-10-04 RX ADMIN — IOHEXOL 100 ML: 350 INJECTION, SOLUTION INTRAVENOUS at 02:10

## 2024-10-04 RX ADMIN — HALOPERIDOL LACTATE 0.5 MG: 5 INJECTION, SOLUTION INTRAMUSCULAR at 01:10

## 2024-10-04 RX ADMIN — ROCURONIUM BROMIDE 45 MG: 10 INJECTION, SOLUTION INTRAVENOUS at 10:10

## 2024-10-04 RX ADMIN — DEXMEDETOMIDINE 8 MCG: 100 INJECTION, SOLUTION, CONCENTRATE INTRAVENOUS at 12:10

## 2024-10-04 RX ADMIN — SODIUM CHLORIDE: 9 INJECTION, SOLUTION INTRAVENOUS at 10:10

## 2024-10-04 RX ADMIN — SODIUM CHLORIDE, POTASSIUM CHLORIDE, SODIUM LACTATE AND CALCIUM CHLORIDE: 600; 310; 30; 20 INJECTION, SOLUTION INTRAVENOUS at 08:10

## 2024-10-04 RX ADMIN — ONDANSETRON 4 MG: 2 INJECTION INTRAMUSCULAR; INTRAVENOUS at 11:10

## 2024-10-04 RX ADMIN — MORPHINE SULFATE 2 MG: 2 INJECTION, SOLUTION INTRAMUSCULAR; INTRAVENOUS at 01:10

## 2024-10-04 RX ADMIN — DEXAMETHASONE SODIUM PHOSPHATE 4 MG: 4 INJECTION INTRA-ARTICULAR; INTRALESIONAL; INTRAMUSCULAR; INTRAVENOUS; SOFT TISSUE at 10:10

## 2024-10-04 RX ADMIN — SUCCINYLCHOLINE CHLORIDE 160 MG: 20 INJECTION, SOLUTION INTRAMUSCULAR; INTRAVENOUS; PARENTERAL at 10:10

## 2024-10-04 NOTE — NURSING TRANSFER
Nursing Transfer Note      10/4/2024   4:25 PM    Transfer To: Room 1028    Transfer via bed    Transfer with cardiac monitoring    Transported by hospital transport    Telemetry: Rate 81  Order for Tele Monitor? Yes    4eyes on Skin: yes    Medicines sent: None    Any special needs or follow-up needed: None    Patient belongings transferred with patient: Yes    Chart send with patient: Yes    Notified: spouse    Patient reassessed at: 10/4/2024  16:24 (date, time)
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
## 2024-10-04 NOTE — OP NOTE
DATE OF PROCEDURE: 10/04/2024.     PREOPERATIVE DIAGNOSIS: Calculus of gallbladder with acute cholecystitis without obstruction [K80.00] .     POSTOPERATIVE DIAGNOSIS: Calculus of gallbladder with acute cholecystitis without obstruction [K80.00] .     PROCEDURE PERFORMED: Laparoscopic cholecystectomy    ATTENDING SURGEON: Miguel Armstrong MD    RESIDENT: Torey Estevez MD; Rupali Pisano MD     ANESTHESIA: General endotracheal.     INDICATIONS: Ellis Hatch is a 74 y.o.male who presents with abdominal pain, associated nausea and vomiting. His imaging was consistent with cholecystitis. We recommended laparoscopic cholecystectomy and the patient agreed to proceed. The patient signed informed consent and expressed understanding of the risks and benefits of surgery.     OPERATIVE PROCEDURE: The patient was taken to the operating room and placed supine. General anesthesia was induced without incident and the abdomen was prepped and draped in the standard fashion. Timeout was performed. A supraumbilical skin incision was made. Subcutaneous tissue was bluntly dissected. The fascia was grasped and sharply incised. 0 vicryl stay sutures were placed in the fascia for closing at the end of the case. The abdomen was bluntly entered under direct vision. A 12mm trocar was placed and the abdomen was insufflated. A 10-mm laparoscope was placed and the abdomen was examined. There was no evidence of injury related to entry. Three 5-mm trocars were placed under direct vision through separate stab incisions, one subxiphoid and two in the right upper quadrant. We directed our attention to the right upper quadrant. The gallbladder was identified and noted to have inflammatory change. The fundus was grasped and retracted cranially and the infundibulum was grasped and retracted laterally. We bluntly dissected the peritoneal reflection off the infundibulum and neck of the gallbladder. With careful blunt and cautery dissection  in this area, we were able to identify the cystic duct. Further careful dissection identified the cystic artery and we did obtain a critical view of safety. Both duct and artery were triply clipped and divided. The gallbladder was dissected off the gallbladder fossa using electrocautery from infundibulum to fundus until free. It was placed into an EndoCatch bag and removed from the umbilical port site with no difficulty. The gallbladder fossa was examined and there was bleeding noted from the raw surface of the lateral liver. Hemostasis was obtained using electrocautery. The clips on the cystic duct and artery were intact. The right upper quadrant was irrigated with saline copiously until the returning effluent was clear. All ports were removed under direct vision and no bleeding was noted. The insufflation was evacuated. The umbilical fascia was closed with 0 Vicryl suture. Local anesthetic was applied to each incision were closed with subcuticular 4-0 Monocryl. Dermabond was applied. The patient was extubated and transferred to the Recovery Room in good condition. All needle and sponge counts were correct at the conclusion of the case. I was present for the procedure in its entirety..    ESTIMATED BLOOD LOSS: 200 mL.     FINDINGS: Critical view obtained prior to clip placement.      SPECIMEN: Gallbladder.
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
## 2024-10-04 NOTE — ED NOTES
Patient identifiers for Ellis Hatch 74 y.o. male checked and correct.  Chief Complaint   Patient presents with    Abdominal Pain     Abdominal pain started earlier today, endorses nausea and vomiting on and off.      Past Medical History:   Diagnosis Date    Acute kidney injury 11/2013    Diabetes mellitus 2004    type 2    Hx of atrial fibrillation, no current medication 11/2013    cardioverted while hospitalized for pneumonia    Hypertension     Kidney stones 2011    lithotripsy & scope to remove    Macular degeneration 07/23/2019    per note in     Pneumonia 11/13    hospitalized 8 days    Sleep apnea     has cpap     Allergies reported:   Review of patient's allergies indicates:   Allergen Reactions    Aspirin      Due to acute kidney injury in 11/2013  Other reaction(s): Kidney Problems, renal failure    Nsaids (non-steroidal anti-inflammatory drug)      Due to acute kidney injury in 11/2013    Promethazine Other (See Comments)     Sedates patient  Other reaction(s): Anxiety    Codeine Hives    Morphine Hives    Doxycycline Nausea Only         LOC: Patient is awake, alert, and aware of environment with an appropriate affect. Patient is oriented x 4 and speaking appropriately.  APPEARANCE: Patient resting comfortably and in no acute distress. Patient is clean and well groomed, patient's clothing is properly fastened.   SKIN: The skin is warm and dry. Patient has normal skin turgor and moist mucus membranes.   MUSKULOSKELETAL: Patient is moving all extremities well, no obvious deformities noted. Pulses intact.   RESPIRATORY: Airway is open and patent. Respirations are spontaneous and non-labored with normal effort and rate.  CARDIAC: Patient has a normal rate and rhythm. Normal sinus on cardiac monitor. No peripheral edema noted.   ABDOMEN: No distention noted. Soft and non-tender upon palpation.  NEUROLOGICAL: PERRL. Facial expression is symmetrical. Hand grasps are equal bilaterally. Normal  sensation in all extremities when touched with finger.

## 2024-10-04 NOTE — HPI
Patient is a 74 y.o. male with history of T2DM and HTN who General Surgery was consulted for evaluation of concern for acute cholecystitis. States he developed crampy abdominal pain overnight associated with vomiting and diarrhea. Has had intermittent episodes similar to this several times over the last few months. Describes the pain as diffuse but states pain was worst in RUQ when examined by the ED before he received pain medications. Currently feeling better. Denies fever or chills. AFVSS on arrival to the ED. Labs with leukocytosis. CT and US showed distended gallbladder with cholelithiasis and borderline gallbladder wall thickening. Patient without history of abdominal surgery. Denies history of MI/stroke. Not on any blood thinners.   
Difficulty making decisions/Difficulty remembering

## 2024-10-04 NOTE — ASSESSMENT & PLAN NOTE
74 y.o. male with history of T2DM and HTN with cholelithiasis and workup concerning for early acute cholecystitis.      - Admit to General Surgery  - NPO  - IV antibiotics   - IVF  - OR for laparoscopic cholecystectomy. Will obtain consent.   - Prn pain meds and antiemetics   - Restart home meds as appropriate

## 2024-10-04 NOTE — PLAN OF CARE
Problem: Adult Inpatient Plan of Care  Goal: Plan of Care Review  Outcome: Progressing  Goal: Absence of Hospital-Acquired Illness or Injury  Outcome: Progressing  Goal: Optimal Comfort and Wellbeing  Outcome: Progressing     Problem: Wound  Goal: Improved Oral Intake  Outcome: Progressing  Goal: Optimal Pain Control and Function  Outcome: Progressing  Goal: Skin Health and Integrity  Outcome: Progressing  Goal: Optimal Wound Healing  Outcome: Progressing     Problem: Fall Injury Risk  Goal: Absence of Fall and Fall-Related Injury  Outcome: Progressing

## 2024-10-04 NOTE — PLAN OF CARE
SW attempted to complete assessment.  Pt ZACH     SW/CM to follow-up      Tara Neal, VIJAYA, MSW, LMSW, RSW   Case Management  [facility]  Email: [email or web address]

## 2024-10-04 NOTE — BRIEF OP NOTE
[facility] - Surgery ([facility])  Brief Operative Note    SUMMARY     Surgery Date: 10/4/2024     Surgeons and Role:     * Miguel Armstrong MD - Primary     * Torey Estevez MD - Resident - Assisting     * Rupali Pisano MD - Resident - Assisting        Pre-op Diagnosis:  Calculus of gallbladder with acute cholecystitis without obstruction [K80.00]    Post-op Diagnosis:  Post-Op Diagnosis Codes:     * Calculus of gallbladder with acute cholecystitis without obstruction [K80.00]    Procedure(s) (LRB):  CHOLECYSTECTOMY, LAPAROSCOPIC (N/A)    Anesthesia: General    Implants:  * No implants in log *    Operative Findings: laparoscopic cholecystectomy without difficulty. Critical view of safety obtained prior to clip placement.     Estimated Blood Loss: 200mL    Estimated Blood Loss has been documented.         Specimens:   Specimen (24h ago, onward)       Start     Ordered    10/04/24 1115  Specimen to Pathology, Surgery General Surgery  Once        Comments: Pre-op Diagnosis: Calculus of gallbladder with acute cholecystitis without obstruction [K80.00]Procedure(s):CHOLECYSTECTOMY, LAPAROSCOPIC Number of specimens: 1Name of specimens: 1.) Gallbladder     References:    Click here for ordering Quick Tip   Question Answer Comment   Procedure Type: General Surgery    Specimen Class: Routine/Screening    Release to patient Immediate        10/04/24 1115                    [id number]

## 2024-10-04 NOTE — ANESTHESIA RELEASE NOTE
Anesthesia Release from PACU Note    Patient: Ellis Hatch    Procedure(s) Performed: Procedure(s) (LRB):  CHOLECYSTECTOMY, LAPAROSCOPIC (N/A)    Anesthesia type: general    Post pain: Adequate analgesia    Post assessment: no apparent anesthetic complications and tolerated procedure well    Last Vitals: Visit Vitals  BP (!) 162/70 (BP Location: Right arm, Patient Position: Lying)   Pulse 80   Temp 36.9 °C (98.4 °F) (Temporal)   Resp 15   Wt 93 kg (205 lb)   SpO2 100%   BMI 27.80 kg/m²       Post vital signs: stable    Level of consciousness: responds to stimulation and sedated    Nausea/Vomiting: no nausea/no vomiting    Complications: none    Airway Patency: patent    Respiratory: unassisted, spontaneous ventilation, face mask    Cardiovascular: stable and blood pressure at baseline    Hydration: euvolemic

## 2024-10-04 NOTE — ANESTHESIA PROCEDURE NOTES
Intubation    Date/Time: 10/4/2024 10:36 AM    Performed by: Antoine Hanson MD  Authorized by: Brian Cuevas MD    Intubation:     Induction:  Rapid sequence induction    Intubated:  Postinduction    Mask Ventilation:  Not attempted    Attempts:  1    Attempted By:  Resident anesthesiologist    Method of Intubation:  Video laryngoscopy    Blade:  Castellanos 3    Laryngeal View Grade: Grade I - full view of cords      Difficult Airway Encountered?: No      Complications:  None    Airway Device:  Oral endotracheal tube    Airway Device Size:  7.5    Style/Cuff Inflation:  Cuffed (inflated to minimal occlusive pressure)    Tube secured:  22    Secured at:  The teeth    Placement Verified By:  Capnometry    Complicating Factors:  None    Findings Post-Intubation:  BS equal bilateral and atraumatic/condition of teeth unchanged

## 2024-10-04 NOTE — ED NOTES
I-STAT Chem-8+ Results:   Value Reference Range   Sodium  138 136-145 mmol/L   Potassium  4.7  3.5-5.1 mmol/L   Chloride  103  mmol/L   Ionized Calcium  1.17 1.06-1.42 mmol/L   CO2 (measured)  23 23-29 mmol/L   Glucose  215  mg/dL   BUN  30 6-30 mg/dL   Creatinine  1.3 0.5-1.4 mg/dL   Hematocrit  43 36-54%

## 2024-10-04 NOTE — TELEPHONE ENCOUNTER
Refill Routing Note   Medication(s) are not appropriate for processing by [facility] for the following reason(s):        Required labs outdated    [facility] action(s):  Defer               Appointments  past 12m or future 3m with PCP    Date Provider   Last Visit   11/20/2023 Eddie Delaney Jr., MD   Next Visit   2/20/2024 Eddie Delaney Jr., MD   ED visits in past 90 days: 0        Note composed:11:11 AM 01/14/2024            This patient has been assessed with a concern for Malnutrition and has been determined to have a diagnosis/diagnoses of Severe protein-calorie malnutrition.    This patient is being managed with:   Diet DASH/TLC-  Sodium & Cholesterol Restricted  Consistent Carbohydrate {No Snacks} (CSTCHO)  Supplement Feeding Modality:  Oral  Ensure Max Cans or Servings Per Day:  1       Frequency:  Three Times a day  Entered: Oct  2 2024 12:02PM   This patient has been assessed with a concern for Malnutrition and has been determined to have a diagnosis/diagnoses of Severe protein-calorie malnutrition.    This patient is being managed with:   Diet DASH/TLC-  Sodium & Cholesterol Restricted  Consistent Carbohydrate {No Snacks} (CSTCHO)  Supplement Feeding Modality:  Oral  Ensure Max Cans or Servings Per Day:  1       Frequency:  Three Times a day  Entered: Oct  2 2024 12:02PM   This patient has been assessed with a concern for Malnutrition and has been determined to have a diagnosis/diagnoses of Severe protein-calorie malnutrition.    This patient is being managed with:   Diet DASH/TLC-  Sodium & Cholesterol Restricted  Consistent Carbohydrate {No Snacks} (CSTCHO)  Supplement Feeding Modality:  Oral  Ensure Max Cans or Servings Per Day:  1       Frequency:  Three Times a day  Entered: Oct  2 2024 12:02PM   This patient has been assessed with a concern for Malnutrition and has been determined to have a diagnosis/diagnoses of Severe protein-calorie malnutrition.    This patient is being managed with:   Diet DASH/TLC-  Sodium & Cholesterol Restricted  Consistent Carbohydrate {No Snacks} (CSTCHO)  Supplement Feeding Modality:  Oral  Ensure Max Cans or Servings Per Day:  1       Frequency:  Three Times a day  Entered: Oct  2 2024 12:02PM

## 2024-10-05 VITALS
BODY MASS INDEX: 28.39 KG/M2 | TEMPERATURE: 98 F | SYSTOLIC BLOOD PRESSURE: 138 MMHG | RESPIRATION RATE: 20 BRPM | HEART RATE: 74 BPM | WEIGHT: 209.63 LBS | DIASTOLIC BLOOD PRESSURE: 65 MMHG | HEIGHT: 72 IN | OXYGEN SATURATION: 96 %

## 2024-10-05 LAB
ANION GAP SERPL CALC-SCNC: 8 MMOL/L (ref 8–16)
BASOPHILS # BLD AUTO: 0.02 K/UL (ref 0–0.2)
BASOPHILS NFR BLD: 0.2 % (ref 0–1.9)
BUN SERPL-MCNC: 18 MG/DL (ref 8–23)
CALCIUM SERPL-MCNC: 8.6 MG/DL (ref 8.7–10.5)
CHLORIDE SERPL-SCNC: 108 MMOL/L (ref 95–110)
CO2 SERPL-SCNC: 23 MMOL/L (ref 23–29)
CREAT SERPL-MCNC: 1 MG/DL (ref 0.5–1.4)
DIFFERENTIAL METHOD BLD: ABNORMAL
EOSINOPHIL # BLD AUTO: 0 K/UL (ref 0–0.5)
EOSINOPHIL NFR BLD: 0 % (ref 0–8)
ERYTHROCYTE [DISTWIDTH] IN BLOOD BY AUTOMATED COUNT: 15.9 % (ref 11.5–14.5)
ERYTHROCYTE [DISTWIDTH] IN BLOOD BY AUTOMATED COUNT: 16.1 % (ref 11.5–14.5)
EST. GFR  (NO RACE VARIABLE): >60 ML/MIN/1.73 M^2
GLUCOSE SERPL-MCNC: 129 MG/DL (ref 70–110)
HCT VFR BLD AUTO: 33.7 % (ref 40–54)
HCT VFR BLD AUTO: 33.9 % (ref 40–54)
HGB BLD-MCNC: 10.8 G/DL (ref 14–18)
HGB BLD-MCNC: 10.9 G/DL (ref 14–18)
IMM GRANULOCYTES # BLD AUTO: 0.03 K/UL (ref 0–0.04)
IMM GRANULOCYTES NFR BLD AUTO: 0.3 % (ref 0–0.5)
LYMPHOCYTES # BLD AUTO: 2 K/UL (ref 1–4.8)
LYMPHOCYTES NFR BLD: 20.8 % (ref 18–48)
MAGNESIUM SERPL-MCNC: 1.6 MG/DL (ref 1.6–2.6)
MCH RBC QN AUTO: 28.4 PG (ref 27–31)
MCH RBC QN AUTO: 29.5 PG (ref 27–31)
MCHC RBC AUTO-ENTMCNC: 31.9 G/DL (ref 32–36)
MCHC RBC AUTO-ENTMCNC: 32.3 G/DL (ref 32–36)
MCV RBC AUTO: 89 FL (ref 82–98)
MCV RBC AUTO: 91 FL (ref 82–98)
MONOCYTES # BLD AUTO: 0.6 K/UL (ref 0.3–1)
MONOCYTES NFR BLD: 6.4 % (ref 4–15)
NEUTROPHILS # BLD AUTO: 7.1 K/UL (ref 1.8–7.7)
NEUTROPHILS NFR BLD: 72.3 % (ref 38–73)
NRBC BLD-RTO: 0 /100 WBC
PHOSPHATE SERPL-MCNC: 2.8 MG/DL (ref 2.7–4.5)
PLATELET # BLD AUTO: 149 K/UL (ref 150–450)
PLATELET # BLD AUTO: 152 K/UL (ref 150–450)
PMV BLD AUTO: 10.8 FL (ref 9.2–12.9)
PMV BLD AUTO: 9.9 FL (ref 9.2–12.9)
POTASSIUM SERPL-SCNC: 4 MMOL/L (ref 3.5–5.1)
RBC # BLD AUTO: 3.7 M/UL (ref 4.6–6.2)
RBC # BLD AUTO: 3.8 M/UL (ref 4.6–6.2)
SODIUM SERPL-SCNC: 139 MMOL/L (ref 136–145)
WBC # BLD AUTO: 9.81 K/UL (ref 3.9–12.7)
WBC # BLD AUTO: 9.82 K/UL (ref 3.9–12.7)

## 2024-10-05 PROCEDURE — 84100 ASSAY OF PHOSPHORUS: CPT

## 2024-10-05 PROCEDURE — G0378 HOSPITAL OBSERVATION PER HR: HCPCS

## 2024-10-05 PROCEDURE — 85027 COMPLETE CBC AUTOMATED: CPT | Mod: 91

## 2024-10-05 PROCEDURE — 96361 HYDRATE IV INFUSION ADD-ON: CPT

## 2024-10-05 PROCEDURE — 83735 ASSAY OF MAGNESIUM: CPT

## 2024-10-05 PROCEDURE — 63600175 PHARM REV CODE 636 W HCPCS

## 2024-10-05 PROCEDURE — 87040 BLOOD CULTURE FOR BACTERIA: CPT

## 2024-10-05 PROCEDURE — 36415 COLL VENOUS BLD VENIPUNCTURE: CPT

## 2024-10-05 PROCEDURE — 25000003 PHARM REV CODE 250

## 2024-10-05 PROCEDURE — 96366 THER/PROPH/DIAG IV INF ADDON: CPT

## 2024-10-05 PROCEDURE — 25000003 PHARM REV CODE 250: Performed by: STUDENT IN AN ORGANIZED HEALTH CARE EDUCATION/TRAINING PROGRAM

## 2024-10-05 PROCEDURE — 80048 BASIC METABOLIC PNL TOTAL CA: CPT

## 2024-10-05 PROCEDURE — 85025 COMPLETE CBC W/AUTO DIFF WBC: CPT

## 2024-10-05 RX ORDER — OXYCODONE HYDROCHLORIDE 5 MG/1
5 TABLET ORAL EVERY 6 HOURS PRN
Qty: 8 TABLET | Refills: 0 | Status: SHIPPED | OUTPATIENT
Start: 2024-10-05 | End: 2024-10-16

## 2024-10-05 RX ORDER — AMOXICILLIN AND CLAVULANATE POTASSIUM 875; 125 MG/1; MG/1
1 TABLET, FILM COATED ORAL 2 TIMES DAILY
Qty: 10 TABLET | Refills: 0 | Status: SHIPPED | OUTPATIENT
Start: 2024-10-05

## 2024-10-05 RX ORDER — AMOXICILLIN AND CLAVULANATE POTASSIUM 875; 125 MG/1; MG/1
1 TABLET, FILM COATED ORAL EVERY 12 HOURS
Status: DISCONTINUED | OUTPATIENT
Start: 2024-10-05 | End: 2024-10-05

## 2024-10-05 RX ADMIN — ACETAMINOPHEN 1000 MG: 500 TABLET ORAL at 01:10

## 2024-10-05 RX ADMIN — DULOXETINE HYDROCHLORIDE 60 MG: 60 CAPSULE, DELAYED RELEASE ORAL at 08:10

## 2024-10-05 RX ADMIN — CEFTRIAXONE 1 G: 1 INJECTION, POWDER, FOR SOLUTION INTRAMUSCULAR; INTRAVENOUS at 01:10

## 2024-10-05 RX ADMIN — CEFTRIAXONE SODIUM 2 G: 2 INJECTION, POWDER, FOR SOLUTION INTRAMUSCULAR; INTRAVENOUS at 05:10

## 2024-10-05 RX ADMIN — ATORVASTATIN CALCIUM 40 MG: 40 TABLET, FILM COATED ORAL at 08:10

## 2024-10-05 RX ADMIN — ACETAMINOPHEN 1000 MG: 500 TABLET ORAL at 05:10

## 2024-10-05 NOTE — PROGRESS NOTES
[facility] - [facility]  General Surgery  Progress Note    Subjective:     History of Present Illness:  Patient is a 74 y.o. male with history of T2DM and HTN who General Surgery was consulted for evaluation of concern for acute cholecystitis. States he developed crampy abdominal pain overnight associated with vomiting and diarrhea. Has had intermittent episodes similar to this several times over the last few months. Describes the pain as diffuse but states pain was worst in RUQ when examined by the ED before he received pain medications. Currently feeling better. Denies fever or chills. AFVSS on arrival to the ED. Labs with leukocytosis. CT and US showed distended gallbladder with cholelithiasis and borderline gallbladder wall thickening. Patient without history of abdominal surgery. Denies history of MI/stroke. Not on any blood thinners.     Post-Op Info:  Procedure(s) (LRB):  CHOLECYSTECTOMY, LAPAROSCOPIC (N/A)   1 Day Post-Op     Interval History: POD 1 lap amado. Pain controlled. Tolerating diet. Hbg stable     Medications:  Continuous Infusions:  Scheduled Meds:   acetaminophen  1,000 mg Oral Q8H    atorvastatin  40 mg Oral Daily    DULoxetine  60 mg Oral Daily    enoxparin  40 mg Subcutaneous Q24H (prophylaxis, 1700)     PRN Meds:  Current Facility-Administered Medications:     haloperidol lactate, 0.5 mg, Intravenous, Q10 Min PRN    LIDOcaine (PF) 10 mg/ml (1%), 1 mL, Intradermal, Once PRN    melatonin, 6 mg, Oral, Nightly PRN    ondansetron, 8 mg, Oral, Q8H PRN    oxyCODONE, 5 mg, Oral, Q4H PRN    oxyCODONE, 10 mg, Oral, Q4H PRN    sodium chloride 0.9%, 10 mL, Intravenous, PRN     Review of patient's allergies indicates:   Allergen Reactions    Aspirin      Due to acute kidney injury in 11/2013  Other reaction(s): Kidney Problems, renal failure    Nsaids (non-steroidal anti-inflammatory drug)      Due to acute kidney injury in 11/2013    Promethazine Other (See Comments)     Sedates patient  Other reaction(s):  Anxiety    Codeine Hives    Morphine Hives    Doxycycline Nausea Only     Objective:     Vital Signs (Most Recent):  Temp: 98 °F (36.7 °C) (10/05/24 0738)  Pulse: 72 (10/05/24 0738)  Resp: 20 (10/05/24 0738)  BP: (!) 173/73 (10/05/24 0738)  SpO2: 98 % (10/05/24 0738) Vital Signs (24h Range):  Temp:  [98 °F (36.7 °C)-98.9 °F (37.2 °C)] 98 °F (36.7 °C)  Pulse:  [71-92] 72  Resp:  [11-23] 20  SpO2:  [93 %-100 %] 98 %  BP: (131-173)/(62-88) 173/73     Weight: 95.1 kg (209 lb 9.6 oz)  Body mass index is 28.43 kg/m².    Intake/Output - Last 3 Shifts         10/03 0700  10/04 0659 10/04 0700  10/05 0659 10/05 0700  10/06 0659    IV Piggyback  800     Total Intake(mL/kg)  800 (8.4)     Urine (mL/kg/hr) 400 450 (0.2)     Total Output 400 450     Net -400 +350            Stool Occurrence   0 x             Physical Exam  Vitals and nursing note reviewed.   Constitutional:       General: He is not in acute distress.     Appearance: He is not ill-appearing.   Cardiovascular:      Rate and Rhythm: Normal rate and regular rhythm.   Abdominal:      General: There is no distension.      Palpations: Abdomen is soft.      Tenderness: There is abdominal tenderness.      Comments: Appropriately tender. Incisions c/d/I with dermabond    Skin:     General: Skin is warm and dry.   Neurological:      General: No focal deficit present.      Mental Status: He is alert.          Significant Labs:  I have reviewed all pertinent lab results within the past 24 hours.  CBC:   Recent Labs   Lab 10/05/24  0226   WBC 9.81   RBC 3.70*   HGB 10.9*   HCT 33.7*   *   MCV 91   MCH 29.5   MCHC 32.3     BMP:   Recent Labs   Lab 10/05/24  0226   *      K 4.0      CO2 23   BUN 18   CREATININE 1.0   CALCIUM 8.6*   MG 1.6       Significant Diagnostics:  I have reviewed all pertinent imaging results/findings within the past 24 hours.  Assessment/Plan:     * Calculus of gallbladder with acute cholecystitis without obstruction  74 y.o.  male with history of T2DM and HTN with cholelithiasis and acute cholecystitis.  S/p lap amado 10/4.     - regular diet   - last dose of IV antibiotics this morning  - d/c IVF  - repeat CBC 11am  - Prn pain meds and antiemetics   - home meds as appropriate  - dvt ppx     Dispo: if CBC remains stable, likely discharge home this afternoon         Rupali Pisano MD  General Surgery  [facility]

## 2024-10-05 NOTE — NURSING
Surgery on call notified of cultures resulting gram +cocci resembling strep. On  call notified that pt is currently on Rocephin Iv. No new orders were received. Will continue with the current  poc.

## 2024-10-05 NOTE — HOSPITAL COURSE
Mr. Hatch was admitted to the general surgery service on 10/04/2024 for management of acute cholecystitis. He underwent a laparoscopic cholecystectomy on 10/04/2024 which was performed without any apparent complication and patient was transferred to pacu in stable condition. Today, the patient's pain is well controlled with oral medication and he is tolerating diet, passing gas, and able to ambulate. He is medically appropriate for discharge and will be seen in clinic in 2 weeks for follow up.

## 2024-10-05 NOTE — DISCHARGE INSTRUCTIONS
You had a laparoscopic cholecystectomy performed.     Please alternate tylenol and ibuprofen for pain as directed by the bottle. You have also been prescribed a narcotic pain medication to help with post-operative pain.   Please make sure to take 1 capful of Miralax per day to help with narcotic associated constipation.     Please do not drive while on narcotic pain medication.    You have skin glue (dermabond) over your incision. Skin glue will fall off on its own  You may shower and let soapy water run over your incision, do not scrub. Please no baths or soaking for 2 weeks.    Gaylord no heavy lifting/pushing/pulling. Do not lift anything heavier than a gallon of milk (about 10-15 lbs) for the next 4-6 weeks.    You have no diet restrictions.    Please follow up in clinic with Dr. Armstrong in 2 weeks.

## 2024-10-05 NOTE — SUBJECTIVE & OBJECTIVE
Interval History: POD 1 lap amado. Pain controlled. Tolerating diet. Hbg stable     Medications:  Continuous Infusions:  Scheduled Meds:   acetaminophen  1,000 mg Oral Q8H    atorvastatin  40 mg Oral Daily    DULoxetine  60 mg Oral Daily    enoxparin  40 mg Subcutaneous Q24H (prophylaxis, 1700)     PRN Meds:  Current Facility-Administered Medications:     haloperidol lactate, 0.5 mg, Intravenous, Q10 Min PRN    LIDOcaine (PF) 10 mg/ml (1%), 1 mL, Intradermal, Once PRN    melatonin, 6 mg, Oral, Nightly PRN    ondansetron, 8 mg, Oral, Q8H PRN    oxyCODONE, 5 mg, Oral, Q4H PRN    oxyCODONE, 10 mg, Oral, Q4H PRN    sodium chloride 0.9%, 10 mL, Intravenous, PRN     Review of patient's allergies indicates:   Allergen Reactions    Aspirin      Due to acute kidney injury in 11/2013  Other reaction(s): Kidney Problems, renal failure    Nsaids (non-steroidal anti-inflammatory drug)      Due to acute kidney injury in 11/2013    Promethazine Other (See Comments)     Sedates patient  Other reaction(s): Anxiety    Codeine Hives    Morphine Hives    Doxycycline Nausea Only     Objective:     Vital Signs (Most Recent):  Temp: 98 °F (36.7 °C) (10/05/24 0738)  Pulse: 72 (10/05/24 0738)  Resp: 20 (10/05/24 0738)  BP: (!) 173/73 (10/05/24 0738)  SpO2: 98 % (10/05/24 0738) Vital Signs (24h Range):  Temp:  [98 °F (36.7 °C)-98.9 °F (37.2 °C)] 98 °F (36.7 °C)  Pulse:  [71-92] 72  Resp:  [11-23] 20  SpO2:  [93 %-100 %] 98 %  BP: (131-173)/(62-88) 173/73     Weight: 95.1 kg (209 lb 9.6 oz)  Body mass index is 28.43 kg/m².    Intake/Output - Last 3 Shifts         10/03 0700  10/04 0659 10/04 0700  10/05 0659 10/05 0700  10/06 0659    IV Piggyback  800     Total Intake(mL/kg)  800 (8.4)     Urine (mL/kg/hr) 400 450 (0.2)     Total Output 400 450     Net -400 +350            Stool Occurrence   0 x             Physical Exam  Vitals and nursing note reviewed.   Constitutional:       General: He is not in acute distress.     Appearance: He is not  ill-appearing.   Cardiovascular:      Rate and Rhythm: Normal rate and regular rhythm.   Abdominal:      General: There is no distension.      Palpations: Abdomen is soft.      Tenderness: There is abdominal tenderness.      Comments: Appropriately tender. Incisions c/d/I with dermabond    Skin:     General: Skin is warm and dry.   Neurological:      General: No focal deficit present.      Mental Status: He is alert.          Significant Labs:  I have reviewed all pertinent lab results within the past 24 hours.  CBC:   Recent Labs   Lab 10/05/24  0226   WBC 9.81   RBC 3.70*   HGB 10.9*   HCT 33.7*   *   MCV 91   MCH 29.5   MCHC 32.3     BMP:   Recent Labs   Lab 10/05/24  0226   *      K 4.0      CO2 23   BUN 18   CREATININE 1.0   CALCIUM 8.6*   MG 1.6       Significant Diagnostics:  I have reviewed all pertinent imaging results/findings within the past 24 hours.

## 2024-10-05 NOTE — PLAN OF CARE
[facility]  Discharge Final Note    Primary Care Provider: Eddie Delaney Jr., MD    Expected Discharge Date: 10/5/2024    Final Discharge Note (most recent)       Final Note - 10/05/24 1548          Final Note    Assessment Type Final Discharge Note     Anticipated Discharge Disposition Home or Self Care     What phone number can be called within the next 1-3 days to see how you are doing after discharge? [phone]     Hospital Resources/Appts/Education Provided Provided patient/caregiver with written discharge plan information        Post-Acute Status    Post-Acute Authorization Other     Other Status No Post-Acute Service Needs     Discharge Delays None known at this time                     Important Message from Medicare             Contact Info       Miguel Armstrong MD   Specialty: General Surgery    [address] [facility] [address]   Phone: [phone]       Next Steps: Follow up in 2 week(s)              Patient medically ready for discharge to home.  Family/patient aware of discharge.    Future Appointments   Date Time Provider Department Center   11/12/2024  8:15 AM LAB, [facility]   11/19/2024 10:40 AM Eddie Delaney Jr., MD [facility] Patel LMSW  - [facility]

## 2024-10-05 NOTE — ASSESSMENT & PLAN NOTE
74 y.o. male with history of T2DM and HTN with cholelithiasis and acute cholecystitis.  S/p lap amado 10/4.     - regular diet   - last dose of IV antibiotics this morning  - d/c IVF  - repeat CBC 11am  - Prn pain meds and antiemetics   - home meds as appropriate  - dvt ppx     Dispo: if CBC remains stable, likely discharge home this afternoon

## 2024-10-05 NOTE — DISCHARGE SUMMARY
[facility]  Colorectal Surgery  Discharge Summary      Patient Name: Ellis Hatch  MRN: [id number]  Admission Date: 10/4/2024  Hospital Length of Stay: 0 days  Discharge Date and Time:  10/05/2024 3:32 PM  Attending Physician: Miguel Armstrong MD   Discharging Provider: Jessica oCuch MD  Primary Care Provider: Eddie Delaney Jr., MD     HPI:  No notes on file    Procedure(s) (LRB):  CHOLECYSTECTOMY, LAPAROSCOPIC (N/A)     Hospital Course:  Mr. Hatch was admitted to the general surgery service on 10/04/2024 for management of acute cholecystitis. He underwent a laparoscopic cholecystectomy on 10/04/2024 which was performed without any apparent complication and patient was transferred to pacu in stable condition. Today, the patient's pain is well controlled with oral medication and he is tolerating diet, passing gas, and able to ambulate. He is medically appropriate for discharge and will be seen in clinic in 2 weeks for follow up.    Goals of Care Treatment Preferences:  Code Status: Full Code      Consults (From admission, onward)          Status Ordering Provider     Inpatient consult to General surgery  Once        Provider:  (Not yet assigned)    Completed AIMEE HERNÁNDEZ JR.            Significant Diagnostic Studies: Labs: CMP   Recent Labs   Lab 10/04/24  0124 10/05/24  0226    139   K 4.7 4.0    108   CO2 23 23   * 129*   BUN 32* 18   CREATININE 1.3 1.0   CALCIUM 9.9 8.6*   PROT 7.5  --    ALBUMIN 3.9  --    BILITOT 0.8  --    ALKPHOS 113  --    AST 20  --    ALT 23  --    ANIONGAP 11 8    and CBC   Recent Labs   Lab 10/04/24  0124 10/05/24  0226 10/05/24  1341   WBC 14.82* 9.81 9.82   HGB 13.5* 10.9* 10.8*   HCT 42.9 33.7* 33.9*    149* 152       Pending Diagnostic Studies:       Procedure Component Value Units Date/Time    Specimen to Pathology, Surgery General Surgery [[id number]] Collected: 10/04/24 1145    Order Status: Sent Lab Status: In process Updated:  10/04/24 1629    Specimen: Tissue           Final Active Diagnoses:    Diagnosis Date Noted POA    PRINCIPAL PROBLEM:  Calculus of gallbladder with acute cholecystitis without obstruction [K80.00] 10/04/2024 Yes      Problems Resolved During this Admission:      Discharged Condition: good    Disposition: Home or Self Care    Follow Up: clinic    Follow-up Information       Miguel Armstrong MD Follow up in 2 week(s).    Specialty: General Surgery  Contact information:  [address]  [facility] [address]  [phone]                           Patient Instructions:      Diet Adult Regular     Diet Adult Regular     Lifting restrictions   Order Comments: No lifting greater than 10 pounds for 6 weeks from day of surgery.  No pushing/pulling such as vacuuming or raking.  No straining, avoid constipation and take stool softeners as described and laxatives as needed.  No driving while on narcotics and until you can react quickly without pain.     No dressing needed   Order Comments: WOUND CARE  You have skin glue over your incision(s)  This will slowly flake away in about 10 days  It is okay to shower starting the day after surgery  Can pat your incision dry  Please do not scrub hard over your incisions  Please do not pick the glue off or it will reopen the wound     Notify your health care provider if you experience any of the following:  temperature >100.4     Notify your health care provider if you experience any of the following:  persistent nausea and vomiting or diarrhea     Notify your health care provider if you experience any of the following:  severe uncontrolled pain     Notify your health care provider if you experience any of the following:  redness, tenderness, or signs of infection (pain, swelling, redness, odor or green/yellow discharge around incision site)     Notify your health care provider if you experience any of the following:  difficulty breathing or increased cough     Notify your health  care provider if you experience any of the following:  severe persistent headache     Notify your health care provider if you experience any of the following:  worsening rash     Notify your health care provider if you experience any of the following:  persistent dizziness, light-headedness, or visual disturbances     Notify your health care provider if you experience any of the following:  increased confusion or weakness     Notify your health care provider if you experience any of the following:  temperature >100.4     Notify your health care provider if you experience any of the following:  persistent nausea and vomiting or diarrhea     Notify your health care provider if you experience any of the following:  severe uncontrolled pain     Notify your health care provider if you experience any of the following:  redness, tenderness, or signs of infection (pain, swelling, redness, odor or green/yellow discharge around incision site)     Notify your health care provider if you experience any of the following:  difficulty breathing or increased cough     Notify your health care provider if you experience any of the following:  severe persistent headache     Notify your health care provider if you experience any of the following:  worsening rash     Notify your health care provider if you experience any of the following:  persistent dizziness, light-headedness, or visual disturbances     Notify your health care provider if you experience any of the following:  increased confusion or weakness     Activity as tolerated   Order Comments: You had a laparoscopic cholecystectomy performed.     Please alternate tylenol and ibuprofen for pain as directed by the bottle. You have also been prescribed a narcotic pain medication to help with post-operative pain.   Please make sure to take 1 capful of Miralax per day to help with narcotic associated constipation.     Please do not drive while on narcotic pain medication.    You  have skin glue (dermabond) over your incision. Skin glue will fall off on its own  You may shower and let soapy water run over your incision, do not scrub. Please no baths or soaking for 2 weeks.    Sara no heavy lifting/pushing/pulling. Do not lift anything heavier than a gallon of milk (about 10-15 lbs) for the next 4-6 weeks.    You have no diet restrictions.    Please follow up in clinic with Dr. Armstrong in 2 weeks.     Medications:  Reconciled Home Medications:      Medication List        START taking these medications      amoxicillin-clavulanate 875-125mg 875-125 mg per tablet  Commonly known as: AUGMENTIN  Take 1 tablet by mouth 2 (two) times daily.     oxyCODONE 5 MG immediate release tablet  Commonly known as: ROXICODONE  Take 1 tablet (5 mg total) by mouth every 6 (six) hours as needed for Pain.            CONTINUE taking these medications      allopurinoL 100 MG tablet  Commonly known as: ZYLOPRIM  TAKE ONE TABLET BY MOUTH EVERY DAY TO PREVENT GOUT     ALPRAZolam 0.5 MG tablet  Commonly known as: XANAX  Take 1 tablet (0.5 mg total) by mouth daily as needed for Anxiety.     amLODIPine 10 MG tablet  Commonly known as: NORVASC  Take 1 tablet (10 mg total) by mouth once daily.     atorvastatin 40 MG tablet  Commonly known as: LIPITOR  Take 1 tablet (40 mg total) by mouth once daily.     BASAGLAR KWIKPEN U-100 INSULIN 100 unit/mL (3 mL) Inpn pen  Generic drug: insulin glargine U-100 (Lantus)  Inject 25 Units into the skin once daily.     CENTRUM SILVER ORAL  Take 1 tablet by mouth once daily.     DULoxetine 60 MG capsule  Commonly known as: CYMBALTA  Take 1 capsule (60 mg total) by mouth once daily.     fish oil-omega-3 fatty acids 300-1,000 mg capsule  Take 2 g by mouth once daily.     gabapentin 300 MG capsule  Commonly known as: NEURONTIN  Take 1 capsule (300 mg total) by mouth 2 (two) times daily.     glimepiride 4 MG tablet  Commonly known as: AMARYL  TAKE 1 TABLET TWICE DAILY WITH BREAKFAST AND  "SUPPER     glucosamine-condroitin-herb182 375-200-100 mg Cap  Take 1 tablet by mouth once daily.     magnesium citrate solution  Take 296 mLs by mouth daily as needed (constipation).     meloxicam 15 MG tablet  Commonly known as: MOBIC  TAKE ONE TABLET BY MOUTH ONCE daily AS directed with food FOR pain AND inflammation     metFORMIN 500 MG tablet  Commonly known as: GLUCOPHAGE  TAKE TWO TABLETS BY MOUTH TWICE DAILY WITH FOOD     ondansetron 4 MG tablet  Commonly known as: ZOFRAN  Take 1 tablet (4 mg total) by mouth every 6 (six) hours as needed for Nausea.     pantoprazole 40 MG tablet  Commonly known as: PROTONIX  Take 1 tablet (40 mg total) by mouth once daily.     pen needle, diabetic 32 gauge x 5/32" Ndle  Commonly known as: MICRODOT INSULIN PEN NEEDLE  Use to give insulin daily     sildenafiL 100 MG tablet  Commonly known as: VIAGRA  Take 1 tablet (100 mg total) by mouth daily as needed for Erectile Dysfunction.     tamsulosin 0.4 mg Cap  Commonly known as: FLOMAX  Take 1 capsule (0.4 mg total) by mouth once daily.     * TRULICITY 3 mg/0.5 mL pen injector  Generic drug: dulaglutide  Inject 3 mg into the skin every 7 days.     * TRULICITY 3 mg/0.5 mL pen injector  Generic drug: dulaglutide  Inject 3 mg into the skin every 7 days.           * This list has 2 medication(s) that are the same as other medications prescribed for you. Read the directions carefully, and ask your doctor or other care provider to review them with you.                  Jessica Couch MD  Colorectal Surgery  [facility]      "

## 2024-10-05 NOTE — DISCHARGE SUMMARY
[facility]  Colorectal Surgery  Discharge Summary      Patient Name: Ellis Hatch  MRN: [id number]  Admission Date: 10/4/2024  Hospital Length of Stay: 0 days  Discharge Date and Time:  10/05/2024 3:26 PM  Attending Physician: Miguel Armstrong MD   Discharging Provider: Jessica Couch MD  Primary Care Provider: Eddie Delaney Jr., MD     HPI:  No notes on file    Procedure(s) (LRB):  CHOLECYSTECTOMY, LAPAROSCOPIC (N/A)     Hospital Course:  Mr. Hatch was admitted to the general surgery service on 10/04/2024 for management of acute cholecystitis. He underwent a laparoscopic cholecystectomy on 10/04/2024 which was performed without any apparent complication and patient was transferred to pacu in stable condition. Today, the patient's pain is well controlled with oral medication and he is tolerating diet, passing gas, and able to ambulate. He is medically appropriate for discharge and will be seen in clinic in 2 weeks for follow up.    Goals of Care Treatment Preferences:  Code Status: Full Code      Consults (From admission, onward)          Status Ordering Provider     Inpatient consult to General surgery  Once        Provider:  (Not yet assigned)    Completed AIMEE HERNÁNDEZ JR.            Significant Diagnostic Studies: Labs: CMP   Recent Labs   Lab 10/04/24  0124 10/05/24  0226    139   K 4.7 4.0    108   CO2 23 23   * 129*   BUN 32* 18   CREATININE 1.3 1.0   CALCIUM 9.9 8.6*   PROT 7.5  --    ALBUMIN 3.9  --    BILITOT 0.8  --    ALKPHOS 113  --    AST 20  --    ALT 23  --    ANIONGAP 11 8    and CBC   Recent Labs   Lab 10/04/24  0124 10/05/24  0226 10/05/24  1341   WBC 14.82* 9.81 9.82   HGB 13.5* 10.9* 10.8*   HCT 42.9 33.7* 33.9*    149* 152       Pending Diagnostic Studies:       Procedure Component Value Units Date/Time    Specimen to Pathology, Surgery General Surgery [[id number]] Collected: 10/04/24 1145    Order Status: Sent Lab Status: In process Updated:  10/04/24 1629    Specimen: Tissue           Final Active Diagnoses:    Diagnosis Date Noted POA    PRINCIPAL PROBLEM:  Calculus of gallbladder with acute cholecystitis without obstruction [K80.00] 10/04/2024 Yes      Problems Resolved During this Admission:      Discharged Condition: good    Disposition: Home or Self Care    Follow Up: clinic    Follow-up Information       Miguel Armstrong MD Follow up in 2 week(s).    Specialty: General Surgery  Contact information:  [address]  [facility] [address]  [phone]                           Patient Instructions:      Diet Adult Regular     Diet Adult Regular     Lifting restrictions   Order Comments: No lifting greater than 10 pounds for 6 weeks from day of surgery.  No pushing/pulling such as vacuuming or raking.  No straining, avoid constipation and take stool softeners as described and laxatives as needed.  No driving while on narcotics and until you can react quickly without pain.     No dressing needed   Order Comments: WOUND CARE  You have skin glue over your incision(s)  This will slowly flake away in about 10 days  It is okay to shower starting the day after surgery  Can pat your incision dry  Please do not scrub hard over your incisions  Please do not pick the glue off or it will reopen the wound     Notify your health care provider if you experience any of the following:  temperature >100.4     Notify your health care provider if you experience any of the following:  persistent nausea and vomiting or diarrhea     Notify your health care provider if you experience any of the following:  severe uncontrolled pain     Notify your health care provider if you experience any of the following:  redness, tenderness, or signs of infection (pain, swelling, redness, odor or green/yellow discharge around incision site)     Notify your health care provider if you experience any of the following:  difficulty breathing or increased cough     Notify your health  care provider if you experience any of the following:  severe persistent headache     Notify your health care provider if you experience any of the following:  worsening rash     Notify your health care provider if you experience any of the following:  persistent dizziness, light-headedness, or visual disturbances     Notify your health care provider if you experience any of the following:  increased confusion or weakness     Notify your health care provider if you experience any of the following:  temperature >100.4     Notify your health care provider if you experience any of the following:  persistent nausea and vomiting or diarrhea     Notify your health care provider if you experience any of the following:  severe uncontrolled pain     Notify your health care provider if you experience any of the following:  redness, tenderness, or signs of infection (pain, swelling, redness, odor or green/yellow discharge around incision site)     Notify your health care provider if you experience any of the following:  difficulty breathing or increased cough     Notify your health care provider if you experience any of the following:  severe persistent headache     Notify your health care provider if you experience any of the following:  worsening rash     Notify your health care provider if you experience any of the following:  persistent dizziness, light-headedness, or visual disturbances     Notify your health care provider if you experience any of the following:  increased confusion or weakness     Activity as tolerated   Order Comments: You had a laparoscopic cholecystectomy performed.     Please alternate tylenol and ibuprofen for pain as directed by the bottle. You have also been prescribed a narcotic pain medication to help with post-operative pain.   Please make sure to take 1 capful of Miralax per day to help with narcotic associated constipation.     Please do not drive while on narcotic pain medication.    You  have skin glue (dermabond) over your incision. Skin glue will fall off on its own  You may shower and let soapy water run over your incision, do not scrub. Please no baths or soaking for 2 weeks.    Sara no heavy lifting/pushing/pulling. Do not lift anything heavier than a gallon of milk (about 10-15 lbs) for the next 4-6 weeks.    You have no diet restrictions.    Please follow up in clinic with Dr. Armstrong in 2 weeks.     Medications:  Reconciled Home Medications:      Medication List        START taking these medications      oxyCODONE 5 MG immediate release tablet  Commonly known as: ROXICODONE  Take 1 tablet (5 mg total) by mouth every 6 (six) hours as needed for Pain.            CONTINUE taking these medications      allopurinoL 100 MG tablet  Commonly known as: ZYLOPRIM  TAKE ONE TABLET BY MOUTH EVERY DAY TO PREVENT GOUT     ALPRAZolam 0.5 MG tablet  Commonly known as: XANAX  Take 1 tablet (0.5 mg total) by mouth daily as needed for Anxiety.     amLODIPine 10 MG tablet  Commonly known as: NORVASC  Take 1 tablet (10 mg total) by mouth once daily.     atorvastatin 40 MG tablet  Commonly known as: LIPITOR  Take 1 tablet (40 mg total) by mouth once daily.     BASAGLAR KWIKPEN U-100 INSULIN 100 unit/mL (3 mL) Inpn pen  Generic drug: insulin glargine U-100 (Lantus)  Inject 25 Units into the skin once daily.     CENTRUM SILVER ORAL  Take 1 tablet by mouth once daily.     DULoxetine 60 MG capsule  Commonly known as: CYMBALTA  Take 1 capsule (60 mg total) by mouth once daily.     fish oil-omega-3 fatty acids 300-1,000 mg capsule  Take 2 g by mouth once daily.     gabapentin 300 MG capsule  Commonly known as: NEURONTIN  Take 1 capsule (300 mg total) by mouth 2 (two) times daily.     glimepiride 4 MG tablet  Commonly known as: AMARYL  TAKE 1 TABLET TWICE DAILY WITH BREAKFAST AND SUPPER     glucosamine-condroitin-herb182 375-200-100 mg Cap  Take 1 tablet by mouth once daily.     magnesium citrate solution  Take 296 mLs  "by mouth daily as needed (constipation).     meloxicam 15 MG tablet  Commonly known as: MOBIC  TAKE ONE TABLET BY MOUTH ONCE daily AS directed with food FOR pain AND inflammation     metFORMIN 500 MG tablet  Commonly known as: GLUCOPHAGE  TAKE TWO TABLETS BY MOUTH TWICE DAILY WITH FOOD     ondansetron 4 MG tablet  Commonly known as: ZOFRAN  Take 1 tablet (4 mg total) by mouth every 6 (six) hours as needed for Nausea.     pantoprazole 40 MG tablet  Commonly known as: PROTONIX  Take 1 tablet (40 mg total) by mouth once daily.     pen needle, diabetic 32 gauge x 5/32" Ndle  Commonly known as: MICRODOT INSULIN PEN NEEDLE  Use to give insulin daily     sildenafiL 100 MG tablet  Commonly known as: VIAGRA  Take 1 tablet (100 mg total) by mouth daily as needed for Erectile Dysfunction.     tamsulosin 0.4 mg Cap  Commonly known as: FLOMAX  Take 1 capsule (0.4 mg total) by mouth once daily.     * TRULICITY 3 mg/0.5 mL pen injector  Generic drug: dulaglutide  Inject 3 mg into the skin every 7 days.     * TRULICITY 3 mg/0.5 mL pen injector  Generic drug: dulaglutide  Inject 3 mg into the skin every 7 days.           * This list has 2 medication(s) that are the same as other medications prescribed for you. Read the directions carefully, and ask your doctor or other care provider to review them with you.                  Jessica Couch MD  Colorectal Surgery  [facility]      "

## 2024-10-05 NOTE — PLAN OF CARE
10/05/24 1546   Post-Acute Status   Post-Acute Authorization Other   Other Status No Post-Acute Service Needs   Discharge Delays None known at this time   Discharge Plan   Discharge Plan A Home   Discharge Plan B Home     Patient cleared for discharge from case management standpoint.      Chart and discharge orders reviewed.  Patient discharged home with no further case management needs.      Discharge Plan A and Plan B have been determined by review of patient's clinical status, future medical and therapeutic needs, and coverage/benefits for post-acute care in coordination with multidisciplinary team members.     Melva Patel, SOTERO   - [facility]

## 2024-10-06 LAB
BACTERIA BLD CULT: ABNORMAL

## 2024-10-08 ENCOUNTER — PATIENT OUTREACH (OUTPATIENT)
Dept: ADMINISTRATIVE | Facility: CLINIC | Age: 74
End: 2024-10-08
Payer: MEDICARE

## 2024-10-08 NOTE — PROGRESS NOTES
C3 nurse spoke with Ellis Hatch' wife for a TCC post hospital discharge follow up call. The patient does not have a scheduled HOSFU appointment with Eddie Delaney Jr., MD within 5-7 days post hospital discharge date 10/05/24. C3 nurse was unable to schedule HOSFU appointment in [facility].    Message sent to PCP staff requesting they contact patient and schedule follow up appointment.      No NP in pts. Area.

## 2024-10-08 NOTE — ANESTHESIA POSTPROCEDURE EVALUATION
Anesthesia Post Evaluation    Patient: Ellis Hatch    Procedure(s) Performed: Procedure(s) (LRB):  CHOLECYSTECTOMY, LAPAROSCOPIC (N/A)    Final Anesthesia Type: general      Patient location during evaluation: PACU  Patient participation: Yes- Able to Participate  Level of consciousness: awake and alert and oriented  Post-procedure vital signs: reviewed and stable  Pain management: adequate  Airway patency: patent  ENZO mitigation strategies: Intraoperative administration of CPAP, nasopharyngeal airway, or oral appliance during sedation, Multimodal analgesia, Extubation while patient is awake, Verification of full reversal of neuromuscular block and Extubation and recovery carried out in lateral, semiupright, or other nonsupine position  PONV status at discharge: No PONV  Anesthetic complications: no      Cardiovascular status: hemodynamically stable  Respiratory status: unassisted  Hydration status: euvolemic  Follow-up not needed.              Vitals Value Taken Time   /65 10/05/24 1154   Temp 36.8 °C (98.2 °F) 10/05/24 1154   Pulse 74 10/05/24 1154   Resp 20 10/05/24 1154   SpO2 96 % 10/05/24 1154         Event Time   Out of Recovery 12:30:00         Pain/Jorge Score: No data recorded

## 2024-10-09 ENCOUNTER — TELEPHONE (OUTPATIENT)
Dept: INTERNAL MEDICINE | Facility: CLINIC | Age: 74
End: 2024-10-09
Payer: MEDICARE

## 2024-10-10 LAB — BACTERIA BLD CULT: NORMAL

## 2024-10-14 ENCOUNTER — TELEPHONE (OUTPATIENT)
Dept: INTERNAL MEDICINE | Facility: CLINIC | Age: 74
End: 2024-10-14
Payer: MEDICARE

## 2024-10-14 NOTE — TELEPHONE ENCOUNTER
Rec'd call asking for pre-op clearance form.  Pt is set to have a Laminectomy on 10/21/24.   Pt was last seen in June 2024 prior to a different procedure.

## 2024-10-14 NOTE — TELEPHONE ENCOUNTER
----- Message from Iris sent at 10/14/2024 12:08 PM CDT -----  Contact: [phone]  [email or web address]     Patient is calling for Medical Advice regarding: Pt wife Shruthi said she needs a call back all the info she gave me     How long has patient had these symptoms:    Pharmacy name and phone#:    Patient wants a call back or thru [facility]: call back     Comments:    Please advise patient replies from provider may take up to 48 hours.

## 2024-10-15 LAB
FINAL PATHOLOGIC DIAGNOSIS: NORMAL
GROSS: NORMAL
Lab: NORMAL

## 2024-10-16 ENCOUNTER — OFFICE VISIT (OUTPATIENT)
Dept: SURGERY | Facility: CLINIC | Age: 74
End: 2024-10-16
Payer: MEDICARE

## 2024-10-16 ENCOUNTER — OFFICE VISIT (OUTPATIENT)
Dept: INTERNAL MEDICINE | Facility: CLINIC | Age: 74
End: 2024-10-16
Payer: MEDICARE

## 2024-10-16 VITALS
OXYGEN SATURATION: 99 % | SYSTOLIC BLOOD PRESSURE: 131 MMHG | WEIGHT: 194.31 LBS | HEART RATE: 87 BPM | BODY MASS INDEX: 26.32 KG/M2 | DIASTOLIC BLOOD PRESSURE: 63 MMHG | HEIGHT: 72 IN

## 2024-10-16 VITALS
OXYGEN SATURATION: 97 % | WEIGHT: 197.56 LBS | SYSTOLIC BLOOD PRESSURE: 126 MMHG | HEIGHT: 72 IN | DIASTOLIC BLOOD PRESSURE: 62 MMHG | BODY MASS INDEX: 26.76 KG/M2 | HEART RATE: 91 BPM

## 2024-10-16 DIAGNOSIS — Z90.49 S/P LAPAROSCOPIC CHOLECYSTECTOMY: Primary | ICD-10-CM

## 2024-10-16 DIAGNOSIS — J43.9 PULMONARY EMPHYSEMA, UNSPECIFIED EMPHYSEMA TYPE: ICD-10-CM

## 2024-10-16 DIAGNOSIS — E78.5 HYPERLIPIDEMIA, UNSPECIFIED HYPERLIPIDEMIA TYPE: ICD-10-CM

## 2024-10-16 DIAGNOSIS — E11.9 TYPE 2 DIABETES MELLITUS WITHOUT COMPLICATION, WITHOUT LONG-TERM CURRENT USE OF INSULIN: ICD-10-CM

## 2024-10-16 DIAGNOSIS — E11.49 TYPE II DIABETES MELLITUS WITH NEUROLOGICAL MANIFESTATIONS: ICD-10-CM

## 2024-10-16 DIAGNOSIS — N18.31 STAGE 3A CHRONIC KIDNEY DISEASE: ICD-10-CM

## 2024-10-16 DIAGNOSIS — F41.9 ANXIETY: Primary | ICD-10-CM

## 2024-10-16 DIAGNOSIS — Z72.0 TOBACCO ABUSE: ICD-10-CM

## 2024-10-16 DIAGNOSIS — K80.00 CALCULUS OF GALLBLADDER WITH ACUTE CHOLECYSTITIS WITHOUT OBSTRUCTION: ICD-10-CM

## 2024-10-16 DIAGNOSIS — K63.5 POLYP OF COLON, UNSPECIFIED PART OF COLON, UNSPECIFIED TYPE: ICD-10-CM

## 2024-10-16 PROCEDURE — 99999 PR PBB SHADOW E&M-EST. PATIENT-LVL IV: CPT | Mod: PBBFAC,,, | Performed by: INTERNAL MEDICINE

## 2024-10-16 PROCEDURE — 99215 OFFICE O/P EST HI 40 MIN: CPT | Mod: S$PBB,,, | Performed by: INTERNAL MEDICINE

## 2024-10-16 PROCEDURE — 99214 OFFICE O/P EST MOD 30 MIN: CPT | Mod: PBBFAC,27 | Performed by: STUDENT IN AN ORGANIZED HEALTH CARE EDUCATION/TRAINING PROGRAM

## 2024-10-16 PROCEDURE — 99024 POSTOP FOLLOW-UP VISIT: CPT | Mod: POP,,, | Performed by: STUDENT IN AN ORGANIZED HEALTH CARE EDUCATION/TRAINING PROGRAM

## 2024-10-16 PROCEDURE — 99999 PR PBB SHADOW E&M-EST. PATIENT-LVL IV: CPT | Mod: PBBFAC,,, | Performed by: STUDENT IN AN ORGANIZED HEALTH CARE EDUCATION/TRAINING PROGRAM

## 2024-10-16 PROCEDURE — 99214 OFFICE O/P EST MOD 30 MIN: CPT | Mod: PBBFAC | Performed by: INTERNAL MEDICINE

## 2024-10-16 RX ORDER — ALPRAZOLAM 0.5 MG/1
0.5 TABLET ORAL DAILY PRN
Qty: 15 TABLET | Refills: 2 | Status: SHIPPED | OUTPATIENT
Start: 2024-10-16

## 2024-10-16 NOTE — PATIENT INSTRUCTIONS
Hold meloxican 1 week before surgery  Hold all NSAID's ( advil, alieve, etc ) 1 week before surgery   Holds dulaglutide  2 weeks before surgery

## 2024-10-16 NOTE — PROGRESS NOTES
"  This is a 74 -year-old gentleman follow up for his medical problem.  Blood sugar has been running "pretty good" . I last saw him in  June 2024 for preop for this procedure but then he ended up getting a neck procedure.  Has had had PT-- doing better--  He has a laparoscopic cholecystectomy Oct 4th- less than 2 weeks ago.  He had this for his diarrhea.    He  is here for pre-op for Decompression with Coflex L3/L4 and L4/L5.   Surgery going to be later this month.           Needs C-scope-       . PMHx of HTN, acute kidney injury, kidney stones, and DM. PSHx of hernia repair and colonoscopy. Social hx of using skoal all day for the past 40 years.  He denies any polyuria.  NO recent steroids.  Got new machine and glucose have been doing better.               Diabetes mellitus type 2. He has had DM for 20 years.  He is on   Amaryl 4 mg in the morning, metformin, Lantus -25 units.     and trulicity-   -   . His hemoglobin A1c 6.9  (10/2024) .  Recent GLu 129  -.    He has been moving around more. He is able to work, but not exercising very much. No low Glucoses at home. He is having some nocturnal burning in feet.            2. He has chronic kidney disease, status post acute kidney injury back in  December 2013. Creatinine had gone up to 2.0 to 2.3 twice I the past  (2014 & 2018) . He had renal stones and    had lithotripsy last year. Most recent creatinine is 1.0 , putting him at  stage II chronic kidney disease.   He has renal stones and sees a urologist in [place] for renal stones. .           3. He has hyperlipidemia, which he has had for several years. He is on  Lipitor. His cholesterol was 112,, HDL 36 , LDL is 43.6, and triglycerides    are 162 - these are from  5/2024.        4. He has OA of both knees, s/p right  knee replacement 2013. And left knee replacement in 2018.    Gong to try to see Ortho in [place].  He is also seeing a spine doctor for his back  has had 2 epidural.  Rare Alieve      5. Torn " rotator cuffs- He is going to see MD in [facility] for his shoulder.  .  Did not have surgery.  He is getting dry needling.  this is fine   He has been doing ross desk work recnetly and that has been been aggravating him.       6. ENZO- using CPAP every night and is doing well. Feels well rested.  Sees his sleep doctor next visit.       7. htn -- bp is  controled to day-126/62  Last visit we started him on amlodipine 5mg a day.  He has all his supplies.      8.  Recent Cholecystectomy.  Doing well.  NO loose stools in the last week.          He has had several skin cancers removed.  Follows up regularly with his dermatologist in [place].                      ROS : Gen - no fatigue -- he  down 9 #       Eyes - no eye pain  - he has had some blurriness. -     ENT - no hoarseness or sore throat  CV - No chest pain or SOB. NO palpitations.  Can walk up 2 flights of stairs at work without stopping to rest or having chest pain .    Pulm - no cough or wheezing  GI - no N/V/D   no dysuria or incontinence  MS - no joint pain or muscle pain  Skin - no rash, or c/o of skin lesions  Neuro -  dizziness--- memory is doing well. -  Heme - no abnormal bleeding or bruising  Endo - no polydipsia, or temperature changes  Psych - ok  Foot- left foot second toe- clawed.        PHYSICAL EXAMINATION:           /62 (BP Location: Right arm, Patient Position: Sitting)   Pulse 91   Ht 6' (1.829 m)   Wt 89.6 kg (197 lb 8.5 oz)   SpO2 97%   BMI 26.79 kg/m²         General appearance: alert, appears stated age and cooperative- not moving around room very well due to back.    Head: Normocephalic, without obvious abnormality, atraumatic-  He has a t shaped wound o his scalp-  Left front-- stood up under his tool box.    Ears: normal TM's and external ear canals both ears  Nose: Nares normal. Septum midline. Mucosa normal. No drainage or sinus tenderness.  Throat: lips, mucosa, and tongue normal; teeth and gums normal  Neck: no  adenopathy, no carotid bruit, no JVD, supple, symmetrical, trachea midline and thyroid not enlarged, symmetric, no tenderness/mass/nodules  Back: symmetric, no curvature. ROM normal. No CVA tenderness.  Lungs: clear to auscultation bilaterally  Chest wall: no tenderness  Heart: regular rate and rhythm, S1, S2 normal, no murmur, click, rub or gallop  Abdomen: soft, non-tender; bowel sounds normal; -- He has small laparoscopic scars- healing well.   Extremities: extremities normal, atraumatic, no cyanosis or edema    Pulses: 2+ and symmetric  Skin: Skin color, texture, turgor normal. No rashes or lesions       Protective Sensation (w/ 10 gram monofilament):  Right: Absent  Left: Absent    Visual Inspection:  Normal -  Bilateral    Pedal Pulses:   Right: Present  Left: Present    Posterior Tibialis Pulses:   Right:Diminished  Left: Diminished                   ASSESSMENT:  1. Diabetes mellitus type 2:  -   trulicity 3 mg /week.      levemir has been discontinued  will take Basaglar  in it;s place.  .  Amaryl 4mg bid, metformin 1000mg Bid. We discussed appropriate diet and exercise. Discussed hypoglycemia.  He walks daily.  .      .  Work on diet and discussed exercise.     labs today -- follow up in 6  months       2. Anxiety-- He is cutting  down to using 1/2 xanax every morning-  .    stay on cymbalta. .drug  reviewed.  3. He has obstructive sleep apnea for which he uses CPAP. He was using it every night- he wake up well refreshed.    4. ED-- he has tried Viagra- cialias.    5. Back pain- seeing Chiropractor and either ortho or Neuroseugery  for this.- off NSAID due to h/o DEE.       6. Peripheral neuropathy.  -Had to come off   elavil  == He is on this due to his oa of the knees and shoulder pains.  He had ARF in 2013 on NSAIDs.  We took him off the Mobic because of acute renal failure and his chronic kidney disease. He is on gabapentin 300 mg a day- maybe at night-- will increase to 300 mg twice daily.   7. HTN-  better on  amlodipine to 10  mg a day -    8.  episodic N/V-- this has resolved/ or been a lot better  since Feb 2022  9. Coccydynia-  discuss sitting cushion- last visit - may need lumbar surgery    10 PVD-- control bp, chol and diabetes.    11. Afib was in 2013-- no new episodes.    12.  Emphysematous changes are suggested.  Atelectasis and/or scar suggested at the lung bases.  4 mm solid nodule suggested in the right lower lobe of the lung (series 2, image 9. this appears essentially unchanged since 06/04/2014 abdomen pelvis CT and as such is favored to be benign.-- on CT of abdomen from 2/12/21--  Has stopped smoking- On July 8th 2024.    13. Colon polysp-- due back in 2024- will wait til after surgery                   14.  Spinal stenoisis-- cleared for anesthesia  and surgery       15. Tobacco abuse---stopped smoking- July 2024.          Our office providers are the focal point for all needed health care services that are part of ongoing care related to a patient's single serious condition or the patient's complex conditions.      Follow up in 6  months with labs       Let me know if has trouble getting insulin-- THEY HAVE THE 3 mg trulicity here - so will get it filled here today.          He is medically cleared for anesthesia and surgery  -Will fax recnet labs and cxr, EKG--   Hold meloxican 1 week before surgery  Hold all NSAID's ( advil, alieve, etc ) 1 week before surgery   Holds dulaglutide  2 weeks before surgery

## 2024-10-16 NOTE — PROGRESS NOTES
[facility]  General Surgery  Post-operative Note    Subjective:       Ellis Hatch presents to the clinic 2 weeks following lap amado for acute cholecystitis with cholelithiasis. Eating a regular diet without difficulty. He initially experienced post-operative diarrhea that has since resolved.  Endorses soreness around umbilical incision. Pain is controlled without any medications..      Objective:      /63 (BP Location: Right arm, Patient Position: Sitting)   Pulse 87   Ht 6' (1.829 m)   Wt 88.2 kg (194 lb 5.4 oz)   SpO2 99%   BMI 26.36 kg/m²     General:  alert, appears stated age, cooperative, and no distress   Abdomen: soft, non-tender, non-distended   Incisions:   healing well, no drainage, no erythema, no hernia, no seroma, no swelling, mild bruising around left lateral incision, no dehiscence, incision well approximated       Pathology Results  (Last 30 days)                 10/04/24 1145  Specimen to Pathology, Surgery General Surgery Final result    Narrative:  Pre-op Diagnosis: Calculus of gallbladder with acute   cholecystitis without obstruction [K80.00]   Procedure(s):   CHOLECYSTECTOMY, LAPAROSCOPIC   Number of specimens: 1   Name of specimens: 1.) Gallbladder   Release to patient->Immediate   Specimen total (fresh, frozen, permanent):->1               Assessment:      Ellis Hatch is a 74 y.o. male with history of T2DM and HTN now s/p lap amado for acute cholecystitis with cholelithiasis.    Plan:      1. Continue any current medications.  2. Wound care discussed.  3. Pt is to increase activities as tolerated.  4. Follow up as needed    Adore Berman MD  General Surgery PGY-1  10/16/2024

## 2024-10-21 ENCOUNTER — TELEPHONE (OUTPATIENT)
Dept: SURGERY | Facility: CLINIC | Age: 74
End: 2024-10-21
Payer: MEDICARE

## 2024-10-21 NOTE — TELEPHONE ENCOUNTER
----- Message from Bere sent at 10/21/2024  3:03 PM CDT -----  Regarding: appt  Contact: @ [phone]  Pt wife called in regards to speaking with someone about her  and his after care per the wife he is having stomach pains, vomiting, and no diarrhea .....Please call and adv @ [phone]

## 2024-10-22 ENCOUNTER — TELEPHONE (OUTPATIENT)
Dept: SURGERY | Facility: CLINIC | Age: 74
End: 2024-10-22
Payer: MEDICARE

## 2024-10-22 NOTE — TELEPHONE ENCOUNTER
----- Message from Jayne sent at 10/22/2024  3:10 PM CDT -----  Regarding: appt  Contact: [phone]  Patient's wife ( Shruthi ) is calling because she believes she spoke with someone from Dr Armstrong's office and that a appt was suppose to be set up for Mr Hatch some time at the end of the month but we do not show a appt in they system. Please contact patient's wife ( Shruthi ) to verify if her  is suppose to have a appt at [phone]

## 2024-10-22 NOTE — TELEPHONE ENCOUNTER
Spoke with pt's wife again today, reinforcing yesterday's conversation of monitoring pt, Mr. Hatch, for any further incident of severe stomach pain, nausea, vomiting,or other GI disturbances.Pt has been without incident since 1 time occurrence Saturday morning 10/19/24. States that he feels fine. Pt returned to work Monday without incident. Wife voiced understanding and  believes she confused this appointment with another she was attempting to schedule with a different provider.

## 2024-10-25 NOTE — TELEPHONE ENCOUNTER
Spoke to Mrs. Hatch, she stated that pt was seen on Thursday and they thought everything was fine when he left. They then were called to schedule a referral to see general surgery. They were able to schedule appt on the same day as appt with you, Thursday 2/17/22. Appt times are kind of close and she wanted to know if you can see them earlier or later in the afternoon because they don't want to miss their appt with you. Pt is scheduled to see general surgery at 9:30a and 10:20a for your appt. Please advise...    Pt wife also wants you to review notes from visit with gastro.    Nereyda DELCID     Electrolyte imbalance

## 2024-10-29 RX ORDER — MELOXICAM 15 MG/1
TABLET ORAL
Qty: 30 TABLET | Refills: 2 | Status: SHIPPED | OUTPATIENT
Start: 2024-10-29

## 2024-11-04 ENCOUNTER — TELEPHONE (OUTPATIENT)
Dept: SURGERY | Facility: CLINIC | Age: 74
End: 2024-11-04
Payer: MEDICARE

## 2024-11-04 NOTE — TELEPHONE ENCOUNTER
----- Message from Faith sent at 11/4/2024  1:35 PM CST -----  Regarding: pt advice  Contact: [phone]  Shruthi/spouse calling to advise patient went to urgent care Friday Nov 1, 2024 for red belly button. Pls call to discuss patient's condition

## 2024-11-04 NOTE — TELEPHONE ENCOUNTER
She Regina 10/4-Wife reported noting suture sticking out from incision. A couple days later it was 2 light colored sutures protruding out with pt experiencing tenderness/redness around navel, decreased appetite and fatigue. Seen in Urgent care 11/1/24 and was started on Clindamycin 300mg 1Tab QID x 7d. Reports feeling much better at this time and does not feel the need to be seen, just wanted to inform provider. Advised to complete entire ABT course, drink plenty fluids,  snip sutures flush with skin and/or cover with bandaid to prevent pulling/snagging if desired. Continue to monitor for and report any s/s of infection. Voiced understanding.

## 2024-11-12 ENCOUNTER — LAB VISIT (OUTPATIENT)
Dept: LAB | Facility: HOSPITAL | Age: 74
End: 2024-11-12
Attending: INTERNAL MEDICINE
Payer: MEDICARE

## 2024-11-12 DIAGNOSIS — E78.5 HYPERLIPIDEMIA, UNSPECIFIED HYPERLIPIDEMIA TYPE: ICD-10-CM

## 2024-11-12 DIAGNOSIS — E11.9 TYPE 2 DIABETES MELLITUS WITHOUT COMPLICATION, WITHOUT LONG-TERM CURRENT USE OF INSULIN: ICD-10-CM

## 2024-11-12 LAB
ALBUMIN SERPL BCP-MCNC: 3.7 G/DL (ref 3.5–5.2)
ALP SERPL-CCNC: 98 U/L (ref 40–150)
ALT SERPL W/O P-5'-P-CCNC: 23 U/L (ref 10–44)
ANION GAP SERPL CALC-SCNC: 7 MMOL/L (ref 8–16)
AST SERPL-CCNC: 24 U/L (ref 10–40)
BILIRUB SERPL-MCNC: 0.4 MG/DL (ref 0.1–1)
BUN SERPL-MCNC: 26 MG/DL (ref 8–23)
CALCIUM SERPL-MCNC: 9.4 MG/DL (ref 8.7–10.5)
CHLORIDE SERPL-SCNC: 108 MMOL/L (ref 95–110)
CHOLEST SERPL-MCNC: 94 MG/DL (ref 120–199)
CHOLEST/HDLC SERPL: 2.7 {RATIO} (ref 2–5)
CO2 SERPL-SCNC: 23 MMOL/L (ref 23–29)
CREAT SERPL-MCNC: 1.3 MG/DL (ref 0.5–1.4)
EST. GFR  (NO RACE VARIABLE): 58.4 ML/MIN/1.73 M^2
ESTIMATED AVG GLUCOSE: 140 MG/DL (ref 68–131)
GLUCOSE SERPL-MCNC: 98 MG/DL (ref 70–110)
HBA1C MFR BLD: 6.5 % (ref 4–5.6)
HDLC SERPL-MCNC: 35 MG/DL (ref 40–75)
HDLC SERPL: 37.2 % (ref 20–50)
LDLC SERPL CALC-MCNC: 35.2 MG/DL (ref 63–159)
NONHDLC SERPL-MCNC: 59 MG/DL
POTASSIUM SERPL-SCNC: 5.1 MMOL/L (ref 3.5–5.1)
PROT SERPL-MCNC: 6.9 G/DL (ref 6–8.4)
SODIUM SERPL-SCNC: 138 MMOL/L (ref 136–145)
TRIGL SERPL-MCNC: 119 MG/DL (ref 30–150)

## 2024-11-12 PROCEDURE — 36415 COLL VENOUS BLD VENIPUNCTURE: CPT | Performed by: INTERNAL MEDICINE

## 2024-11-12 PROCEDURE — 83036 HEMOGLOBIN GLYCOSYLATED A1C: CPT | Performed by: INTERNAL MEDICINE

## 2024-11-12 PROCEDURE — 80053 COMPREHEN METABOLIC PANEL: CPT | Performed by: INTERNAL MEDICINE

## 2024-11-12 PROCEDURE — 80061 LIPID PANEL: CPT | Performed by: INTERNAL MEDICINE

## 2024-11-19 ENCOUNTER — OFFICE VISIT (OUTPATIENT)
Dept: INTERNAL MEDICINE | Facility: CLINIC | Age: 74
End: 2024-11-19
Payer: MEDICARE

## 2024-11-19 VITALS
HEIGHT: 72 IN | WEIGHT: 199.5 LBS | HEART RATE: 75 BPM | SYSTOLIC BLOOD PRESSURE: 134 MMHG | OXYGEN SATURATION: 97 % | DIASTOLIC BLOOD PRESSURE: 64 MMHG | BODY MASS INDEX: 27.02 KG/M2

## 2024-11-19 DIAGNOSIS — Z72.0 TOBACCO ABUSE: ICD-10-CM

## 2024-11-19 DIAGNOSIS — F41.9 ANXIETY: Primary | ICD-10-CM

## 2024-11-19 DIAGNOSIS — E78.5 HYPERLIPIDEMIA, UNSPECIFIED HYPERLIPIDEMIA TYPE: ICD-10-CM

## 2024-11-19 DIAGNOSIS — E11.49 TYPE II DIABETES MELLITUS WITH NEUROLOGICAL MANIFESTATIONS: ICD-10-CM

## 2024-11-19 DIAGNOSIS — K63.5 POLYP OF COLON, UNSPECIFIED PART OF COLON, UNSPECIFIED TYPE: ICD-10-CM

## 2024-11-19 DIAGNOSIS — I48.0 PAROXYSMAL ATRIAL FIBRILLATION: ICD-10-CM

## 2024-11-19 DIAGNOSIS — N18.31 STAGE 3A CHRONIC KIDNEY DISEASE: ICD-10-CM

## 2024-11-19 DIAGNOSIS — Z96.653 STATUS POST TOTAL BILATERAL KNEE REPLACEMENT: ICD-10-CM

## 2024-11-19 DIAGNOSIS — E11.9 TYPE 2 DIABETES MELLITUS WITHOUT COMPLICATION, WITHOUT LONG-TERM CURRENT USE OF INSULIN: ICD-10-CM

## 2024-11-19 PROCEDURE — 99999 PR PBB SHADOW E&M-EST. PATIENT-LVL IV: CPT | Mod: PBBFAC,,, | Performed by: INTERNAL MEDICINE

## 2024-11-19 PROCEDURE — 99214 OFFICE O/P EST MOD 30 MIN: CPT | Mod: S$PBB,,, | Performed by: INTERNAL MEDICINE

## 2024-11-19 PROCEDURE — 99214 OFFICE O/P EST MOD 30 MIN: CPT | Mod: PBBFAC | Performed by: INTERNAL MEDICINE

## 2024-11-19 NOTE — PROGRESS NOTES
"  This is a 74 -year-old gentleman follow up for his medical problem.  Blood sugar has been running "pretty good" . I last saw him in  June 2024 for preop for this procedure but then he ended up getting a neck procedure.  Has had had PT-- doing better--    He had this for his diarrhea.    He  is scheduled for  Decompression with Coflex L3/L4 and L4/L5 next Wednesday.         Needs C-scope-  WIll discuss after healed from neck surgery        . PMHx of HTN, acute kidney injury, kidney stones, and DM. PSHx of hernia repair and colonoscopy.  He had a laparoscopic cholecystectomy Oct 4th- 2024              Diabetes mellitus type 2. He has had DM for 20 years.  He is on   Amaryl 4 mg in the morning, metformin, Lantus -25 units.     and trulicity-   -   . His hemoglobin A1c 6.5  (11/2024) .  Recent GLu 129  -.    He has been moving around more. He is able to work, but not exercising very much. No low Glucoses at home. He is having some nocturnal burning in feet.            2. He has chronic kidney disease, status post acute kidney injury back in  December 2013. Creatinine had gone up to 2.0 to 2.3 twice I the past  (2014 & 2018) . He had renal stones and    had lithotripsy last year. Most recent creatinine is 1.3 , putting him at  stage 3a-- chronic kidney disease.   He has renal stones and sees a urologist in [place] for renal stones. .           3. He has hyperlipidemia, which he has had for several years. He is on  Lipitor. His cholesterol was  94 , HDL 35 , LDL is 35.2 , and triglycerides    are 162 - these are from  11/2024.        4. He has OA of both knees, s/p right  knee replacement 2013. And left knee replacement in 2018.    Gong to try to see Ortho in [place].  He is also seeing a spine doctor for his back  has had 2 epidural.  Rare Alieve      5. Torn rotator cuffs- He is going to see MD in [facility] for his shoulder.  .  Did not have surgery.  He is getting dry needling.  this is fine   He has been doing ross " desk work recnetly and that has been been aggravating him.       6. ENZO- using CPAP every night and is doing well. Feels well rested.  Sees his sleep doctor next visit.       7. htn -- bp is  controled to day-118/60  Last visit we started him on amlodipine 5mg a day.  He has all his supplies.       8.  Recent Cholecystectomy.  Doing well.  NO loose stools in the last week.          He has had several skin cancers removed.  Follows up regularly with his Dermatologist in [place].                      ROS : Gen - no fatigue -- he  down 9 #       Eyes - no eye pain  - he has had some blurriness. -     ENT - no hoarseness or sore throat  CV - No chest pain or SOB. NO palpitations.  Can walk up 2 flights of stairs at work without stopping to rest or having chest pain .    Pulm - no cough or wheezing  GI - no N/V/D   no dysuria or incontinence  MS - no joint pain or muscle pain  Skin - no rash, or c/o of skin lesions  Neuro -  dizziness--- memory is doing well. -  Heme - no abnormal bleeding or bruising  Endo - no polydipsia, or temperature changes  Psych - ok  Foot- left foot second toe- clawed.        PHYSICAL EXAMINATION:      /64 (BP Location: Right arm, Patient Position: Sitting)   Pulse 75   Ht 6' (1.829 m)   Wt 90.5 kg (199 lb 8.3 oz)   SpO2 97%   BMI 27.06 kg/m²               General appearance: alert, appears stated age and cooperative- not moving around room very well due to back.    Head: Normocephalic, without obvious abnormality, atraumatic-  He has a t shaped wound o his scalp-  Left front-- stood up under his tool box.    Ears: normal TM's and external ear canals both ears  Nose: Nares normal. Septum midline. Mucosa normal. No drainage or sinus tenderness.  Throat: lips, mucosa, and tongue normal; teeth and gums normal  Neck: no adenopathy, no carotid bruit, no JVD, supple, symmetrical, trachea midline and thyroid not enlarged, symmetric, no tenderness/mass/nodules  Back: symmetric, no  curvature. ROM normal. No CVA tenderness.  Lungs: clear to auscultation bilaterally  Chest wall: no tenderness  Heart: regular rate and rhythm, S1, S2 normal, no murmur, click, rub or gallop  Abdomen: soft, non-tender; bowel sounds normal; -- He has small laparoscopic scars- healing well.   Extremities: extremities normal, atraumatic, no cyanosis or edema    Pulses: 2+ and symmetric  Skin: Skin color, texture, turgor normal. No rashes or lesions                           ASSESSMENT:  1. Diabetes mellitus type 2:  -   trulicity 3 mg /week.  ( Off due to upcoming surgery)     Basaglar    .  Amaryl 4mg bid, metformin 1000mg Bid. We discussed appropriate diet and exercise. Discussed hypoglycemia.  He walks daily.  .      .  Work on diet and discussed exercise.     labs today -- follow up in 6  months       2. Anxiety-- He is cutting  down to using 1/2 xanax every morning-  .    stay on cymbalta. .drug  reviewed.  3. He has obstructive sleep apnea for which he uses CPAP. He was using it every night- he wake up well refreshed.    4. ED-- he has tried Viagra- cialias.    5. Back pain- seeing Chiropractor and either ortho or Neuroseugery  for this.- off NSAID due to h/o DEE.       6. Peripheral neuropathy.  -Had to come off   elavil  == He is on this due to his oa of the knees and shoulder pains.  He had ARF in 2013 on NSAIDs.  We took him off the Mobic because of acute renal failure and his chronic kidney disease. He is on gabapentin 300 mg a day- maybe at night-- will increase to 300 mg twice daily.   7. HTN- better on  amlodipine to 10  mg a day -    8.  episodic N/V-- this has resolved/ or been a lot better  since Feb 2022  9. Coccydynia-  discuss sitting cushion- last visit - may need lumbar surgery    10 PVD-- control bp, chol and diabetes.    11. Afib was in 2013-- no new episodes.    12.  Emphysematous changes are suggested.  Atelectasis and/or scar suggested at the lung bases.  4 mm solid nodule suggested in the  right lower lobe of the lung (series 2, image 9. this appears essentially unchanged since 06/04/2014 abdomen pelvis CT and as such is favored to be benign.-- on CT of abdomen from 2/12/21--  Has stopped smoking- On July 8th 2024.    13. Colon polysp-- due back in 2024- will wait til after surgery      14.ckd 3A-  discussed.                14.  Spinal stenoisis-- cleared for anesthesia  and surgery  see last note      15. Tobacco abuse---stopped smoking- July 2024.        LDCT  due in May 2025.                 Instructions    Hold meloxican 1 week before surgery  Hold all NSAID's ( advil, alieve, etc ) 1 week before surgery   Holds dulaglutide  2 weeks before surgery

## 2024-11-22 RX ORDER — METFORMIN HYDROCHLORIDE 500 MG/1
TABLET ORAL
Qty: 360 TABLET | Refills: 1 | Status: SHIPPED | OUTPATIENT
Start: 2024-11-22

## 2024-11-22 NOTE — TELEPHONE ENCOUNTER
Refill Routing Note   Medication(s) are not appropriate for processing by [facility] for the following reason(s):        Drug-disease interaction    [facility] action(s):  Defer      Medication Therapy Plan: Drug-Disease: metFORMIN and Gastroenteritis    Pharmacist review requested: Yes     Appointments  past 12m or future 3m with PCP    Date Provider   Last Visit   11/19/2024 Eddie Delaney Jr., MD   Next Visit   5/20/2025 Eddie Delaney Jr., MD   ED visits in past 90 days: 0        Note composed:10:08 AM 11/22/2024

## 2024-11-22 NOTE — TELEPHONE ENCOUNTER
No care due was identified.  [facility] Embedded Care Due Messages. Reference number: [id number].   11/22/2024 8:02:00 AM CST

## 2024-11-22 NOTE — TELEPHONE ENCOUNTER
Refill Decision Note   Ellis Hatch  is requesting a refill authorization.  Brief Assessment and Rationale for Refill:  Approve     Medication Therapy Plan:        Pharmacist review requested: Yes   Extended chart review required: Yes   Comments:     Note composed:10:22 AM 11/22/2024

## 2025-01-20 DIAGNOSIS — F41.9 ANXIETY: Primary | ICD-10-CM

## 2025-01-20 NOTE — TELEPHONE ENCOUNTER
No care due was identified.  Health [facility] Embedded Care Due Messages. Reference number: [id number].   1/20/2025 11:35:57 AM CST

## 2025-01-22 RX ORDER — ALPRAZOLAM 0.5 MG/1
0.5 TABLET ORAL DAILY PRN
Qty: 10 TABLET | Refills: 2 | Status: SHIPPED | OUTPATIENT
Start: 2025-01-22

## 2025-01-31 ENCOUNTER — TELEPHONE (OUTPATIENT)
Dept: INTERNAL MEDICINE | Facility: CLINIC | Age: 75
End: 2025-01-31
Payer: MEDICARE

## 2025-01-31 NOTE — TELEPHONE ENCOUNTER
----- Message from Evelin sent at 1/31/2025 10:56 AM CST -----  .1MEDICALADVICE     Patient is calling for Medical Advice regarding: pt wife would like a call back vomiting and diarrhea with chills.     How long has patient had these symptoms: started at 7:30 am     Pharmacy name and phone#:    Patient wants a call back or thru [facility]:    Comments:    Please advise patient replies from provider may take up to 48 hours.

## 2025-01-31 NOTE — TELEPHONE ENCOUNTER
Spoke with patient's wife who stated patient Has been vomiting and having diarrhea since 7:30 am. Patient refused to go to ED. Patient is currently asleep. Expressed to his wife that he feel better, Nurse expressed to wife that when her  get up from his nap, if he continues to vomit and have diarrhea he needs to go to ED for evaluation Because patient  may become dehydrated . Wife verbalized understanding.

## 2025-02-11 DIAGNOSIS — F41.9 ANXIETY: ICD-10-CM

## 2025-02-11 NOTE — TELEPHONE ENCOUNTER
Care Due:                  Date            Visit Type   Department     Provider  --------------------------------------------------------------------------------                                EP                               PRIMARY      [facility] INTERNAL  Last Visit: 11-      CARE ([facility])   LUCIA Delaney                              [facility]                              PRIMARY      [facility] INTERNAL  Next Visit: 05-      CARE ([facility])   [facility]       Eddie Delaney                                                            Last  Test          Frequency    Reason                     Performed    Due Date  --------------------------------------------------------------------------------    HBA1C.......  6 months...  dulaglutide, glimepiride,   11- 05-                             insulin, metFORMIN.......    Uric Acid...  12 months..  allopurinoL..............  Not Found    Overdue    [employer] Embedded Care Due Messages. Reference number: [id number].   2/11/2025 8:46:32 AM CST

## 2025-02-14 RX ORDER — ALPRAZOLAM 0.5 MG/1
0.5 TABLET ORAL DAILY PRN
Qty: 8 TABLET | Refills: 2 | Status: SHIPPED | OUTPATIENT
Start: 2025-02-14

## 2025-02-16 NOTE — TELEPHONE ENCOUNTER
No care due was identified.  [facility] Embedded Care Due Messages. Reference number: [id number].   2/16/2025 8:01:08 AM CST

## 2025-02-17 RX ORDER — MELOXICAM 15 MG/1
TABLET ORAL
Qty: 30 TABLET | Refills: 2 | Status: SHIPPED | OUTPATIENT
Start: 2025-02-17

## 2025-02-22 DIAGNOSIS — Z00.00 ENCOUNTER FOR MEDICARE ANNUAL WELLNESS EXAM: ICD-10-CM

## 2025-02-25 RX ORDER — TAMSULOSIN HYDROCHLORIDE 0.4 MG/1
1 CAPSULE ORAL
Qty: 90 CAPSULE | Refills: 2 | Status: SHIPPED | OUTPATIENT
Start: 2025-02-25

## 2025-02-25 NOTE — TELEPHONE ENCOUNTER
No care due was identified.  Health [facility] Embedded Care Due Messages. Reference number: [id number].   2/25/2025 8:02:30 AM CST

## 2025-02-25 NOTE — TELEPHONE ENCOUNTER
Refill Decision Note   Ellis Hatch  is requesting a refill authorization.  Brief Assessment and Rationale for Refill:  Approve     Medication Therapy Plan:         Comments:     Note composed:3:37 PM 02/25/2025

## 2025-02-27 NOTE — TELEPHONE ENCOUNTER
----- Message from Sahara sent at 2/27/2025  2:17 PM CST -----  Can the clinic reply in [facility]:ROLANDO MOSER [[id number]]  Please refill the medication(s) listed below. Please call the patient when the prescription(s) is ready for  at this phone number   Telephone Information:Mobile          [phone]  Medication #1gabapentin (NEURONTIN) 300 MG capsule   Preferred Pharmacy: [facility] [place] [id number] [place] [id number] [address] [address]Xkbur: [phone] Fax: [phone]

## 2025-02-27 NOTE — TELEPHONE ENCOUNTER
No care due was identified.  [facility] Embedded Care Due Messages. Reference number: [id number].   2/27/2025 2:23:20 PM CST

## 2025-03-02 RX ORDER — GABAPENTIN 300 MG/1
300 CAPSULE ORAL 2 TIMES DAILY
Qty: 180 CAPSULE | Refills: 3 | Status: SHIPPED | OUTPATIENT
Start: 2025-03-02

## 2025-03-26 DIAGNOSIS — E11.9 TYPE 2 DIABETES MELLITUS WITHOUT COMPLICATION, WITHOUT LONG-TERM CURRENT USE OF INSULIN: ICD-10-CM

## 2025-03-26 RX ORDER — DULAGLUTIDE 3 MG/.5ML
INJECTION, SOLUTION SUBCUTANEOUS
Qty: 12 PEN | Refills: 0 | Status: SHIPPED | OUTPATIENT
Start: 2025-03-26

## 2025-03-26 NOTE — TELEPHONE ENCOUNTER
Refill Decision Note   Ellis Hatch  is requesting a refill authorization.  Brief Assessment and Rationale for Refill:  Approve     Medication Therapy Plan:        Comments:     Note composed:4:31 PM 03/26/2025

## 2025-04-01 RX ORDER — ATORVASTATIN CALCIUM 40 MG/1
40 TABLET, FILM COATED ORAL
Qty: 90 TABLET | Refills: 2 | Status: SHIPPED | OUTPATIENT
Start: 2025-04-01

## 2025-04-01 NOTE — TELEPHONE ENCOUNTER
No care due was identified.  Health [facility] Embedded Care Due Messages. Reference number: [id number].   4/01/2025 8:05:17 AM CDT

## 2025-04-01 NOTE — TELEPHONE ENCOUNTER
Refill Decision Note   Ellis Hatch  is requesting a refill authorization.  Brief Assessment and Rationale for Refill:  Approve     Medication Therapy Plan:        Comments:     Note composed:2:31 PM 04/01/2025

## 2025-05-01 RX ORDER — PANTOPRAZOLE SODIUM 40 MG/1
40 TABLET, DELAYED RELEASE ORAL
Qty: 90 TABLET | Refills: 1 | Status: SHIPPED | OUTPATIENT
Start: 2025-05-01

## 2025-05-01 NOTE — TELEPHONE ENCOUNTER
No care due was identified.  [facility] Embedded Care Due Messages. Reference number: [id number].   5/01/2025 9:30:09 AM CDT

## 2025-05-01 NOTE — TELEPHONE ENCOUNTER
Refill Decision Note   Ellis Hatch  is requesting a refill authorization.  Brief Assessment and Rationale for Refill:  Approve     Medication Therapy Plan:         Comments:     Note composed:9:57 AM 05/01/2025

## 2025-05-13 ENCOUNTER — LAB VISIT (OUTPATIENT)
Dept: LAB | Facility: HOSPITAL | Age: 75
End: 2025-05-13
Attending: INTERNAL MEDICINE
Payer: MEDICARE

## 2025-05-13 DIAGNOSIS — E11.49 TYPE II DIABETES MELLITUS WITH NEUROLOGICAL MANIFESTATIONS: ICD-10-CM

## 2025-05-13 LAB
ALBUMIN SERPL BCP-MCNC: 3.9 G/DL (ref 3.5–5.2)
ALP SERPL-CCNC: 106 UNIT/L (ref 40–150)
ALT SERPL W/O P-5'-P-CCNC: 24 UNIT/L (ref 10–44)
ANION GAP (OHS): 8 MMOL/L (ref 8–16)
AST SERPL-CCNC: 25 UNIT/L (ref 11–45)
BILIRUB SERPL-MCNC: 0.7 MG/DL (ref 0.1–1)
BUN SERPL-MCNC: 25 MG/DL (ref 8–23)
CALCIUM SERPL-MCNC: 9.5 MG/DL (ref 8.7–10.5)
CHLORIDE SERPL-SCNC: 106 MMOL/L (ref 95–110)
CO2 SERPL-SCNC: 24 MMOL/L (ref 23–29)
CREAT SERPL-MCNC: 1.2 MG/DL (ref 0.5–1.4)
EAG (OHS): 137 MG/DL (ref 68–131)
GFR SERPLBLD CREATININE-BSD FMLA CKD-EPI: >60 ML/MIN/1.73/M2
GLUCOSE SERPL-MCNC: 131 MG/DL (ref 70–110)
HBA1C MFR BLD: 6.4 % (ref 4–5.6)
POTASSIUM SERPL-SCNC: 4.6 MMOL/L (ref 3.5–5.1)
PROT SERPL-MCNC: 7.3 GM/DL (ref 6–8.4)
SODIUM SERPL-SCNC: 138 MMOL/L (ref 136–145)

## 2025-05-13 PROCEDURE — 80053 COMPREHEN METABOLIC PANEL: CPT

## 2025-05-13 PROCEDURE — 83036 HEMOGLOBIN GLYCOSYLATED A1C: CPT

## 2025-05-13 PROCEDURE — 36415 COLL VENOUS BLD VENIPUNCTURE: CPT

## 2025-05-18 NOTE — TELEPHONE ENCOUNTER
No care due was identified.  [facility] Embedded Care Due Messages. Reference number: [id number].   5/18/2025 8:04:57 AM CDT

## 2025-05-19 RX ORDER — MELOXICAM 15 MG/1
TABLET ORAL
Qty: 30 TABLET | Refills: 2 | Status: SHIPPED | OUTPATIENT
Start: 2025-05-19

## 2025-05-20 ENCOUNTER — OFFICE VISIT (OUTPATIENT)
Dept: INTERNAL MEDICINE | Facility: CLINIC | Age: 75
End: 2025-05-20
Payer: MEDICARE

## 2025-05-20 VITALS
OXYGEN SATURATION: 97 % | HEIGHT: 72 IN | SYSTOLIC BLOOD PRESSURE: 126 MMHG | HEART RATE: 80 BPM | BODY MASS INDEX: 26.69 KG/M2 | WEIGHT: 197.06 LBS | DIASTOLIC BLOOD PRESSURE: 66 MMHG

## 2025-05-20 DIAGNOSIS — E11.49 TYPE II DIABETES MELLITUS WITH NEUROLOGICAL MANIFESTATIONS: ICD-10-CM

## 2025-05-20 DIAGNOSIS — G62.9 PERIPHERAL POLYNEUROPATHY: ICD-10-CM

## 2025-05-20 DIAGNOSIS — F17.210 NICOTINE DEPENDENCE, CIGARETTES, UNCOMPLICATED: ICD-10-CM

## 2025-05-20 DIAGNOSIS — Z72.0 TOBACCO ABUSE: Primary | ICD-10-CM

## 2025-05-20 DIAGNOSIS — G47.33 OSA (OBSTRUCTIVE SLEEP APNEA): ICD-10-CM

## 2025-05-20 DIAGNOSIS — E11.9 TYPE 2 DIABETES MELLITUS WITHOUT COMPLICATION, WITHOUT LONG-TERM CURRENT USE OF INSULIN: ICD-10-CM

## 2025-05-20 DIAGNOSIS — F41.9 ANXIETY: ICD-10-CM

## 2025-05-20 DIAGNOSIS — K63.5 POLYP OF COLON, UNSPECIFIED PART OF COLON, UNSPECIFIED TYPE: ICD-10-CM

## 2025-05-20 DIAGNOSIS — I48.0 PAROXYSMAL ATRIAL FIBRILLATION: ICD-10-CM

## 2025-05-20 DIAGNOSIS — I73.9 PERIPHERAL VASCULAR DISEASE, UNSPECIFIED: ICD-10-CM

## 2025-05-20 DIAGNOSIS — K80.00 CALCULUS OF GALLBLADDER WITH ACUTE CHOLECYSTITIS WITHOUT OBSTRUCTION: ICD-10-CM

## 2025-05-20 DIAGNOSIS — E78.5 HYPERLIPIDEMIA, UNSPECIFIED HYPERLIPIDEMIA TYPE: ICD-10-CM

## 2025-05-20 DIAGNOSIS — J43.9 PULMONARY EMPHYSEMA, UNSPECIFIED EMPHYSEMA TYPE: ICD-10-CM

## 2025-05-20 PROBLEM — D50.9 FE DEFICIENCY ANEMIA: Status: RESOLVED | Noted: 2021-03-05 | Resolved: 2025-05-20

## 2025-05-20 PROBLEM — N18.30 CKD (CHRONIC KIDNEY DISEASE) STAGE 3, GFR 30-59 ML/MIN: Status: RESOLVED | Noted: 2018-06-22 | Resolved: 2025-05-20

## 2025-05-20 PROBLEM — L97.521 ULCER OF LEFT FOOT, LIMITED TO BREAKDOWN OF SKIN: Status: RESOLVED | Noted: 2023-07-30 | Resolved: 2025-05-20

## 2025-05-20 PROCEDURE — 99215 OFFICE O/P EST HI 40 MIN: CPT | Mod: S$PBB,,, | Performed by: INTERNAL MEDICINE

## 2025-05-20 PROCEDURE — G2211 COMPLEX E/M VISIT ADD ON: HCPCS | Mod: S$PBB,,, | Performed by: INTERNAL MEDICINE

## 2025-05-20 PROCEDURE — 99999 PR PBB SHADOW E&M-EST. PATIENT-LVL III: CPT | Mod: PBBFAC,,, | Performed by: INTERNAL MEDICINE

## 2025-05-20 PROCEDURE — 99213 OFFICE O/P EST LOW 20 MIN: CPT | Mod: PBBFAC | Performed by: INTERNAL MEDICINE

## 2025-05-20 RX ORDER — DULOXETIN HYDROCHLORIDE 60 MG/1
60 CAPSULE, DELAYED RELEASE ORAL DAILY
Qty: 90 CAPSULE | Refills: 3 | Status: SHIPPED | OUTPATIENT
Start: 2025-05-20

## 2025-05-20 RX ORDER — METFORMIN HYDROCHLORIDE 500 MG/1
1000 TABLET ORAL 2 TIMES DAILY
Qty: 360 TABLET | Refills: 1 | Status: SHIPPED | OUTPATIENT
Start: 2025-05-20

## 2025-05-20 RX ORDER — ALLOPURINOL 100 MG/1
TABLET ORAL
Qty: 90 TABLET | Refills: 2 | Status: SHIPPED | OUTPATIENT
Start: 2025-05-20

## 2025-05-20 RX ORDER — ATORVASTATIN CALCIUM 40 MG/1
40 TABLET, FILM COATED ORAL DAILY
Qty: 90 TABLET | Refills: 2 | Status: SHIPPED | OUTPATIENT
Start: 2025-05-20

## 2025-05-20 RX ORDER — INSULIN GLARGINE 100 [IU]/ML
25 INJECTION, SOLUTION SUBCUTANEOUS DAILY
Qty: 24 ML | Refills: 3 | Status: SHIPPED | OUTPATIENT
Start: 2025-05-20

## 2025-05-20 RX ORDER — AMLODIPINE BESYLATE 10 MG/1
10 TABLET ORAL DAILY
Qty: 90 TABLET | Refills: 3 | Status: SHIPPED | OUTPATIENT
Start: 2025-05-20

## 2025-05-20 RX ORDER — DULAGLUTIDE 3 MG/.5ML
3 INJECTION, SOLUTION SUBCUTANEOUS
Qty: 12 PEN | Refills: 5 | Status: SHIPPED | OUTPATIENT
Start: 2025-05-20

## 2025-05-20 RX ORDER — GLIMEPIRIDE 4 MG/1
TABLET ORAL
Qty: 180 TABLET | Refills: 3 | Status: SHIPPED | OUTPATIENT
Start: 2025-05-20

## 2025-05-20 RX ORDER — TAMSULOSIN HYDROCHLORIDE 0.4 MG/1
1 CAPSULE ORAL DAILY
Qty: 90 CAPSULE | Refills: 2 | Status: SHIPPED | OUTPATIENT
Start: 2025-05-20

## 2025-05-20 NOTE — PROGRESS NOTES
"  This is a 75 -year-old gentleman follow up for his medical problem.  Blood sugar has been running "pretty good" .  -  He has a laparoscopic cholecystectomy Oct 4th- less than 2 weeks ago.  He had this for his diarrhea.    He  s/p  Decompression with Coflex L3/L4 and L4/L5.  Surgery was in October          Needs C-scope-       . PMHx of HTN, acute kidney injury, kidney stones, and DM. PSHx of hernia repair and colonoscopy. Social hx of using skoal all day for the past 40 years.  He denies any polyuria.  NO recent steroids.  Got new machine and glucose have been doing better.               Diabetes mellitus type 2. He has had DM for 20 years.  He is on   Amaryl 4 mg in the morning, metformin, Lantus -25 units.     and trulicity-   -   . His hemoglobin A1c 6.9  (10/2024) .  Recent GLu 129  -.    He has been moving around more. He is able to work, but not exercising very much. No low Glucoses at home. He is having some nocturnal burning in feet.            2. He has chronic kidney disease, status post acute kidney injury back in  December 2013. Creatinine had gone up to 2.0 to 2.3 twice I the past  (2014 & 2018) . He had renal stones and    had lithotripsy last year. Most recent creatinine is 1.2  , putting him at  stage II chronic kidney disease.   He has renal stones and sees a urologist in [place] for renal stones. .           3. He has hyperlipidemia, which he has had for several years. He is on  Lipitor. His cholesterol was 94,, HDL 35 , LDL is 35.2 , and triglycerides    are 119 - these are from  11/24 .        4. He has OA of both knees, s/p right  knee replacement 2013. And left knee replacement in 2018.   had Decompression with Coflex L3/L4 and L4/L5.   in October 2024-- pain in buttocks is gone.      5. Torn rotator cuffs- He is going to see MD in [facility] for his shoulder.  .  Did not have surgery.  He is getting dry needling.  this is fine   He has been doing [employer] work recnetly and that has been " been aggravating him.       6. ENZO- using CPAP every night and is doing well. Feels well rested.  Sees his sleep doctor next visit.       7. htn -- bp is  controled to day-126/66  Last visit we started him on amlodipine 5mg a day.   NO chest pain      8.  Recent Cholecystectomy.  Doing well.  Still having episodic nausea and diarrhea-- seeing GI and about to get a egd and c-scope.          He has had several skin cancers removed.  Follows up regularly with his dermatologist in [place].                      ROS : Gen - no fatigue -- he  down 9 #       Eyes - no eye pain  - he has had some blurriness. -     ENT - no hoarseness or sore throat  CV - No chest pain or SOB. NO palpitations.  Can walk up 2 flights of stairs at work without stopping to rest or having chest pain .    Pulm - no cough or wheezing  GI - no N/V/D   no dysuria or incontinence  MS - no joint pain or muscle pain  Skin - no rash, or c/o of skin lesions  Neuro -  dizziness--- memory is doing well. -  Heme - no abnormal bleeding or bruising  Endo - no polydipsia, or temperature changes  Psych - ok  Foot- left foot second toe- clawed.        PHYSICAL EXAMINATION:           /66 (BP Location: Right arm, Patient Position: Sitting)   Pulse 80   Ht 6' (1.829 m)   Wt 89.4 kg (197 lb 1.5 oz)   SpO2 97%   BMI 26.73 kg/m²           General appearance: alert, appears stated age and cooperative- not moving around room very well due to back.    Head: Normocephalic, without obvious abnormality, atraumatic-  He has a t shaped wound o his scalp-  Left front-- stood up under his tool box.    Ears: normal TM's and external ear canals both ears  Nose: Nares normal. Septum midline. Mucosa normal. No drainage or sinus tenderness.  Throat: lips, mucosa, and tongue normal; teeth and gums normal  Neck: no adenopathy, no carotid bruit, no JVD, supple, symmetrical, trachea midline and thyroid not enlarged, symmetric, no tenderness/mass/nodules  Back: symmetric, no  curvature. ROM normal. No CVA tenderness.  Lungs: clear to auscultation bilaterally  Chest wall: no tenderness  Heart: regular rate and rhythm, S1, S2 normal, no murmur, click, rub or gallop  Abdomen: soft, non-tender; bowel sounds normal; -- He has small laparoscopic scars- healing well.   Extremities: extremities normal, atraumatic, no cyanosis or edema    Pulses: 2+ and symmetric  Skin: Skin color, texture, turgor normal. No rashes or lesions                     ASSESSMENT:  1. Diabetes mellitus type 2:  -   trulicity 3 mg /week.     Basaglar 25 mg bid,   .  Amaryl 4mg bid, metformin 1000mg Bid. We discussed appropriate diet and exercise. Discussed hypoglycemia.  He walks daily.  .      .  Work on diet and discussed exercise.     labs today -- follow up in 6  months       2. Anxiety-- He is cutting  down to using 1/2 xanax every morning-  .    stay on cymbalta. .drug  reviewed.  3. He has obstructive sleep apnea for which he uses CPAP. He was using it every night- he wake up well refreshed.  has all his supplies    4. ED-- he has tried Viagra- cialias.    5. Back pain- seeing Chiropractor and either ortho or Neuroseugery  for this.- off NSAID due to h/o DEE.       6. Peripheral neuropathy.  -Had to come off   elavil.     He is on this due to his oa of the knees and shoulder pains.  He had ARF in 2013 on NSAIDs.  We took him off the Mobic because of acute renal failure and his chronic kidney disease. He is on gabapentin 300 mg a day- maybe at night-- will increase to 300 mg twice daily.   7. HTN- better on  amlodipine to 10  mg a day -    8.  episodic N/V-- this has resolved/ or been a lot better  since Feb 2022  9.   PVD-- control bp, chol and diabetes.   10 Afib was in 2013-- no new episodes.     11. Emphysematous changes are suggested.  Atelectasis and/or scar suggested at the lung bases.  4 mm solid nodule suggested in the right lower lobe of the lung (series 2, image 9. this appears essentially unchanged  since 06/04/2014 abdomen pelvis CT and as such is favored to be benign.-- on CT of abdomen from 2/12/21--  Has stopped smoking- On July 8th 2024 restarted-- smoking 7-8 cigarettes a day. Discussed cutting down to 4 cig a day.        12  Colon polysp-- due back in 2024- - will get in July         13.  Spinal stenoisis-- s/p surgery    better      14. Tobacco abuse---stopped smoking- July 2024.          Our office providers are the focal point for all needed health care services that are part of ongoing care related to a patient's single serious condition or the patient's complex conditions.      Follow up in 6  months with labs

## 2025-05-29 ENCOUNTER — HOSPITAL ENCOUNTER (OUTPATIENT)
Dept: RADIOLOGY | Facility: HOSPITAL | Age: 75
Discharge: HOME | End: 2025-05-29
Attending: INTERNAL MEDICINE
Payer: MEDICARE

## 2025-05-29 DIAGNOSIS — E11.9 TYPE 2 DIABETES MELLITUS WITHOUT COMPLICATION: ICD-10-CM

## 2025-05-29 DIAGNOSIS — Z72.0 TOBACCO ABUSE: ICD-10-CM

## 2025-05-29 DIAGNOSIS — F17.210 NICOTINE DEPENDENCE, CIGARETTES, UNCOMPLICATED: ICD-10-CM

## 2025-05-29 PROCEDURE — 71271 CT THORAX LUNG CANCER SCR C-: CPT | Mod: TC

## 2025-06-17 DIAGNOSIS — F41.9 ANXIETY: ICD-10-CM

## 2025-06-17 NOTE — TELEPHONE ENCOUNTER
No care due was identified.  [facility] Embedded Care Due Messages. Reference number: [id number].   6/17/2025 1:49:08 PM CDT

## 2025-06-19 ENCOUNTER — TELEPHONE (OUTPATIENT)
Dept: INTERNAL MEDICINE | Facility: CLINIC | Age: 75
End: 2025-06-19
Payer: MEDICARE

## 2025-06-19 DIAGNOSIS — F41.9 ANXIETY: ICD-10-CM

## 2025-06-19 RX ORDER — ALPRAZOLAM 0.5 MG/1
0.5 TABLET ORAL DAILY PRN
Qty: 8 TABLET | Refills: 2 | OUTPATIENT
Start: 2025-06-19

## 2025-06-19 RX ORDER — ALPRAZOLAM 0.5 MG/1
0.5 TABLET ORAL DAILY PRN
Qty: 4 TABLET | Refills: 2 | Status: SHIPPED | OUTPATIENT
Start: 2025-06-19

## 2025-06-19 NOTE — TELEPHONE ENCOUNTER
Copied from CRM #[id number]. Topic: Medications - Medication Refill  >> Jun 19, 2025  1:38 PM Suzanne wrote:  Requesting an RX refill or new RX.    Is this a refill or new RX: Refill     RX name and strength (ALPRAZolam (XANAX) 0.5 MG tablet:      Is this a 30 day or 90 day RX:     Pharmacy name and phone #     [facility] - [facility] [place] [address] [address] [address]  Phone: [phone] Fax: [phone]   :      Who called and call back number: MRs. Hawkins (Wife)Phone [phone]

## 2025-08-26 RX ORDER — MELOXICAM 15 MG/1
TABLET ORAL
Qty: 30 TABLET | Refills: 2 | OUTPATIENT
Start: 2025-08-26

## 2025-09-02 RX ORDER — MELOXICAM 15 MG/1
TABLET ORAL
Qty: 30 TABLET | Refills: 2 | OUTPATIENT
Start: 2025-09-02

## (undated) DEVICE — DRAPE CORETEMP FLD WRM 56X62IN

## (undated) DEVICE — SUT MCRYL PLUS 4-0 PS2 27IN

## (undated) DEVICE — POWDER ARISTA AH 3G

## (undated) DEVICE — APPLIER CLIP ENDO LIGAMAX 5MM

## (undated) DEVICE — KIT ANTIFOG W/SPONG & FLUID

## (undated) DEVICE — TROCAR KII BLLN 12MM 10CM

## (undated) DEVICE — BLADE SURG CARBON STEEL SZ11

## (undated) DEVICE — IRRIGATOR ENDOSCOPY DISP.

## (undated) DEVICE — TROCAR ENDOPATH XCEL 5X75MM

## (undated) DEVICE — NDL HYPO STD REG BVL 18GX1.5IN

## (undated) DEVICE — TUBING HF INSUFFLATION W/ FLTR

## (undated) DEVICE — DRAPE ABDOMINAL TIBURON 14X11

## (undated) DEVICE — TOWEL OR DISP STRL BLUE 4/PK

## (undated) DEVICE — SOL NACL 0.9% IV INJ 1000ML

## (undated) DEVICE — BAG TISS RETRV MONARCH 10MM

## (undated) DEVICE — SCISSOR 5MMX35CM DIRECT DRIVE

## (undated) DEVICE — NDL HYPO REG 25G X 1 1/2

## (undated) DEVICE — DRAPE STERI INSTRUMENT 1018

## (undated) DEVICE — APPLICATOR ARISTA FLEX XL

## (undated) DEVICE — TRAY MINOR GEN SURG OMC

## (undated) DEVICE — SUT 0 VICRYL / UR6 (J603)

## (undated) DEVICE — ELECTRODE REM PLYHSV RETURN 9

## (undated) DEVICE — TROCAR ENDOPATH XCEL 5MM 7.5CM

## (undated) DEVICE — ADHESIVE DERMABOND ADVANCED